# Patient Record
Sex: FEMALE | Race: BLACK OR AFRICAN AMERICAN | Employment: FULL TIME | URBAN - METROPOLITAN AREA
[De-identification: names, ages, dates, MRNs, and addresses within clinical notes are randomized per-mention and may not be internally consistent; named-entity substitution may affect disease eponyms.]

---

## 2017-01-31 ENCOUNTER — OFFICE VISIT (OUTPATIENT)
Dept: MIDWIFE SERVICES | Age: 27
End: 2017-01-31

## 2017-01-31 VITALS
BODY MASS INDEX: 37.2 KG/M2 | HEIGHT: 67 IN | DIASTOLIC BLOOD PRESSURE: 92 MMHG | HEART RATE: 97 BPM | SYSTOLIC BLOOD PRESSURE: 152 MMHG | WEIGHT: 237 LBS

## 2017-01-31 DIAGNOSIS — R87.612 LOW GRADE SQUAMOUS INTRAEPITH LESION ON CYTOLOGIC SMEAR CERVIX (LGSIL): Primary | ICD-10-CM

## 2017-01-31 RX ORDER — CODEINE PHOSPHATE AND GUAIFENESIN 10; 100 MG/5ML; MG/5ML
SOLUTION ORAL
COMMUNITY
Start: 2016-12-14 | End: 2018-01-05

## 2017-01-31 NOTE — PROGRESS NOTES
Chief Complaint   Patient presents with    Procedure     colposcopy     Visit Vitals    BP (!) 152/92    Pulse 97    Ht 5' 7\" (1.702 m)    Wt 237 lb (107.5 kg)    BMI 37.12 kg/m2       1. Have you been to the ER, urgent care clinic since your last visit? Hospitalized since your last visit? No    2. Have you seen or consulted any other health care providers outside of the [address] [place] [address] since your last visit? Include any pap smears or colon screening. No     Here today for a colposcopy . She verbalizes that she is very nervous. [address]  OFFICE PROCEDURE PROGRESS NOTE        Chart reviewed for the following:   Mari WARD RN, have reviewed the History, Physical and updated the Allergic reactions for Betgenevieve Jag.      TIME OUT performed immediately prior to start of procedure:   Mari WARD RN, have performed the following reviews on Betgenevieve Jag prior to the start of the procedure:            * Patient was identified by name and date of birth   * Agreement on procedure being performed was verified  * Risks and Benefits explained to the patient  * Procedure site verified and marked as necessary  * Patient was positioned for comfort  * Consent was signed and verified     Time: 1310      Date of procedure: 01/31/17    Procedure performed by: Carla Meléndez MD    Provider assisted by: Mari Nolasco RN      Patient assisted by: self    How tolerated by patient: tolerated the procedure well with no complications    Comments:

## 2017-02-01 NOTE — PROGRESS NOTES
Colposcopy Procedure Note    2017    Jef   31 y.o. AAF   LMP:  2017      Preop DX:       ICD-10-CM ICD-9-CM    1. Low grade squamous intraepith lesion on cytologic smear cervix (lgsil) R87.612 795.03         Post op Dx:  MUSTAPHA I-II    Procedure:   Colposcopy with biopsy of cervix at 12,2,3 and 6 o'clock and ECC    Indications:   Most recent Pap smear 6 weeks ago result: Mildly abnormal (LGSIL - encompassing HPV,mild dysplasia,MUSTAPHA I). .  The prior Pap result: not available  Prior cervical/vaginal disease: none. Prior cervical treatment: no treatment. Procedure Details   The risks and benefits of the procedure and written informed consent obtained. A timeout was taken to verify the patient and procedure to be performed. Speculum placed in vagina and excellent visualization of cervix achieved. Cervix examined under high power with complete visualization of the transformation zone. The cervix was cleaned with NS and examined under a green filter. Acetic acid was applied and acetowhite lesions were identified at 12, 2, 3 and 6 o'clock. An ECC was performed without with much bleeding       Findings:  Cervix:        Vaginal inspection: normal without visible lesions. Vulvar colposcopy: normal epithelium without lesions. Specimens: biopsies and ECC for permanent    Complications: none. Plan:  Tiffany Lam was seen today for procedure. Diagnoses and all orders for this visit:    Low grade squamous intraepith lesion on cytologic smear cervix (lgsil)      Follow-up Disposition: Not on File            Alejandro Troy MD, [address] [place] [address], Retired    April Cabot Professor, [address]

## 2017-02-04 LAB
DX ICD CODE: NORMAL
DX ICD CODE: NORMAL
PATH REPORT.FINAL DX SPEC: NORMAL
PATH REPORT.GROSS SPEC: NORMAL
PATH REPORT.SITE OF ORIGIN SPEC: NORMAL
PATHOLOGIST NAME: NORMAL
PAYMENT PROCEDURE: NORMAL

## 2017-02-07 ENCOUNTER — TELEPHONE (OUTPATIENT)
Dept: MIDWIFE SERVICES | Age: 27
End: 2017-02-07

## 2017-02-07 NOTE — TELEPHONE ENCOUNTER
Pt came into the office requesting colposcopy results and also stated there is an odor after having the colposcopy. Please call.

## 2017-02-08 NOTE — TELEPHONE ENCOUNTER
Spoke with Júnior Raza and discussed her results and the need to make an appt for 6 months for a pap. She states she already has an appt and had no further questions.

## 2017-07-13 ENCOUNTER — OFFICE VISIT (OUTPATIENT)
Dept: FAMILY MEDICINE CLINIC | Age: 27
End: 2017-07-13

## 2017-07-13 VITALS
DIASTOLIC BLOOD PRESSURE: 85 MMHG | HEIGHT: 67 IN | SYSTOLIC BLOOD PRESSURE: 132 MMHG | TEMPERATURE: 98.6 F | BODY MASS INDEX: 39.02 KG/M2 | HEART RATE: 90 BPM | WEIGHT: 248.6 LBS | RESPIRATION RATE: 16 BRPM | OXYGEN SATURATION: 97 %

## 2017-07-13 DIAGNOSIS — N34.2 INFECTIVE URETHRITIS: Primary | ICD-10-CM

## 2017-07-13 LAB
BILIRUB UR QL STRIP: NEGATIVE
GLUCOSE UR-MCNC: NEGATIVE MG/DL
KETONES P FAST UR STRIP-MCNC: NEGATIVE MG/DL
PH UR STRIP: 7 [PH] (ref 4.6–8)
PROT UR QL STRIP: NORMAL MG/DL
SP GR UR STRIP: 1.02 (ref 1–1.03)
UA UROBILINOGEN AMB POC: NORMAL (ref 0.2–1)
URINALYSIS CLARITY POC: CLEAR
URINALYSIS COLOR POC: YELLOW
URINE BLOOD POC: NORMAL
URINE LEUKOCYTES POC: NORMAL
URINE NITRITES POC: NEGATIVE

## 2017-07-13 RX ORDER — NITROFURANTOIN 25; 75 MG/1; MG/1
100 CAPSULE ORAL 2 TIMES DAILY
Qty: 10 CAP | Refills: 0 | Status: SHIPPED | OUTPATIENT
Start: 2017-07-13 | End: 2018-01-05 | Stop reason: ALTCHOICE

## 2017-07-13 NOTE — PROGRESS NOTES
Subjective  Memory Heading is an 28 y.o. female . Patient presents for dysuria. She also has urgency and frequency. Her symptoms started Monday. She called her PCP's office and was prescribed bactrim. She says she started to have improvement in her symptoms but then today noticed the dysuria returning and noticed a little more blood in her urine. She does not have fevers or chills. No nausea or vomiting. She does not have flank pain. Does not smoke. Last menstrual period ended July 1st.      History of UTI?: yes    Allergies - reviewed: Allergies   Allergen Reactions    Lisinopril Swelling     Lips swell         Medications - reviewed:   Current Outpatient Prescriptions   Medication Sig    hydrochlorothiazide (HYDRODIURIL) 25 mg tablet Take 1 Tab by mouth daily.  simvastatin (ZOCOR) 10 mg tablet Take 1 Tab by mouth nightly.  guaiFENesin-codeine (ROBITUSSIN AC) 100-10 mg/5 mL solution      No current facility-administered medications for this visit. Past Medical History - reviewed:  Past Medical History:   Diagnosis Date    Hypercholesterolemia     Hypertension 1/28/2011    IUD (intrauterine device) in place 7/23/2015    removed 2015    LSIL (low grade squamous intraepithelial lesion) on Pap smear 12/4/12    Menorrhagia with regular cycle 7/2/2013    Obesity, Class II, BMI 35-39.9 ([address] 9/23/13    BMI 37    Secondary dysmenorrhea 7/2/2013         Immunizations - reviewed:   Immunization History   Administered Date(s) Administered    Influenza Vaccine 09/05/2013    Influenza Vaccine Split 11/05/2010, 11/09/2011    TB Skin Test (PPD) Intradermal 06/02/2016    TDAP Vaccine 11/09/2011         ROS  Review of Systems : A complete review of systems as performed and is negative except for those mentioned in the HPI. Physical Exam  Visit Vitals    Ht 5' 7\" (1.702 m)    Wt 248 lb 9.6 oz (112.8 kg)    BMI 38.94 kg/m2       General appearance - Alert, NAD.    Abdomen - sot, non tender, no suprapubic tenderness no CVAT    Assessment/Plan  1. Infective urethritis: classic symptoms and UA showed 3+ LE and 2+ blood. Will switch to macrobid and send urine culture. Possible secondary infection vs resistant strain? . Odd that patient did initially have improvement in symptoms.   - macrobid 100mg BID for 5 days  - Drink lots of fluids    RTC if new or worsening symptoms    I discussed the aforementioned diagnoses with the patient as well as the plan of care.      Chi Cm MD  Family Medicine Resident  PGY 2

## 2017-07-13 NOTE — PATIENT INSTRUCTIONS
Urethritis: Care Instructions  Your Care Instructions    Urethritis is an infection of the tube that takes urine from the bladder to the outside of the body. This tube is called the urethra. The infection is often caused by bacteria. This can happen if you have a sexually transmitted infection (STI). But a virus may also be a cause. Urethritis is usually treated with antibiotics. Most cases clear up with treatment. Proper treatment is very important. If you don't treat it, the infection can lead to lasting damage of the urethra. Other parts of the urinary system can also be damaged. Follow-up care is a key part of your treatment and safety. Be sure to make and go to all appointments, and call your doctor if you are having problems. It's also a good idea to know your test results and keep a list of the medicines you take. How can you care for yourself at home? · If your doctor prescribed antibiotics, take them as directed. Do not stop taking them just because you feel better. You need to take the full course of antibiotics. · Take an over-the-counter pain medicine, such as acetaminophen (Tylenol), ibuprofen (Advil, Motrin), or naproxen (Aleve), if needed. Be safe with medicines. Read and follow all instructions on the label. · Do not take two or more pain medicines at the same time unless the doctor told you to. Many pain medicines have acetaminophen, which is Tylenol. Too much acetaminophen (Tylenol) can be harmful. · Your doctor may have you take phenazopyridine (Pyridium). This is a pain medicine for the urinary tract. It can turn your urine a deep red-orange. This is normal. Call your doctor if you think you are having a problem with your medicine. · Do not have sex until you are done with treatment. If you do have sex, be sure to use a condom. Your sex partner or partners should be tested too if your urethritis was caused by an STI.   · If your infection was caused by an injury or chemicals, avoid those things if you can. When should you call for help? Call your doctor now or seek immediate medical care if:  · You can't urinate. · You have symptoms of a urinary infection. For example:  ¨ You have blood or pus in your urine. ¨ You have pain in your back just below your rib cage. This is called flank pain. ¨ You have a fever, chills, or body aches. ¨ It hurts to urinate. ¨ You have groin or belly pain. · You have a hard time urinating when your bladder is full. · You notice mental changes or feel confused. Watch closely for changes in your health, and be sure to contact your doctor if:  · You do not get better as expected. Where can you learn more? Go to http://isabel-sayra.info/. Enter T204 in the search box to learn more about \"Urethritis: Care Instructions. \"  Current as of: August 12, 2016  Content Version: 11.3  © 0756-7993 [employer]. Care instructions adapted under license by [employer] (which disclaims liability or warranty for this information). If you have questions about a medical condition or this instruction, always ask your healthcare professional. [address] any warranty or liability for your use of this information.

## 2017-07-15 LAB — BACTERIA UR CULT: ABNORMAL

## 2017-07-17 ENCOUNTER — TELEPHONE (OUTPATIENT)
Dept: FAMILY MEDICINE CLINIC | Age: 27
End: 2017-07-17

## 2017-07-17 NOTE — PROGRESS NOTES
Called patient regarding urine results. (no answer)  Shows she is growing 100k of E coli susceptible to macrobid, which patient was started on at time of visit.   Continue current therapy

## 2018-01-05 ENCOUNTER — OFFICE VISIT (OUTPATIENT)
Dept: MIDWIFE SERVICES | Age: 28
End: 2018-01-05

## 2018-01-05 ENCOUNTER — HOSPITAL ENCOUNTER (OUTPATIENT)
Dept: LAB | Age: 28
Discharge: HOME | End: 2018-01-05
Payer: COMMERCIAL

## 2018-01-05 VITALS
HEART RATE: 81 BPM | WEIGHT: 242 LBS | DIASTOLIC BLOOD PRESSURE: 100 MMHG | HEIGHT: 67 IN | SYSTOLIC BLOOD PRESSURE: 146 MMHG | BODY MASS INDEX: 37.98 KG/M2

## 2018-01-05 DIAGNOSIS — Z11.3 SCREEN FOR STD (SEXUALLY TRANSMITTED DISEASE): ICD-10-CM

## 2018-01-05 DIAGNOSIS — I10 ESSENTIAL HYPERTENSION: ICD-10-CM

## 2018-01-05 DIAGNOSIS — Z01.419 ENCOUNTER FOR WELL WOMAN EXAM WITH ROUTINE GYNECOLOGICAL EXAM: Primary | ICD-10-CM

## 2018-01-05 DIAGNOSIS — R87.612 LOW GRADE SQUAMOUS INTRAEPITH LESION ON CYTOLOGIC SMEAR CERVIX (LGSIL): ICD-10-CM

## 2018-01-05 DIAGNOSIS — E78.3 HYPERCHYLOMICRONEMIA: ICD-10-CM

## 2018-01-05 PROCEDURE — 87624 HPV HI-RISK TYP POOLED RSLT: CPT | Performed by: OBSTETRICS & GYNECOLOGY

## 2018-01-05 PROCEDURE — 88175 CYTOPATH C/V AUTO FLUID REDO: CPT | Performed by: OBSTETRICS & GYNECOLOGY

## 2018-01-05 RX ORDER — HYDROCHLOROTHIAZIDE 25 MG/1
25 TABLET ORAL DAILY
Qty: 30 TAB | Refills: 2 | Status: SHIPPED | OUTPATIENT
Start: 2018-01-05 | End: 2019-07-16 | Stop reason: ALTCHOICE

## 2018-01-05 RX ORDER — IBUPROFEN 200 MG
CAPSULE ORAL
COMMUNITY
End: 2018-01-05 | Stop reason: ALTCHOICE

## 2018-01-05 RX ORDER — SIMVASTATIN 10 MG/1
10 TABLET, FILM COATED ORAL
Qty: 60 TAB | Refills: 0 | Status: SHIPPED | OUTPATIENT
Start: 2018-01-05 | End: 2021-05-24

## 2018-01-05 NOTE — PROGRESS NOTES
Well Woman Check Up       Subjective:     28 y.o.  AmericanF   LMP: 2017 for routine annual exam    Social History: single partner, contraception - condoms. Pertinent past medical history: . PAP History:   Done today due to history of LGSIL  Preventive Measures. Colonoscopy:  Age 48 or earlier based on signigicant FH  Mammogram:  Age 48 or earlier based on FH  DEXA: Age 72 or sooner based on risk factors  Lipids:  Age 36 or based on FH/risk factors      Patient Active Problem List    Diagnosis Date Noted    Obesity, Class II, BMI 35-39.9, with comorbidity 2013    Secondary dysmenorrhea 2013    Menorrhagia with regular cycle 2013    Low grade squamous intraepith lesion on cytologic smear cervix (lgsil) 2012    Hypertension 2011    Vitamin D deficiency 2010    Hyperlipidemia 2010     Current Outpatient Prescriptions   Medication Sig Dispense Refill    simvastatin (ZOCOR) 10 mg tablet Take 1 Tab by mouth nightly. 60 Tab 0    hydroCHLOROthiazide (HYDRODIURIL) 25 mg tablet Take 1 Tab by mouth daily.  30 Tab 2     Allergies   Allergen Reactions    Lisinopril Swelling     Lips swell     Past Medical History:   Diagnosis Date    Hypercholesterolemia     Hypertension 2011    IUD (intrauterine device) in place 2015    removed     LSIL (low grade squamous intraepithelial lesion) on Pap smear 12    Menorrhagia with regular cycle 2013    Obesity, Class II, BMI 35-39.9 13    BMI 37    Secondary dysmenorrhea 2013     Past Surgical History:   Procedure Laterality Date    HX COLPOSCOPY      HX DILATION AND CURETTAGE      pregnancy related    HX OTHER SURGICAL  2013    exploratory-uterine    HX WISDOM TEETH EXTRACTION       Family History   Problem Relation Age of Onset    Hypertension Mother     Hypertension Father     Cancer Father      prostate    Hypertension Sister     Hypertension Maternal Aunt     Diabetes Maternal Grandmother     Hypertension Maternal Grandmother     Diabetes Maternal Grandfather     Hypertension Maternal Grandfather     Diabetes Paternal Grandmother     Hypertension Paternal Grandmother     Diabetes Paternal Grandfather     Hypertension Paternal Grandfather      Social History   Substance Use Topics    Smoking status: Never Smoker    Smokeless tobacco: Never Used    Alcohol use 0.6 oz/week     1 Shots of liquor per week      Comment: on special occasions        ROS:  Feeling well. No dyspnea or chest pain on exertion. No abdominal pain, change in bowel habits, black or bloody stools. No urinary tract symptoms. GYN ROS: normal menses, no abnormal bleeding, pelvic pain or discharge, no breast pain or new or enlarging lumps on self exam, she desires screening for STD's. No neurological complaints. Objective:     Visit Vitals    BP (!) 146/100    Pulse 81    Ht 5' 7\" (1.702 m)    Wt 242 lb (109.8 kg)    LMP 12/14/2017 (Exact Date)    BMI 37.9 kg/m2     Gen: The patient appears well, alert, oriented x 3, in no distress. ENT:  normal.   Neck: supple. No adenopathy or thyromegaly. JORDANA. Lungs:  clear, good air entry, no wheezes, rhonchi or rales. CV: S1 and S2 normal, no murmurs, regular rate and rhythm. Abdomen: soft without tenderness, guarding, mass or organomegaly. Extremities:  no edema, normal peripheral pulses. Neurological:normal, no focal findings. BREAST EXAM: Declined  .     PELVIC EXAM: VULVA: normal appearing vulva with no masses, tenderness or lesions, VAGINA: normal appearing vagina with normal color and discharge, no lesions, CERVIX: normal appearing cervix without discharge or lesions, UTERUS: uterus is normal size, shape, consistency and nontender, ADNEXA: normal adnexa in size, nontender and no masses, PAP: Pap smear done today, thin-prep method, DNA probe for chlamydia and GC obtained exam chaperoned by:  Alton Dougherty LPN Assessment/Plan:     Diagnoses and all orders for this visit:    1. Encounter for well woman exam with routine gynecological exam  -     HEMOGLOBIN A1C WITH EAG  -     LIPID PANEL AND CHOL/HDL RATIO    2. Essential hypertension  -     LIPID PANEL AND CHOL/HDL RATIO  -     hydroCHLOROthiazide (HYDRODIURIL) 25 mg tablet; Take 1 Tab by mouth daily. 3. Low grade squamous intraepith lesion on cytologic smear cervix (lgsil)  -     PAP (IMAGE GUIDED) + HPV HIGH RISK    4. Screen for STD (sexually transmitted disease)  -     HEP B SURFACE AG  -     T PALLIDUM SCREEN W/REFLEX  -     HIV 1/2 AG/AB, 4TH GENERATION,W RFLX CONFIRM  -     CT+NG+TV+HSV BY LYNN    5. Hyperchylomicronemia  -     simvastatin (ZOCOR) 10 mg tablet; Take 1 Tab by mouth nightly. Follow-up Disposition:  Return in about 1 year (around 1/5/2019) for Annual exam pending lab results. Larance Schlatter Thayne Client, MD, [address] [place] [address], Retired    Brandon Forde, [address]

## 2018-01-05 NOTE — PATIENT INSTRUCTIONS
Thank you for choosing [facility]. We know you have many options when it comes to your healthcare; we appreciate that you picked us. Our goal is to provide exceptional service and world class care for every patient. You may receive a survey in the mail or by email asking for your feedback. Please take a few minutes to share your thoughts about your recent visit. Your comments helps us understand what we do well and what we can do better. To ensure confidentiality, this survey is administered by an independent third-party, [address] [place] [address] participation will help us to continue and improve the quality of care that we provide to you, your family, friends, and neighbors. Thank you! Abnormal Pap Test: Care Instructions  Your Care Instructions    A Pap test (also called a Pap smear) is used to look for early changes that may become cancer of the cervix. Your Pap test was abnormal. That may mean that some cells in your cervix have changed. The cell changes are most often caused by human papillomavirus (HPV) infection. An abnormal Pap test does not mean that the abnormal cells will lead to cancer. Changes in cervical cells may go away on their own or may progress slowly. Your doctor may want to repeat the Pap test or have you take other tests to see if you have cell changes. It is very important that you have regular Pap tests after you've had an abnormal Pap test.  Follow-up care is a key part of your treatment and safety. Be sure to make and go to all appointments, and call your doctor if you are having problems. It's also a good idea to know your test results and keep a list of the medicines you take. How can you care for yourself at home? · Do not smoke. Smoking may increase your risk for cervical cell changes. If you need help quitting, talk to your doctor about stop-smoking programs and medicines. These can increase your chances of quitting for good.   · Be sure to get follow-up Pap tests or other follow-up tests as recommended by your doctor. When should you call for help? Watch closely for changes in your health, and be sure to contact your doctor if you have any problems. Where can you learn more? Go to http://isabel-sayra.info/. Enter P925 in the search box to learn more about \"Abnormal Pap Test: Care Instructions. \"  Current as of: May 12, 2017  Content Version: 11.4  © 3540-4663 [employer]. Care instructions adapted under license by [employer] (which disclaims liability or warranty for this information). If you have questions about a medical condition or this instruction, always ask your healthcare professional. [address] any warranty or liability for your use of this information.

## 2018-01-05 NOTE — PROGRESS NOTES
Chief Complaint   Patient presents with    Well Woman     Visit Vitals    BP (!) 146/100    Pulse 81    Ht 5' 7\" (1.702 m)    Wt 242 lb (109.8 kg)    LMP 12/14/2017 (Exact Date)    BMI 37.9 kg/m2     1. Have you been to the ER, urgent care clinic since your last visit? Hospitalized since your last visit? No    2. Have you seen or consulted any other health care providers outside of the [address] [place] [address] since your last visit? Include any pap smears or colon screening.  No    Verbal order for NuSwab for STDs per Pt request, per Dr Kathryn Bynum

## 2018-01-10 LAB
C TRACH RRNA SPEC QL NAA+PROBE: NEGATIVE
HSV1 DNA SPEC QL NAA+PROBE: NEGATIVE
HSV2 DNA SPEC QL NAA+PROBE: NEGATIVE
N GONORRHOEA RRNA SPEC QL NAA+PROBE: NEGATIVE
T VAGINALIS RRNA SPEC QL NAA+PROBE: NEGATIVE

## 2018-01-16 ENCOUNTER — OFFICE VISIT (OUTPATIENT)
Dept: FAMILY MEDICINE CLINIC | Age: 28
End: 2018-01-16

## 2018-01-16 VITALS
BODY MASS INDEX: 38.77 KG/M2 | DIASTOLIC BLOOD PRESSURE: 87 MMHG | TEMPERATURE: 98.3 F | SYSTOLIC BLOOD PRESSURE: 142 MMHG | HEIGHT: 67 IN | RESPIRATION RATE: 18 BRPM | HEART RATE: 75 BPM | WEIGHT: 247 LBS | OXYGEN SATURATION: 97 %

## 2018-01-16 DIAGNOSIS — Z00.00 ROUTINE GENERAL MEDICAL EXAMINATION AT A HEALTH CARE FACILITY: Primary | ICD-10-CM

## 2018-01-16 DIAGNOSIS — E78.2 MIXED HYPERLIPIDEMIA: ICD-10-CM

## 2018-01-16 DIAGNOSIS — Z11.3 SCREENING FOR STD (SEXUALLY TRANSMITTED DISEASE): ICD-10-CM

## 2018-01-16 DIAGNOSIS — E55.9 VITAMIN D DEFICIENCY: ICD-10-CM

## 2018-01-16 DIAGNOSIS — I10 ESSENTIAL HYPERTENSION: ICD-10-CM

## 2018-01-16 RX ORDER — IBUPROFEN 800 MG/1
800 TABLET ORAL AS NEEDED
COMMUNITY
End: 2018-01-17 | Stop reason: SDUPTHER

## 2018-01-16 NOTE — PERIOP NOTES
[facility]  PREOPERATIVE INSTRUCTIONS    Surgery Date:   1/26/2018      Surgery arrival time given by surgeon: NO  (If [facility][1] staff will call you between 3pm - 7pm the day before surgery with your arrival time. If your surgery is on a Monday, we will call you the preceding Friday. Please call [phone] after 7pm if you did not receive your arrival time.)  1. Report  to the 2nd Floor Admitting Desk on the day of your surgery. Bring your insurance card, photo identification, and any copayment (if applicable). 2. You must have a responsible adult to drive you home and stay with you the first 24 hours after surgery if you are going home the same day of your surgery. 3. Nothing to eat or drink after midnight the night before surgery. This means NO water, gum, mints, coffee, juice, etc.    4. MEDICATIONS TO TAKE THE MORNING OF SURGERY WITH A SIP OF WATER:none but HOLD your HCTZ day of surgery only. Do not take the Ibuprofen within a week of surgery. 5. No alcoholic beverages 24 hours before and after your surgery. 6. If you are being admitted to the hospital,please leave personal belongings/luggage in your car until you have an assigned hospital room number. ( The hospital discharge time is 12 PM NOON. Your adult  should be at the hospital prior to the noon discharge time unless otherwise instructed.)   7. STOP Aspirin and/or any non-steroidal anti-inflammatory drugs (i.e. Ibuprofen, Naproxen, Advil, Aleve) as directed by your surgeon. You may take Tylenol. Stop herbal supplements 1 week prior to  surgery. 8. If you are currently taking Plavix, Coumadin,or any other blood-thinning/ anticoagulant medication contact your surgeon for instructions. 9. Wear comfortable clothes. Wear your glasses instead of contacts. Please leave all money, jewelry and valuables at home. No make up, particularly mascara, the day of surgery. 10.  REMOVE ALL body piercings, rings,and jewelry and leave at home.   Wear your hair loose or down, no pony-tails, buns, or any metal hair clips. 11. If you shower the morning of surgery, please do not apply any lotions, powders, or deodorants afterwards. Do not shave any body area within 24 hours of your surgery. 12. Please follow all instructions to avoid any potential surgical cancellation. 13. Should your physical condition change, (i.e. fever, cold, flu, etc.) please notify your surgeon as soon as possible. 14. It is important to be on time. If a situation occurs where you may be delayed, please call:  [phone] / [phone] on the day of surgery. 15. The Preadmission Testing staff can be reached at [phone]. 16. Special instructions:   Free  parking 7-5  17. The patient was contacted  via phone. She  verbalize  understanding of all instructions does not  need reinforcement.

## 2018-01-16 NOTE — PROGRESS NOTES
SURGICAL PROCEDURE ORDERS    Sunni Vlad  :  1990  Age: 28 y.o. Surgical Procedure Description:   Exam under anesthesia, LEEP 72540           ICD-10-CM ICD-9-CM    1. Encounter for well woman exam with routine gynecological exam Z01.419 V72.31 HEMOGLOBIN A1C WITH EAG      LIPID PANEL AND CHOL/HDL RATIO   2. Essential hypertension I10 401.9 LIPID PANEL AND CHOL/HDL RATIO      hydroCHLOROthiazide (HYDRODIURIL) 25 mg tablet   3. Low grade squamous intraepith lesion on cytologic smear cervix (lgsil) R87.612 795.03 PAP (IMAGE GUIDED) + HPV HIGH RISK   4. Screen for STD (sexually transmitted disease) Z11.3 V74.5 HEP B SURFACE AG      T PALLIDUM SCREEN W/REFLEX      HIV 1/2 AG/AB, 4TH GENERATION,W RFLX CONFIRM      CT+NG+TV+HSV BY LYNN   5.  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  no  Pre-Admission Testing? no   PAT Dx Codes  Z01.810    Cardiology   no  Z01.811 Respiratory no  Z01.812 Laboratory no  Z01.818 Other  no  Date and Time of PAT:___________________________________________________    SURGERY POSTED BY: _________________________________________________  Patient's Insurance Company:  ______________________________________________   Telephone #: ______________________________________________________   Member #      ______________________________________________________   Group #         ______________________________________________________   Authorization # ____________________________________________________  Authorization per:  _______________________  Date & Time ________________  Patient notified:  Date ______________  Time:  _______________________  Packet given      Date ______________  On provider calendar:  _____________   Pre-certification notified  __________________  Authorization info entered into [employer]  ____________  By:  _______________________

## 2018-01-16 NOTE — MR AVS SNAPSHOT
[address] [address] 
[phone] Patient: Rashad Valdovinos MRN: [id number] [id number] Visit Information Date & Time Provider Department Dept. Phone Encounter #  
 1/16/2018  7:00 AM Alivia Watson MD [address] [facility] [phone] [id number] Upcoming Health Maintenance Date Due  
 PAP AKA CERVICAL CYTOLOGY 1/5/2021 DTaP/Tdap/Td series (2 - Td) 11/9/2021 Allergies as of 1/16/2018  Review Complete On: 1/16/2018 By: Alivia Watson MD  
  
 Severity Noted Reaction Type Reactions Lisinopril  10/22/2015    Swelling Lips swell Current Immunizations  Reviewed on 1/16/2018 Name Date Influenza Vaccine 1/1/2018, 9/5/2013 Influenza Vaccine Split 11/9/2011, 11/5/2010 TB Skin Test (PPD) Intradermal 6/2/2016 TDAP Vaccine 11/9/2011 Reviewed by Alivia Watson MD on 1/16/2018 at  7:33 AM  
You Were Diagnosed With   
  
 Codes Comments Routine general medical examination at a health care facility    -  Primary ICD-10-CM: Z00.00 ICD-9-CM: V70.0 Mixed hyperlipidemia     ICD-10-CM: E78.2 ICD-9-CM: 272.2 Vitamin D deficiency     ICD-10-CM: E55.9 ICD-9-CM: 268.9 Essential hypertension     ICD-10-CM: I10 
ICD-9-CM: 401.9 Screening for STD (sexually transmitted disease)     ICD-10-CM: Z11.3 ICD-9-CM: V74.5 Vitals BP Pulse Temp Resp Height(growth percentile) Weight(growth percentile) 142/87 (BP 1 Location: Right arm, BP Patient Position: Sitting) 75 98.3 °F (36.8 °C) (Oral) 18 5' 7\" (1.702 m) 247 lb (112 kg) LMP SpO2 BMI OB Status Smoking Status 01/15/2018 (Exact Date) 97% 38.69 kg/m2 Having regular periods Never Smoker Vitals History BMI and BSA Data Body Mass Index Body Surface Area  
 38.69 kg/m 2 2.3 m 2 Preferred Pharmacy Pharmacy Name Phone [address] [phone] Your Updated Medication List  
  
   
This list is accurate as of: 1/16/18  7:36 AM.  Always use your most recent med list.  
  
  
  
  
 hydroCHLOROthiazide 25 mg tablet Commonly known as:  HYDRODIURIL Take 1 Tab by mouth daily. simvastatin 10 mg tablet Commonly known as:  ZOCOR Take 1 Tab by mouth nightly. We Performed the Following CBC WITH AUTOMATED DIFF [11442 CPT(R)] HEMOGLOBIN A1C WITH EAG [91940 CPT(R)] HEP B SURFACE AG [id number] CPT(R)] HIV 1/2 AG/AB, 4TH GENERATION,W RFLX CONFIRM [id number] CPT(R)] LIPID PANEL [80639 CPT(R)] METABOLIC PANEL, COMPREHENSIVE [90142 CPT(R)] T PALLIDUM SCREEN W/REFLEX [[id number] Custom] TSH 3RD GENERATION [48342 CPT(R)] VITAMIN D, 25 HYDROXY [id number] CPT(R)] Introducing [facility] & [facility] SERVICES! Dear Herbert: 
Thank you for requesting a markedup account. Our records indicate that you already have an active markedup account. You can access your account anytime at [email or web address] Did you know that you can access your hospital and ER discharge instructions at any time in markedup? You can also review all of your test results from your hospital stay or ER visit. Additional Information If you have questions, please visit the Frequently Asked Questions section of the markedup website at [email or web address] Remember, markedup is NOT to be used for urgent needs. For medical emergencies, dial 911. Now available from your iPhone and Android! Please provide this summary of care documentation to your next provider. Your primary care clinician is listed as Jesenia Li. If you have any questions after today's visit, please call [phone].

## 2018-01-16 NOTE — PROGRESS NOTES
Pt here for routine physical.  Pt is fasting this AM for routine lab work. Requesting to have biometric screening form filled out for employer. C/o 10/10 lower abdominal/pelvic pain r/t menstrual cycle.

## 2018-01-16 NOTE — PROGRESS NOTES
Pt here for routine physical.  Pt is fasting this AM for routine lab work. Requesting to have biometric screening form filled out for employer. C/o 10/10 lower abdominal/pelvic pain r/t menstrual cycle. Subjective: (As above and below)     Chief Complaint   Patient presents with    Physical     she is a 28y.o. year old female who presents for evaluation. Reviewed PmHx, RxHx, FmHx, SocHx, AllgHx and updated in chart. Review of Systems - negative except as listed above    Objective:     Vitals:    01/16/18 0714   BP: 142/87   Pulse: 75   Resp: 18   Temp: 98.3 °F (36.8 °C)   TempSrc: Oral   SpO2: 97%   Weight: 247 lb (112 kg)   Height: 5' 7\" (1.702 m)     Physical Examination: General appearance - alert, well appearing, and in no distress  Mental status - normal mood, behavior, speech, dress, motor activity, and thought processes  Mouth - mucous membranes moist, pharynx normal without lesions  Chest - clear to auscultation, no wheezes, rales or rhonchi, symmetric air entry  Heart - normal rate, regular rhythm, normal S1, S2, no murmurs, rubs, clicks or gallops  Musculoskeletal - no joint tenderness, deformity or swelling  Extremities - peripheral pulses normal, no pedal edema, no clubbing or cyanosis    Assessment/ Plan:   1. Mixed hyperlipidemia  -check fasting labs  - LIPID PANEL  - METABOLIC PANEL, COMPREHENSIVE    2. Vitamin D deficiency  - VITAMIN D, 25 HYDROXY    3. Essential hypertension  -elevated today, likely related to pain, pt will monitor at home  - CBC WITH AUTOMATED DIFF  - TSH 3RD GENERATION  - HEMOGLOBIN A1C WITH EAG     Follow-up Disposition: As needed  I have discussed the diagnosis with the patient and the intended plan as seen in the above orders. The patient has received an after-visit summary and questions were answered concerning future plans.      Medication Side Effects and Warnings were discussed with patient: yes  Patient Labs were reviewed: yes  Patient Past Records were reviewed:  yes    Popeye Rosado M.D.

## 2018-01-17 RX ORDER — IBUPROFEN 800 MG/1
800 TABLET ORAL AS NEEDED
Qty: 90 TAB | Refills: 3 | Status: SHIPPED | OUTPATIENT
Start: 2018-01-17 | End: 2021-07-27

## 2018-01-18 ENCOUNTER — DOCUMENTATION ONLY (OUTPATIENT)
Dept: FAMILY MEDICINE CLINIC | Age: 28
End: 2018-01-18

## 2018-01-18 LAB
25(OH)D3+25(OH)D2 SERPL-MCNC: 10.5 NG/ML (ref 30–100)
ALBUMIN SERPL-MCNC: 4.2 G/DL (ref 3.5–5.5)
ALBUMIN/GLOB SERPL: 1.3 {RATIO} (ref 1.2–2.2)
ALP SERPL-CCNC: 70 IU/L (ref 39–117)
ALT SERPL-CCNC: 9 IU/L (ref 0–32)
AST SERPL-CCNC: 12 IU/L (ref 0–40)
BASOPHILS # BLD AUTO: 0 X10E3/UL (ref 0–0.2)
BASOPHILS NFR BLD AUTO: 1 %
BILIRUB SERPL-MCNC: 0.3 MG/DL (ref 0–1.2)
BUN SERPL-MCNC: 11 MG/DL (ref 6–20)
BUN/CREAT SERPL: 11 (ref 9–23)
CALCIUM SERPL-MCNC: 9.2 MG/DL (ref 8.7–10.2)
CHLORIDE SERPL-SCNC: 100 MMOL/L (ref 96–106)
CHOLEST SERPL-MCNC: 199 MG/DL (ref 100–199)
CO2 SERPL-SCNC: 24 MMOL/L (ref 18–29)
CREAT SERPL-MCNC: 1 MG/DL (ref 0.57–1)
EOSINOPHIL # BLD AUTO: 0.1 X10E3/UL (ref 0–0.4)
EOSINOPHIL NFR BLD AUTO: 2 %
ERYTHROCYTE [DISTWIDTH] IN BLOOD BY AUTOMATED COUNT: 17.7 % (ref 12.3–15.4)
EST. AVERAGE GLUCOSE BLD GHB EST-MCNC: 123 MG/DL
GLOBULIN SER CALC-MCNC: 3.3 G/DL (ref 1.5–4.5)
GLUCOSE SERPL-MCNC: 89 MG/DL (ref 65–99)
HBA1C MFR BLD: 5.9 % (ref 4.8–5.6)
HBV SURFACE AG SERPL QL IA: NEGATIVE
HCT VFR BLD AUTO: 34.1 % (ref 34–46.6)
HDLC SERPL-MCNC: 50 MG/DL
HGB BLD-MCNC: 10.4 G/DL (ref 11.1–15.9)
HIV 1+2 AB+HIV1 P24 AG SERPL QL IA: NON REACTIVE
IMM GRANULOCYTES # BLD: 0 X10E3/UL (ref 0–0.1)
IMM GRANULOCYTES NFR BLD: 0 %
INTERPRETATION, 910389: NORMAL
LDLC SERPL CALC-MCNC: 124 MG/DL (ref 0–99)
LYMPHOCYTES # BLD AUTO: 2.3 X10E3/UL (ref 0.7–3.1)
LYMPHOCYTES NFR BLD AUTO: 36 %
MCH RBC QN AUTO: 23.4 PG (ref 26.6–33)
MCHC RBC AUTO-ENTMCNC: 30.5 G/DL (ref 31.5–35.7)
MCV RBC AUTO: 77 FL (ref 79–97)
MONOCYTES # BLD AUTO: 0.5 X10E3/UL (ref 0.1–0.9)
MONOCYTES NFR BLD AUTO: 9 %
NEUTROPHILS # BLD AUTO: 3.3 X10E3/UL (ref 1.4–7)
NEUTROPHILS NFR BLD AUTO: 52 %
PLATELET # BLD AUTO: 367 X10E3/UL (ref 150–379)
POTASSIUM SERPL-SCNC: 4 MMOL/L (ref 3.5–5.2)
PROT SERPL-MCNC: 7.5 G/DL (ref 6–8.5)
RBC # BLD AUTO: 4.45 X10E6/UL (ref 3.77–5.28)
SODIUM SERPL-SCNC: 139 MMOL/L (ref 134–144)
T PALLIDUM AB SER QL IA: NEGATIVE
TRIGL SERPL-MCNC: 126 MG/DL (ref 0–149)
TSH SERPL DL<=0.005 MIU/L-ACNC: 2.85 UIU/ML (ref 0.45–4.5)
VLDLC SERPL CALC-MCNC: 25 MG/DL (ref 5–40)
WBC # BLD AUTO: 6.3 X10E3/UL (ref 3.4–10.8)

## 2018-01-18 NOTE — PROGRESS NOTES
Mild anemia, please take multivitamin with iron daily. Cholesterol improved but remains above goal, continue to work on diet and exercise. Vitamin D is slightly low, please take a daily supplement of at least 2000 IU (international units) daily. Increase in risk for diabetes, please closely monitor diet and increase exercise   All other labs are within normal limits. A message has been sent in [employer] and the lab work released to the patient.

## 2018-01-25 ENCOUNTER — ANESTHESIA EVENT (OUTPATIENT)
Dept: SURGERY | Age: 28
End: 2018-01-25
Payer: COMMERCIAL

## 2018-01-25 NOTE — PERIOP NOTES
Spoke with Layne Aiken at Dr. Marlen Thompson office requesting orders for surgery.   DOS: 1/26/2018

## 2018-01-26 ENCOUNTER — ANESTHESIA (OUTPATIENT)
Dept: SURGERY | Age: 28
End: 2018-01-26
Payer: COMMERCIAL

## 2018-01-26 ENCOUNTER — HOSPITAL ENCOUNTER (OUTPATIENT)
Age: 28
Setting detail: OUTPATIENT SURGERY
Discharge: HOME | End: 2018-01-26
Attending: OBSTETRICS & GYNECOLOGY | Admitting: OBSTETRICS & GYNECOLOGY
Payer: COMMERCIAL

## 2018-01-26 VITALS
HEART RATE: 96 BPM | WEIGHT: 246.91 LBS | TEMPERATURE: 98.2 F | BODY MASS INDEX: 38.75 KG/M2 | OXYGEN SATURATION: 95 % | SYSTOLIC BLOOD PRESSURE: 131 MMHG | HEIGHT: 67 IN | RESPIRATION RATE: 21 BRPM | DIASTOLIC BLOOD PRESSURE: 70 MMHG

## 2018-01-26 DIAGNOSIS — R87.612 LOW GRADE SQUAMOUS INTRAEPITH LESION ON CYTOLOGIC SMEAR CERVIX (LGSIL): ICD-10-CM

## 2018-01-26 LAB
BASOPHILS # BLD: 0 K/UL (ref 0–0.1)
BASOPHILS NFR BLD: 1 % (ref 0–1)
DIFFERENTIAL METHOD BLD: ABNORMAL
EOSINOPHIL # BLD: 0.1 K/UL (ref 0–0.4)
EOSINOPHIL NFR BLD: 1 % (ref 0–7)
ERYTHROCYTE [DISTWIDTH] IN BLOOD BY AUTOMATED COUNT: 16.2 % (ref 11.5–14.5)
HCG UR QL: NEGATIVE
HCT VFR BLD AUTO: 33.5 % (ref 35–47)
HGB BLD-MCNC: 10.2 G/DL (ref 11.5–16)
IMM GRANULOCYTES # BLD: 0 K/UL (ref 0–0.04)
IMM GRANULOCYTES NFR BLD AUTO: 0 % (ref 0–0.5)
LYMPHOCYTES # BLD: 2.3 K/UL (ref 0.8–3.5)
LYMPHOCYTES NFR BLD: 30 % (ref 12–49)
MCH RBC QN AUTO: 23.2 PG (ref 26–34)
MCHC RBC AUTO-ENTMCNC: 30.4 G/DL (ref 30–36.5)
MCV RBC AUTO: 76.1 FL (ref 80–99)
MONOCYTES # BLD: 0.7 K/UL (ref 0–1)
MONOCYTES NFR BLD: 9 % (ref 5–13)
NEUTS SEG # BLD: 4.5 K/UL (ref 1.8–8)
NEUTS SEG NFR BLD: 59 % (ref 32–75)
NRBC # BLD: 0 K/UL (ref 0–0.01)
NRBC BLD-RTO: 0 PER 100 WBC
PLATELET # BLD AUTO: 382 K/UL (ref 150–400)
PMV BLD AUTO: 10.6 FL (ref 8.9–12.9)
RBC # BLD AUTO: 4.4 M/UL (ref 3.8–5.2)
WBC # BLD AUTO: 7.7 K/UL (ref 3.6–11)

## 2018-01-26 PROCEDURE — 77030018722 HC ELCTRD BALL LEEP UTMD -B: Performed by: OBSTETRICS & GYNECOLOGY

## 2018-01-26 PROCEDURE — 81025 URINE PREGNANCY TEST: CPT

## 2018-01-26 PROCEDURE — 74011000250 HC RX REV CODE- 250

## 2018-01-26 PROCEDURE — 74011250636 HC RX REV CODE- 250/636: Performed by: ANESTHESIOLOGY

## 2018-01-26 PROCEDURE — 76060000032 HC ANESTHESIA 0.5 TO 1 HR: Performed by: OBSTETRICS & GYNECOLOGY

## 2018-01-26 PROCEDURE — 77030005537 HC CATH URETH BARD -A: Performed by: OBSTETRICS & GYNECOLOGY

## 2018-01-26 PROCEDURE — 76210000016 HC OR PH I REC 1 TO 1.5 HR: Performed by: OBSTETRICS & GYNECOLOGY

## 2018-01-26 PROCEDURE — 76210000020 HC REC RM PH II FIRST 0.5 HR: Performed by: OBSTETRICS & GYNECOLOGY

## 2018-01-26 PROCEDURE — 77030020143 HC AIRWY LARYN INTUB CGAS -A: Performed by: NURSE ANESTHETIST, CERTIFIED REGISTERED

## 2018-01-26 PROCEDURE — 77030011640 HC PAD GRND REM COVD -A: Performed by: OBSTETRICS & GYNECOLOGY

## 2018-01-26 PROCEDURE — 76010000138 HC OR TIME 0.5 TO 1 HR: Performed by: OBSTETRICS & GYNECOLOGY

## 2018-01-26 PROCEDURE — 74011000272 HC RX REV CODE- 272: Performed by: OBSTETRICS & GYNECOLOGY

## 2018-01-26 PROCEDURE — 74011000250 HC RX REV CODE- 250: Performed by: OBSTETRICS & GYNECOLOGY

## 2018-01-26 PROCEDURE — 77030007304 HC ELECTRD LP RND MEGA -A: Performed by: OBSTETRICS & GYNECOLOGY

## 2018-01-26 PROCEDURE — 74011250636 HC RX REV CODE- 250/636: Performed by: OBSTETRICS & GYNECOLOGY

## 2018-01-26 PROCEDURE — 85025 COMPLETE CBC W/AUTO DIFF WBC: CPT | Performed by: OBSTETRICS & GYNECOLOGY

## 2018-01-26 PROCEDURE — 77030003666 HC NDL SPINAL BD -A: Performed by: OBSTETRICS & GYNECOLOGY

## 2018-01-26 PROCEDURE — 77030020782 HC GWN BAIR PAWS FLX 3M -B

## 2018-01-26 PROCEDURE — 74011250636 HC RX REV CODE- 250/636

## 2018-01-26 PROCEDURE — 36415 COLL VENOUS BLD VENIPUNCTURE: CPT | Performed by: OBSTETRICS & GYNECOLOGY

## 2018-01-26 PROCEDURE — 88307 TISSUE EXAM BY PATHOLOGIST: CPT | Performed by: OBSTETRICS & GYNECOLOGY

## 2018-01-26 PROCEDURE — 77030032490 HC SLV COMPR SCD KNE COVD -B

## 2018-01-26 RX ORDER — KETOROLAC TROMETHAMINE 30 MG/ML
30 INJECTION, SOLUTION INTRAMUSCULAR; INTRAVENOUS
Status: COMPLETED | OUTPATIENT
Start: 2018-01-26 | End: 2018-01-26

## 2018-01-26 RX ORDER — PROPOFOL 10 MG/ML
INJECTION, EMULSION INTRAVENOUS AS NEEDED
Status: DISCONTINUED | OUTPATIENT
Start: 2018-01-26 | End: 2018-01-26 | Stop reason: HOSPADM

## 2018-01-26 RX ORDER — ACETIC ACID 5 %
LIQUID (ML) MISCELLANEOUS AS NEEDED
Status: DISCONTINUED | OUTPATIENT
Start: 2018-01-26 | End: 2018-01-26 | Stop reason: HOSPADM

## 2018-01-26 RX ORDER — ESMOLOL HYDROCHLORIDE 10 MG/ML
INJECTION INTRAVENOUS AS NEEDED
Status: DISCONTINUED | OUTPATIENT
Start: 2018-01-26 | End: 2018-01-26 | Stop reason: HOSPADM

## 2018-01-26 RX ORDER — FENTANYL CITRATE 50 UG/ML
INJECTION, SOLUTION INTRAMUSCULAR; INTRAVENOUS AS NEEDED
Status: DISCONTINUED | OUTPATIENT
Start: 2018-01-26 | End: 2018-01-26 | Stop reason: HOSPADM

## 2018-01-26 RX ORDER — DIPHENHYDRAMINE HYDROCHLORIDE 50 MG/ML
12.5 INJECTION, SOLUTION INTRAMUSCULAR; INTRAVENOUS AS NEEDED
Status: DISCONTINUED | OUTPATIENT
Start: 2018-01-26 | End: 2018-01-26 | Stop reason: HOSPADM

## 2018-01-26 RX ORDER — SODIUM CHLORIDE, SODIUM LACTATE, POTASSIUM CHLORIDE, CALCIUM CHLORIDE 600; 310; 30; 20 MG/100ML; MG/100ML; MG/100ML; MG/100ML
125 INJECTION, SOLUTION INTRAVENOUS CONTINUOUS
Status: DISCONTINUED | OUTPATIENT
Start: 2018-01-26 | End: 2018-01-26 | Stop reason: HOSPADM

## 2018-01-26 RX ORDER — FLUMAZENIL 0.1 MG/ML
0.2 INJECTION INTRAVENOUS
Status: DISCONTINUED | OUTPATIENT
Start: 2018-01-26 | End: 2018-01-26 | Stop reason: HOSPADM

## 2018-01-26 RX ORDER — FERRIC SUBSULFATE 20-22G/100
SOLUTION, NON-ORAL MISCELLANEOUS AS NEEDED
Status: DISCONTINUED | OUTPATIENT
Start: 2018-01-26 | End: 2018-01-26 | Stop reason: HOSPADM

## 2018-01-26 RX ORDER — MIDAZOLAM HYDROCHLORIDE 1 MG/ML
INJECTION, SOLUTION INTRAMUSCULAR; INTRAVENOUS AS NEEDED
Status: DISCONTINUED | OUTPATIENT
Start: 2018-01-26 | End: 2018-01-26 | Stop reason: HOSPADM

## 2018-01-26 RX ORDER — LIDOCAINE HYDROCHLORIDE AND EPINEPHRINE 10; 10 MG/ML; UG/ML
INJECTION, SOLUTION INFILTRATION; PERINEURAL AS NEEDED
Status: DISCONTINUED | OUTPATIENT
Start: 2018-01-26 | End: 2018-01-26 | Stop reason: HOSPADM

## 2018-01-26 RX ORDER — HYDROMORPHONE HYDROCHLORIDE 2 MG/ML
.25-1 INJECTION, SOLUTION INTRAMUSCULAR; INTRAVENOUS; SUBCUTANEOUS
Status: DISCONTINUED | OUTPATIENT
Start: 2018-01-26 | End: 2018-01-26 | Stop reason: HOSPADM

## 2018-01-26 RX ORDER — NALOXONE HYDROCHLORIDE 0.4 MG/ML
0.2 INJECTION, SOLUTION INTRAMUSCULAR; INTRAVENOUS; SUBCUTANEOUS
Status: DISCONTINUED | OUTPATIENT
Start: 2018-01-26 | End: 2018-01-26 | Stop reason: HOSPADM

## 2018-01-26 RX ORDER — LIDOCAINE HYDROCHLORIDE 10 MG/ML
0.1 INJECTION, SOLUTION EPIDURAL; INFILTRATION; INTRACAUDAL; PERINEURAL AS NEEDED
Status: DISCONTINUED | OUTPATIENT
Start: 2018-01-26 | End: 2018-01-26 | Stop reason: HOSPADM

## 2018-01-26 RX ORDER — LIDOCAINE HYDROCHLORIDE 20 MG/ML
INJECTION, SOLUTION EPIDURAL; INFILTRATION; INTRACAUDAL; PERINEURAL AS NEEDED
Status: DISCONTINUED | OUTPATIENT
Start: 2018-01-26 | End: 2018-01-26 | Stop reason: HOSPADM

## 2018-01-26 RX ORDER — SODIUM CHLORIDE 0.9 % (FLUSH) 0.9 %
5-10 SYRINGE (ML) INJECTION EVERY 8 HOURS
Status: DISCONTINUED | OUTPATIENT
Start: 2018-01-26 | End: 2018-01-26 | Stop reason: HOSPADM

## 2018-01-26 RX ORDER — SODIUM CHLORIDE 0.9 % (FLUSH) 0.9 %
5-10 SYRINGE (ML) INJECTION AS NEEDED
Status: DISCONTINUED | OUTPATIENT
Start: 2018-01-26 | End: 2018-01-26 | Stop reason: HOSPADM

## 2018-01-26 RX ORDER — MIDAZOLAM HYDROCHLORIDE 1 MG/ML
2 INJECTION, SOLUTION INTRAMUSCULAR; INTRAVENOUS
Status: DISCONTINUED | OUTPATIENT
Start: 2018-01-26 | End: 2018-01-26 | Stop reason: HOSPADM

## 2018-01-26 RX ADMIN — PROPOFOL 200 MG: 10 INJECTION, EMULSION INTRAVENOUS at 12:07

## 2018-01-26 RX ADMIN — FENTANYL CITRATE 50 MCG: 50 INJECTION, SOLUTION INTRAMUSCULAR; INTRAVENOUS at 12:02

## 2018-01-26 RX ADMIN — KETOROLAC TROMETHAMINE 30 MG: 30 INJECTION, SOLUTION INTRAMUSCULAR at 13:12

## 2018-01-26 RX ADMIN — LIDOCAINE HYDROCHLORIDE 40 MG: 20 INJECTION, SOLUTION EPIDURAL; INFILTRATION; INTRACAUDAL; PERINEURAL at 12:07

## 2018-01-26 RX ADMIN — HYDROMORPHONE HYDROCHLORIDE 0.5 MG: 2 INJECTION INTRAMUSCULAR; INTRAVENOUS; SUBCUTANEOUS at 13:28

## 2018-01-26 RX ADMIN — ESMOLOL HYDROCHLORIDE 10 MG: 10 INJECTION INTRAVENOUS at 12:26

## 2018-01-26 RX ADMIN — FENTANYL CITRATE 50 MCG: 50 INJECTION, SOLUTION INTRAMUSCULAR; INTRAVENOUS at 12:06

## 2018-01-26 RX ADMIN — HYDROMORPHONE HYDROCHLORIDE 0.5 MG: 2 INJECTION INTRAMUSCULAR; INTRAVENOUS; SUBCUTANEOUS at 13:58

## 2018-01-26 RX ADMIN — MIDAZOLAM HYDROCHLORIDE 2 MG: 1 INJECTION, SOLUTION INTRAMUSCULAR; INTRAVENOUS at 12:02

## 2018-01-26 RX ADMIN — SODIUM CHLORIDE, POTASSIUM CHLORIDE, SODIUM LACTATE AND CALCIUM CHLORIDE: 600; 310; 30; 20 INJECTION, SOLUTION INTRAVENOUS at 12:30

## 2018-01-26 RX ADMIN — SODIUM CHLORIDE, POTASSIUM CHLORIDE, SODIUM LACTATE AND CALCIUM CHLORIDE: 600; 310; 30; 20 INJECTION, SOLUTION INTRAVENOUS at 12:07

## 2018-01-26 NOTE — ANESTHESIA PREPROCEDURE EVALUATION
Anesthetic History          Comments: Slow to wake     Review of Systems / Medical History  Patient summary reviewed, nursing notes reviewed and pertinent labs reviewed    Pulmonary  Within defined limits                 Neuro/Psych   Within defined limits           Cardiovascular    Hypertension              Exercise tolerance: >4 METS     GI/Hepatic/Renal  Within defined limits              Endo/Other        Morbid obesity     Other Findings              Physical Exam    Airway  Mallampati: II  TM Distance: 4 - 6 cm  Neck ROM: normal range of motion   Mouth opening: Normal     Cardiovascular  Regular rate and rhythm,  S1 and S2 normal,  no murmur, click, rub, or gallop             Dental    Dentition: Lower dentition intact and Upper dentition intact     Pulmonary  Breath sounds clear to auscultation               Abdominal  GI exam deferred       Other Findings            Anesthetic Plan    ASA: 2  Anesthesia type: general          Induction: Intravenous  Anesthetic plan and risks discussed with: Patient

## 2018-01-26 NOTE — OP NOTES
Operative Note    Preoperative Diagnosis: LOW GRADE SQUAMOUS INTRAEPITHELIAL LESION ON CYTOLOGIC SMEAR OF CERVIX    Postoperative Diagnosis:  High GRADE SQUAMOUS INTRAEPITHELIAL LESION ON CYTOLOGIC SMEAR OF CERVIX    Surgeon: Serena Majano MD    Assistants: Surgeon(s):  Serena Majano MD    Anesthesia:  General     Indications: The patient was admitted to the hospital with abnormal cervical pathology. The patient now presents for LEEP after discussing therapeutic alternatives. Procedure in Detail:       After informed consent patient was taken to Main OR #7 at [facility], where she was transferred to the OR table in the supine position. After induction of General anesthesia the patient was moved to a  dorsal lithotomy position using candy cane stirups. She was prepped and draped for a vaginal approach. The cervix was then cleaned with Lugol's solution and a LEEP was performed with ectocervical and  specimens obtained. }    The patient was awakening from general anesthesia and taken to the recovery room in stable condition. Findings:  acetowhite lesions on the anterior portion of cervix    Estimated Blood Loss: 15 mL           Drains: None                Specimens:   ID Type Source Tests Collected by Time Destination   1 : cervical leep Preservative Cervical  Serena Majano MD 1/26/2018 1231 Pathology               Implants: * No implants in log *           Complications:  None           Disposition: PACU - hemodynamically stable.            Condition: stable    Signed By: Serena Majano MD                         January 26, 2018

## 2018-01-26 NOTE — ANESTHESIA POSTPROCEDURE EVALUATION
Post-Anesthesia Evaluation and Assessment    Patient: Cherelle Barrera MRN: [id number]  SSN: [id number]    YOB: 1990  Age: 28 y.o. Sex: female       Cardiovascular Function/Vital Signs  Visit Vitals    /72    Pulse 91    Temp 36.8 °C (98.2 °F)    Resp 18    Ht 5' 7\" (1.702 m)    Wt 112 kg (246 lb 14.6 oz)    SpO2 97%    BMI 38.67 kg/m2       Patient is status post general anesthesia for Procedure(s):  LOOP ELECTRODE EXCISION PROCEDURE OF CERVIX (LEEP). Nausea/Vomiting: None    Postoperative hydration reviewed and adequate. Pain:  Pain Scale 1: Numeric (0 - 10) (01/26/18 1333)  Pain Intensity 1: 5 (01/26/18 1333)   Managed    Neurological Status:   Neuro (WDL): Exceptions to WDL (01/26/18 1312)  Neuro  Neurologic State: Drowsy; Eyes open spontaneously (01/26/18 1312)  Orientation Level: Oriented to person (01/26/18 1312)  Cognition: Follows commands (01/26/18 1312)  Speech: Clear (01/26/18 1312)  LUE Motor Response: Purposeful;Spontaneous  (01/26/18 1312)  LLE Motor Response: Purposeful;Spontaneous  (01/26/18 1312)  RUE Motor Response: Purposeful;Spontaneous  (01/26/18 1312)  RLE Motor Response: Purposeful;Spontaneous  (01/26/18 1312)   At baseline    Mental Status and Level of Consciousness: Arousable    Pulmonary Status:   O2 Device: Room air (01/26/18 1315)   Adequate oxygenation and airway patent    Complications related to anesthesia: None    Post-anesthesia assessment completed.  No concerns    Signed By: Sol Andersen MD     January 26, 2018

## 2018-01-26 NOTE — DISCHARGE INSTRUCTIONS
PATIENT INSTRUCTIONS:    After general anesthesia or intravenous sedation, for 24 hours or while taking prescription Narcotics:  · Limit your activities  · Do not drive and operate hazardous machinery  · Do not make important personal or business decisions  · Do  not drink alcoholic beverages  · If you have not urinated within 8 hours after discharge, please contact your surgeon on call. Report the following to your surgeon:  · Excessive pain, swelling, redness or odor of or around the surgical area  · Temperature over 100.5  · Nausea and vomiting lasting longer than 4 hours or if unable to take medications  · Any signs of decreased circulation or nerve impairment to extremity: change in color, persistent  numbness, tingling, coldness or increase pain  · Any questions    COUGH AND DEEP BREATHE    Breathing deep and coughing are very important exercises to do after surgery. Deep breathing and coughing open the little air tubes and air sacks in your lungs. You take deep breaths every day. You may not even notice - it is just something you do when you sigh or yawn. It is a natural exercise you do to keep these air passages open. After surgery, take deep breaths and cough, on purpose. Coughing and deep breathing help prevent bronchitis and pneumonia after surgery. If you had chest or belly surgery, use a pillow as a \"hug buddy\" and hold it tightly to your chest or belly when you cough. DIRECTIONS:  6. Take 10 to 15 slow deep breaths every hour while awake. 7. Breathe in deeply, and hold it for 2 seconds. 8. Exhale slowly through puckered lips, like blowing up a balloon. 9. After every 4th or 5th deep breath, hug your pillow to your chest or belly and give a hard, deep cough. Yes, it will probably hurt. But doing this exercise is very important part of healing after surgery. Take your pain medicine to help you do this exercise without too much pain.     IF YOU HAVE BEEN DIAGNOSED WITH SLEEP APNEA, PLEASE USE YOUR SLEEP APNEA DEVICE OR CPAP MACHINE WHEN YOU INTEND TO NAP AFTER TAKING PAIN MEDICATION. Ankle Pumps    Ankle pumps increase the circulation of oxygenated blood to your lower extremities and decrease your risk for circulation problems such as blood clots. They also stretch the muscles, tendons and ligaments in your foot and ankle, and prevent joint contracture in the ankle and foot, especially after surgeries on the legs. It is important to do ankle pump exercises regularly after surgery because immobility increases your risk for developing a blood clot. Your doctor may also have you take an Aspirin for the next few days as well. If your doctor did not ask you to take an Aspirin, consult with him before starting Aspirin therapy on your own. Slowly point your foot forward, feeling the muscles on the top of your lower leg stretch, and hold this position for 5 seconds. Next, pull your foot back toward you as far as possible, stretching the calf muscles, and hold that position for 5 seconds. Repeat with the other foot. Perform 10 repetitions every hour while awake for both ankles if possible (down and then up with the foot once is one repetition). You should feel gentle stretching of the muscles in your lower leg when doing this exercise. If you feel pain, or your range of motion is limited, don't  Push too hard. Only go the limit your joint and muscles will let you go. If you have increasing pain, progressively worsening leg warmth or swelling, STOP the exercise and call your doctor. These are general instructions for a healthy lifestyle:    *  Please give a list of your current medications to your Primary Care Provider. *  Please update this list whenever your medications are discontinued, doses are      changed, or new medications (including over-the-counter products) are added.   *  Please carry medication information at all times in case of emergency situations. About Smoking  No smoking / No tobacco products / Avoid exposure to second hand smoke    Surgeon General's Warning:  Quitting smoking now greatly reduces serious risk to your health. Obesity, smoking, and sedentary lifestyle greatly increases your risk for illness and disease. A healthy diet, regular physical exercise & weight monitoring are important for maintaining a healthy lifestyle. Congestive Heart Failure  You may be retaining fluid if you have a history of heart failure or if you experience any of the following symptoms:  Weight gain of 3 pounds or more overnight or 5 pounds in a week, increased swelling in our hands or feet or shortness of breath while lying flat in bed. Please call your doctor as soon as you notice any of these symptoms; do not wait until your next office visit. Recognize signs and symptoms of STROKE:  F - face looks uneven  A - arms unable to move or move even  S - speech slurred or non-existent  T - time-call 911 as soon as signs and symptoms begin-DO NOT go         Back to bed or wait to see if you get better-TIME IS BRAIN. Warning signs of HEART ATTACK  Call 911 if you have these symptoms    · Chest discomfort. Most heart attacks involve discomfort in the center of the chest that lasts more than a few minutes, or that goes away and comes back. It can feel like uncomfortable pressure, squeezing, fullness, or pain. · Discomfort in other areas of the upper body. Symptoms can include pain or discomfort in one or both        Arms, the back, neck, jaw, or stomach. ·  Shortness of breath with or without chest discomfort. · Other signs may include breaking out in a cold sweat, nausea, or lightheadedness    Don't wait more than five minutes to call 911 - MINUTES MATTER! Fast action can save your life. Calling 911 is almost always the fastest way to get lifesaving treatment.   Emergency Medical Services staff can begin treatment when they arrive - up to an hour sooner than if someone gets to the hospital by car.

## 2018-01-26 NOTE — IP AVS SNAPSHOT
[address] 
[phone] Patient: Doretha Melvin MRN: [id number] UYB:[birth year] About your hospitalization You were admitted on:  January 26, 2018 You last received care in the:  [facility] PACU You were discharged on:  January 26, 2018 Why you were hospitalized Your primary diagnosis was:  Not on File Follow-up Information Follow up With Details Comments Contact Info [place]   [address] [facility] [address] [address] [place] [address] 
[phone] Discharge Orders None A check shanelle indicates which time of day the medication should be taken. My Medications CONTINUE taking these medications Instructions Each Dose to Equal  
 Morning Noon Evening Bedtime  
 hydroCHLOROthiazide 25 mg tablet Commonly known as:  HYDRODIURIL Your last dose was: Your next dose is: Take 1 Tab by mouth daily. 25 mg  
    
   
   
   
  
 ibuprofen 800 mg tablet Commonly known as:  MOTRIN Your last dose was: Your next dose is: Take 1 Tab by mouth as needed for Pain. 800 mg  
    
   
   
   
  
 simvastatin 10 mg tablet Commonly known as:  ZOCOR Your last dose was: Your next dose is: Take 1 Tab by mouth nightly. 10 mg Discharge Instructions PATIENT INSTRUCTIONS: 
 
After general anesthesia or intravenous sedation, for 24 hours or while taking prescription Narcotics: · Limit your activities · Do not drive and operate hazardous machinery · Do not make important personal or business decisions · Do  not drink alcoholic beverages · If you have not urinated within 8 hours after discharge, please contact your surgeon on call. Report the following to your surgeon: 
· Excessive pain, swelling, redness or odor of or around the surgical area · Temperature over 100.5 · Nausea and vomiting lasting longer than 4 hours or if unable to take medications · Any signs of decreased circulation or nerve impairment to extremity: change in color, persistent  numbness, tingling, coldness or increase pain · Any questions [address] Breathing deep and coughing are very important exercises to do after surgery. Deep breathing and coughing open the little air tubes and air sacks in your lungs. You take deep breaths every day. You may not even notice - it is just something you do when you sigh or yawn. It is a natural exercise you do to keep these air passages open. After surgery, take deep breaths and cough, on purpose. Coughing and deep breathing help prevent bronchitis and pneumonia after surgery. If you had chest or belly surgery, use a pillow as a \"hug delilah\" and hold it tightly to your chest or belly when you cough. DIRECTIONS: 
6. Take 10 to 15 slow deep breaths every hour while awake. 7. Breathe in deeply, and hold it for 2 seconds. 8. Exhale slowly through puckered lips, like blowing up a balloon. 9. After every 4th or 5th deep breath, hug your pillow to your chest or belly and give a hard, deep cough. Yes, it will probably hurt. But doing this exercise is very important part of healing after surgery. Take your pain medicine to help you do this exercise without too much pain. IF YOU HAVE BEEN DIAGNOSED WITH SLEEP APNEA, PLEASE USE YOUR SLEEP APNEA DEVICE OR CPAP MACHINE WHEN YOU INTEND TO NAP AFTER TAKING PAIN MEDICATION. Ankle Pumps Ankle pumps increase the circulation of oxygenated blood to your lower extremities and decrease your risk for circulation problems such as blood clots. They also stretch the muscles, tendons and ligaments in your foot and ankle, and prevent joint contracture in the ankle and foot, especially after surgeries on the legs. It is important to do ankle pump exercises regularly after surgery because immobility increases your risk for developing a blood clot. Your doctor may also have you take an Aspirin for the next few days as well. If your doctor did not ask you to take an Aspirin, consult with him before starting Aspirin therapy on your own. Slowly point your foot forward, feeling the muscles on the top of your lower leg stretch, and hold this position for 5 seconds. Next, pull your foot back toward you as far as possible, stretching the calf muscles, and hold that position for 5 seconds. Repeat with the other foot. Perform 10 repetitions every hour while awake for both ankles if possible (down and then up with the foot once is one repetition). You should feel gentle stretching of the muscles in your lower leg when doing this exercise. If you feel pain, or your range of motion is limited, don't  Push too hard. Only go the limit your joint and muscles will let you go. If you have increasing pain, progressively worsening leg warmth or swelling, STOP the exercise and call your doctor. These are general instructions for a healthy lifestyle: *  Please give a list of your current medications to your Primary Care Provider. *  Please update this list whenever your medications are discontinued, doses are 
    changed, or new medications (including over-the-counter products) are added. *  Please carry medication information at all times in case of emergency situations. About Smoking No smoking / No tobacco products / Avoid exposure to second hand smoke Surgeon General's Warning:  Quitting smoking now greatly reduces serious risk to your health. Obesity, smoking, and sedentary lifestyle greatly increases your risk for illness and disease. A healthy diet, regular physical exercise & weight monitoring are important for maintaining a healthy lifestyle. Congestive Heart Failure You may be retaining fluid if you have a history of heart failure or if you experience any of the following symptoms:  Weight gain of 3 pounds or more overnight or 5 pounds in a week, increased swelling in our hands or feet or shortness of breath while lying flat in bed. Please call your doctor as soon as you notice any of these symptoms; do not wait until your next office visit. Recognize signs and symptoms of STROKE: 
F - face looks uneven A - arms unable to move or move even S - speech slurred or non-existent T - time-call 911 as soon as signs and symptoms begin-DO NOT go Back to bed or wait to see if you get better-TIME IS BRAIN. Warning signs of HEART ATTACK Call 911 if you have these symptoms · Chest discomfort. Most heart attacks involve discomfort in the center of the chest that lasts more than a few minutes, or that goes away and comes back. It can feel like uncomfortable pressure, squeezing, fullness, or pain. · Discomfort in other areas of the upper body. Symptoms can include pain or discomfort in one or both Arms, the back, neck, jaw, or stomach. ·  Shortness of breath with or without chest discomfort. · Other signs may include breaking out in a cold sweat, nausea, or lightheadedness Don't wait more than five minutes to call 911 - MINUTES MATTER! Fast action can save your life. Calling 911 is almost always the fastest way to get lifesaving treatment. Emergency Medical Services staff can begin treatment when they arrive - up to an hour sooner than if someone gets to the hospital by car. Introducing [facility]! Dear Sara Silver: 
Thank you for requesting a OVGuide account. Our records indicate that you already have an active OVGuide account. You can access your account anytime at [email or web address] Mask Did you know that you can access your hospital and ER discharge instructions at any time in [employer]? You can also review all of your test results from your hospital stay or ER visit. Additional Information If you have questions, please visit the Frequently Asked Questions section of the [employer] website at [email or web address] Remember, MyChart is NOT to be used for urgent needs. For medical emergencies, dial 911. Now available from your iPhone and Android! Providers Seen During Your Hospitalization Provider Specialty Primary office phone Yane Cotton MD Obstetrics & Gynecology [phone] Your Primary Care Physician (PCP) Primary Care Physician Office Phone Office Fax Antionette Kocher N [phone] ** None ** You are allergic to the following Allergen Reactions Lisinopril Swelling Lips swell Recent Documentation Height Weight BMI OB Status Smoking Status 1.702 m 112 kg 38.67 kg/m2 Having regular periods Never Smoker Emergency Contacts Name Discharge Info Relation Home Work Mobile [address] [facility]  Parent [1] [phone] Ama Castorena DISCHARGE CAREGIVER [3] Other Relative [6] [phone] Patient Belongings The following personal items are in your possession at time of discharge: 
  Dental Appliances: None  Visual Aid: None   Hearing Aids/Status: Does not own  Home Medications: None   Jewelry: None  Clothing: Pants, Shirt    Other Valuables: None Discharge Instructions Attachments/References LEEP (LOOP ELECTROSURGICAL EXCISION PROCEDURE): POST-OP (ENGLISH) Patient Handouts Loop Electrosurgical Excision Procedure (LEEP): What to Expect at [facility] Your Recovery You may have mild cramping for several hours after the procedure. A dark brown vaginal discharge during the first week is normal. You can use a sanitary pad for the bleeding. You may also have some spotting for about 3 weeks.  How long it takes to recover will depend on how much was done during the procedure. This care sheet gives you a general idea about how long it will take for you to recover. But each person recovers at a different pace. Follow the steps below to feel better as quickly as possible. How can you care for yourself at home? Activity ? · You should be able to go back to your normal activities in 1 to 3 days. Medicines ? · Your doctor will tell you if and when you can restart your medicines. He or she will also give you instructions about taking any new medicines. ? · If you take blood thinners, such as warfarin (Coumadin), clopidogrel (Plavix), or aspirin, be sure to talk to your doctor. He or she will tell you if and when to start taking those medicines again. Make sure that you understand exactly what your doctor wants you to do. ? · Take an over-the-counter pain medicine, such as acetaminophen (Tylenol), ibuprofen (Advil, Motrin), or naproxen (Aleve). Read and follow all instructions on the label. ? · Do not take two or more pain medicines at the same time unless the doctor told you to. Many pain medicines have acetaminophen, which is Tylenol. Too much acetaminophen (Tylenol) can be harmful. Exercise ? · Do not exercise for 1 to 3 days after the procedure. Other instructions ? · Use a sanitary pad if you have bleeding. ? · Do not have sexual intercourse or use tampons for 3 weeks. Do not douche. This will allow your cervix to heal.  
? · You can take a shower anytime after the procedure. Ask your doctor when it is okay to take a bath. ? · Be sure to have regular follow-up Pap tests. Your doctor can tell you how often you need to have Pap tests. Follow-up care is a key part of your treatment and safety. Be sure to make and go to all appointments, and call your doctor if you are having problems. It's also a good idea to know your test results and keep a list of the medicines you take. When should you call for help? Call 911 anytime you think you may need emergency care. For example, call if: 
? · You passed out (lost consciousness). ? · You have chest pain, are short of breath, or cough up blood. ?Call your doctor now or seek immediate medical care if: 
? · You have pain that does not get better after you take pain medicine. ? · You cannot pass stools or gas. ? · You have signs of infection, such as: 
¨ Increased pain, swelling, warmth, or redness. ¨ A fever. ? · You have bright red vaginal bleeding that soaks one or more pads in an hour, or you have large clots. ? · You have vaginal discharge that has increased in amount or smells bad.  
? · You are sick to your stomach or cannot drink fluids. ? · You have signs of a blood clot in your leg (called a deep vein thrombosis), such as: 
¨ Pain in your calf, back of the knee, thigh, or groin. ¨ Redness or swelling in your leg. ? Watch closely for any changes in your health, and be sure to contact your doctor if you have any problems. Where can you learn more? Go to http://isabel-sayra.info/. Enter ([phone] 2471-2141 in the search box to learn more about \"Loop Electrosurgical Excision Procedure (LEEP): What to Expect at Home. \" Current as of: May 12, 2017 Content Version: 11.4 © 4682-4557 LookAcross. Care instructions adapted under license by [employer] (which disclaims liability or warranty for this information). If you have questions about a medical condition or this instruction, always ask your healthcare professional. [place] [address] any warranty or liability for your use of this information. Please provide this summary of care documentation to your next provider. Signatures-by signing, you are acknowledging that this After Visit Summary has been reviewed with you and you have received a copy.   
  
 
  
    
    
 Patient Signature: ____________________________________________________________ Date:  ____________________________________________________________  
  
Christine Keas Provider Signature:  ____________________________________________________________ Date:  ____________________________________________________________

## 2018-01-26 NOTE — PERIOP NOTES
Reviewed dc instructions with pt and boyfriend. Both verbalize understanding. Pt dressed and david po without c/os. Left via WC.

## 2018-01-26 NOTE — H&P
Gynecology History and Physical    Name: Heriberto Vargas MRN: [id number] SSN: [id number]    YOB: 1990  Age: 28 y.o. Sex: female       Subjective:      Chief complaint:  Abnormal pap with positive David Joseph is a 28 y.o.  female with a history of abnormal pathology. Previous workup included a colposcopy which revealed moderate dysplasia. Previous treatment measures included serial colpos. She is admitted for LEEP    The current method of family planning is Depo-Provera injections. OB History      Para Term  AB Living    1 0 0 0 1 0    SAB TAB Ectopic Molar Multiple Live Births    0 1 0  0         Past Medical History:   Diagnosis Date    Adverse effect of anesthesia     pt states it took \"at least 2 hours to wake up\" after last surgery    Hypercholesterolemia     Hypertension 2011    IUD (intrauterine device) in place 2015    removed     LSIL (low grade squamous intraepithelial lesion) on Pap smear 12    Menorrhagia with regular cycle 2013    Obesity, Class II, BMI 35-39.9 13    BMI 37    Secondary dysmenorrhea 2013     Past Surgical History:   Procedure Laterality Date    HX COLPOSCOPY      HX DILATION AND CURETTAGE      pregnancy related    HX OTHER SURGICAL  2013    laparoscopy/exploratory-uterine issues, checking endometriosis    HX WISDOM TEETH EXTRACTION       Social History     Occupational History    Not on file. Social History Main Topics    Smoking status: Never Smoker    Smokeless tobacco: Never Used    Alcohol use 0.6 oz/week     1 Shots of liquor per week      Comment: on special occasions    Drug use: No    Sexual activity: Yes     Partners: Male     Birth control/ protection: IUD      Comment: Pill use began in 2013.  No other history of hormonal contraceptive use     Family History   Problem Relation Age of Onset    Hypertension Mother     Hypertension Father     Cancer Father      prostate    Hypertension Sister     Hypertension Maternal Aunt     Diabetes Maternal Grandmother     Hypertension Maternal Grandmother     Diabetes Maternal Grandfather     Hypertension Maternal Grandfather     Diabetes Paternal Grandmother     Hypertension Paternal Grandmother     Diabetes Paternal Grandfather     Hypertension Paternal Grandfather         Allergies   Allergen Reactions    Lisinopril Swelling     Lips swell     Prior to Admission medications    Medication Sig Start Date End Date Taking? Authorizing Provider   ibuprofen (MOTRIN) 800 mg tablet Take 1 Tab by mouth as needed for Pain. 1/17/18  Yes Jose Chacko MD   simvastatin (ZOCOR) 10 mg tablet Take 1 Tab by mouth nightly. 1/5/18  Yes Juan Jacobs MD   hydroCHLOROthiazide (HYDRODIURIL) 25 mg tablet Take 1 Tab by mouth daily. 1/5/18  Yes Juan Jacobs MD        Review of Systems:  Constitutional: negative for fevers and chills  Respiratory: negative for cough, asthma or wheezing  Cardiovascular: negative for dyspnea, palpitations, orthopnea  Gastrointestinal: negative for nausea, vomiting and diarrhea  Genitourinary:negative for frequency, dysuria, hematuria and genital lesions and vaginal discharge  Integument/Breast: negative for skin lesion(s)  Hematologic/Lymphatic: negative for easy bruising and petechiae  Musculoskeletal:negative for arthralgias and muscle weakness  Allergic/Immunologic: negative for hay fever and anaphylaxis     Objective:     Vitals:    01/16/18 1326 01/26/18 1013 01/26/18 1022   BP:  (!) 171/101 172/85   Pulse:  (!) 111    Resp:  16    Temp:  98.5 °F (36.9 °C)    SpO2:  99%    Weight: 252 lb (114.3 kg) 246 lb 14.6 oz (112 kg)    Height: 5' 7\" (1.702 m) 5' 7\" (1.702 m)        Physical Exam:  Patient without distress.   Breast: Deferred  Heart: Regular rate and rhythm or S1S2 present  Lung: clear to auscultation throughout lung fields, no wheezes, no rales, no rhonchi and normal respiratory effort  Back: costovertebral angle tenderness absent  Abdomen: soft, nontender  External Genitalia: normal general appearance  Vagina: normal mucosa without prolapse or lesions  Cervix: normal appearance  Adnexa: normal bimanual exam  Uterus: normal single, nontender  Rectal: deferred    Assessment:     Abnormal pap     Plan:     Greater than 50% of the >15 minute face to face visit was spent in counseling and education on options for management of abnormal pap  Questions were answered and she elected to proceed with surgery. She was also given information on what to do post op if she starts bleeding heavily or has any other complications of the surgery.     Signed By:  Flaquita Workman MD     January 26, 2018

## 2018-01-31 NOTE — PROGRESS NOTES
LEEP successful at removing all dysplastic tissue.   Recommend annual pap's for 3 yrs before returning to normal screening schedule

## 2018-02-09 ENCOUNTER — OFFICE VISIT (OUTPATIENT)
Dept: MIDWIFE SERVICES | Age: 28
End: 2018-02-09

## 2018-02-09 VITALS
BODY MASS INDEX: 38.61 KG/M2 | HEIGHT: 67 IN | HEART RATE: 78 BPM | DIASTOLIC BLOOD PRESSURE: 72 MMHG | SYSTOLIC BLOOD PRESSURE: 120 MMHG | WEIGHT: 246 LBS

## 2018-02-09 DIAGNOSIS — Z48.816 AFTERCARE FOLLOWING SURGERY OF THE GENITOURINARY SYSTEM: Primary | ICD-10-CM

## 2018-02-09 NOTE — PROGRESS NOTES
Chief Complaint   Patient presents with    Post OP Follow Up     LEEP 2 weeks ago     Visit Vitals    /72    Pulse 78    Ht 5' 7\" (1.702 m)    Wt 246 lb (111.6 kg)    BMI 38.53 kg/m2     1. Have you been to the ER, urgent care clinic since your last visit? Hospitalized since your last visit? No    2. Have you seen or consulted any other health care providers outside of the [address] [place] [address] since your last visit? Include any pap smears or colon screening.  No     Here today for LEEP follow up

## 2018-02-09 NOTE — PROGRESS NOTES
SUBJECTIVE: Alisha Britt is a 28 y.o. female  is seen for a routine 2 week postop check from LEEP on 18. Past Surgical History:   Procedure Laterality Date    HX COLPOSCOPY      HX DILATION AND CURETTAGE      pregnancy related    HX OTHER SURGICAL  2013    laparoscopy/exploratory-uterine issues, checking endometriosis    HX WISDOM TEETH EXTRACTION      KS CONIZATION CERVIX,LOOP ELECTRD N/A 2018    LOOP ELECTRODE EXCISION PROCEDURE OF CERVIX (LEEP) performed by Juan Jacobs MD at [facility] MAIN OR     Allergies   Allergen Reactions    Lisinopril Swelling     Lips swell     Reports no problems . Activity, diet and bowels are normal. Pain none. Review of Systems - Negative   OBJECTIVE:   Blood pressure 120/72, pulse 78, height 5' 7\" (1.702 m), weight 246 lb (111.6 kg), last menstrual period 01/15/2018. Appears well. Wound: none    ASSESSMENT / PLAN  Diagnoses and all orders for this visit:    1. Aftercare following surgery of the genitourinary system      Follow-up Disposition:  Return in about 1 year (around 2019) for Annual with pap, referred to [facility] for follow up.

## 2018-02-12 ENCOUNTER — HOSPITAL ENCOUNTER (EMERGENCY)
Age: 28
Discharge: HOME | End: 2018-02-13
Attending: EMERGENCY MEDICINE
Payer: SELF-PAY

## 2018-02-12 ENCOUNTER — APPOINTMENT (OUTPATIENT)
Dept: ULTRASOUND IMAGING | Age: 28
End: 2018-02-12
Attending: PHYSICIAN ASSISTANT
Payer: SELF-PAY

## 2018-02-12 DIAGNOSIS — N93.9 VAGINAL BLEEDING: Primary | ICD-10-CM

## 2018-02-12 LAB
ALBUMIN SERPL-MCNC: 3.7 G/DL (ref 3.5–5)
ALBUMIN/GLOB SERPL: 0.8 {RATIO} (ref 1.1–2.2)
ALP SERPL-CCNC: 77 U/L (ref 45–117)
ALT SERPL-CCNC: 15 U/L (ref 12–78)
ANION GAP SERPL CALC-SCNC: 9 MMOL/L (ref 5–15)
APPEARANCE UR: ABNORMAL
AST SERPL-CCNC: 15 U/L (ref 15–37)
BACTERIA URNS QL MICRO: NEGATIVE /HPF
BASOPHILS # BLD: 0 K/UL (ref 0–0.1)
BASOPHILS NFR BLD: 0 % (ref 0–1)
BILIRUB SERPL-MCNC: 0.2 MG/DL (ref 0.2–1)
BILIRUB UR QL: NEGATIVE
BUN SERPL-MCNC: 9 MG/DL (ref 6–20)
BUN/CREAT SERPL: 7 (ref 12–20)
CALCIUM SERPL-MCNC: 8.9 MG/DL (ref 8.5–10.1)
CHLORIDE SERPL-SCNC: 101 MMOL/L (ref 97–108)
CLUE CELLS VAG QL WET PREP: NORMAL
CO2 SERPL-SCNC: 27 MMOL/L (ref 21–32)
COLOR UR: ABNORMAL
CREAT SERPL-MCNC: 1.24 MG/DL (ref 0.55–1.02)
DIFFERENTIAL METHOD BLD: ABNORMAL
EOSINOPHIL # BLD: 0.1 K/UL (ref 0–0.4)
EOSINOPHIL NFR BLD: 1 % (ref 0–7)
EPITH CASTS URNS QL MICRO: ABNORMAL /LPF
ERYTHROCYTE [DISTWIDTH] IN BLOOD BY AUTOMATED COUNT: 16.6 % (ref 11.5–14.5)
GLOBULIN SER CALC-MCNC: 4.7 G/DL (ref 2–4)
GLUCOSE SERPL-MCNC: 90 MG/DL (ref 65–100)
GLUCOSE UR STRIP.AUTO-MCNC: NEGATIVE MG/DL
HCG UR QL: NEGATIVE
HCT VFR BLD AUTO: 30 % (ref 35–47)
HGB BLD-MCNC: 9.4 G/DL (ref 11.5–16)
HGB UR QL STRIP: ABNORMAL
IMM GRANULOCYTES # BLD: 0.1 K/UL (ref 0–0.04)
IMM GRANULOCYTES NFR BLD AUTO: 1 % (ref 0–0.5)
KETONES UR QL STRIP.AUTO: ABNORMAL MG/DL
KOH PREP SPEC: NORMAL
LEUKOCYTE ESTERASE UR QL STRIP.AUTO: ABNORMAL
LYMPHOCYTES # BLD: 2.9 K/UL (ref 0.8–3.5)
LYMPHOCYTES NFR BLD: 29 % (ref 12–49)
MCH RBC QN AUTO: 23.7 PG (ref 26–34)
MCHC RBC AUTO-ENTMCNC: 31.3 G/DL (ref 30–36.5)
MCV RBC AUTO: 75.6 FL (ref 80–99)
MONOCYTES # BLD: 0.7 K/UL (ref 0–1)
MONOCYTES NFR BLD: 7 % (ref 5–13)
NEUTS SEG # BLD: 6.2 K/UL (ref 1.8–8)
NEUTS SEG NFR BLD: 62 % (ref 32–75)
NITRITE UR QL STRIP.AUTO: NEGATIVE
NRBC # BLD: 0 K/UL (ref 0–0.01)
NRBC BLD-RTO: 0 PER 100 WBC
PH UR STRIP: 6 [PH] (ref 5–8)
PLATELET # BLD AUTO: 360 K/UL (ref 150–400)
PMV BLD AUTO: 9.7 FL (ref 8.9–12.9)
POTASSIUM SERPL-SCNC: 3.4 MMOL/L (ref 3.5–5.1)
PROT SERPL-MCNC: 8.4 G/DL (ref 6.4–8.2)
PROT UR STRIP-MCNC: 100 MG/DL
RBC # BLD AUTO: 3.97 M/UL (ref 3.8–5.2)
RBC #/AREA URNS HPF: >100 /HPF (ref 0–5)
SERVICE CMNT-IMP: NORMAL
SODIUM SERPL-SCNC: 137 MMOL/L (ref 136–145)
SP GR UR REFRACTOMETRY: 1.02 (ref 1–1.03)
T VAGINALIS VAG QL WET PREP: NORMAL
UR CULT HOLD, URHOLD: NORMAL
UROBILINOGEN UR QL STRIP.AUTO: 0.2 EU/DL (ref 0.2–1)
WBC # BLD AUTO: 10 K/UL (ref 3.6–11)
WBC URNS QL MICRO: ABNORMAL /HPF (ref 0–4)

## 2018-02-12 PROCEDURE — 96374 THER/PROPH/DIAG INJ IV PUSH: CPT

## 2018-02-12 PROCEDURE — 36415 COLL VENOUS BLD VENIPUNCTURE: CPT | Performed by: PHYSICIAN ASSISTANT

## 2018-02-12 PROCEDURE — 87210 SMEAR WET MOUNT SALINE/INK: CPT | Performed by: PHYSICIAN ASSISTANT

## 2018-02-12 PROCEDURE — 76830 TRANSVAGINAL US NON-OB: CPT

## 2018-02-12 PROCEDURE — 96361 HYDRATE IV INFUSION ADD-ON: CPT

## 2018-02-12 PROCEDURE — 85025 COMPLETE CBC W/AUTO DIFF WBC: CPT | Performed by: PHYSICIAN ASSISTANT

## 2018-02-12 PROCEDURE — 74011250636 HC RX REV CODE- 250/636: Performed by: PHYSICIAN ASSISTANT

## 2018-02-12 PROCEDURE — 86850 RBC ANTIBODY SCREEN: CPT | Performed by: PHYSICIAN ASSISTANT

## 2018-02-12 PROCEDURE — 99284 EMERGENCY DEPT VISIT MOD MDM: CPT

## 2018-02-12 PROCEDURE — 81001 URINALYSIS AUTO W/SCOPE: CPT | Performed by: PHYSICIAN ASSISTANT

## 2018-02-12 PROCEDURE — 81025 URINE PREGNANCY TEST: CPT

## 2018-02-12 PROCEDURE — 76856 US EXAM PELVIC COMPLETE: CPT

## 2018-02-12 PROCEDURE — 87491 CHLMYD TRACH DNA AMP PROBE: CPT | Performed by: PHYSICIAN ASSISTANT

## 2018-02-12 PROCEDURE — 80053 COMPREHEN METABOLIC PANEL: CPT | Performed by: PHYSICIAN ASSISTANT

## 2018-02-12 RX ORDER — TRAMADOL HYDROCHLORIDE 50 MG/1
50 TABLET ORAL
Qty: 10 TAB | Refills: 0 | Status: SHIPPED | OUTPATIENT
Start: 2018-02-12 | End: 2021-07-27

## 2018-02-12 RX ORDER — KETOROLAC TROMETHAMINE 30 MG/ML
30 INJECTION, SOLUTION INTRAMUSCULAR; INTRAVENOUS
Status: COMPLETED | OUTPATIENT
Start: 2018-02-12 | End: 2018-02-12

## 2018-02-12 RX ADMIN — SODIUM CHLORIDE 1000 ML: 900 INJECTION, SOLUTION INTRAVENOUS at 19:16

## 2018-02-12 RX ADMIN — KETOROLAC TROMETHAMINE 30 MG: 30 INJECTION, SOLUTION INTRAMUSCULAR at 21:15

## 2018-02-12 NOTE — LETTER
[address] 
[facility] EMERGENCY DEPT 
[address] [facility] [place] [address] [id number] [phone] Work/School Note Date: 2/12/2018 To Whom It May concern: 
 
Kendra Fulton was seen and treated today in the emergency room by the following provider(s): 
Attending Provider: Christi Gaines MD 
Physician Assistant: BREE Lainez. Kendra Fulton may return to work on 2/14/2018. Sincerely, Triston Browning RN

## 2018-02-13 VITALS
BODY MASS INDEX: 38.75 KG/M2 | OXYGEN SATURATION: 100 % | HEART RATE: 80 BPM | HEIGHT: 67 IN | TEMPERATURE: 98 F | RESPIRATION RATE: 18 BRPM | WEIGHT: 246.91 LBS | SYSTOLIC BLOOD PRESSURE: 119 MMHG | DIASTOLIC BLOOD PRESSURE: 41 MMHG

## 2018-02-13 LAB
ABO + RH BLD: NORMAL
BLOOD GROUP ANTIBODIES SERPL: NORMAL
SPECIMEN EXP DATE BLD: NORMAL

## 2018-02-13 NOTE — DISCHARGE INSTRUCTIONS
Abnormal Uterine Bleeding: Care Instructions  Your Care Instructions    Abnormal uterine bleeding (AUB) is irregular bleeding from the uterus that is longer or heavier than usual or does not occur at your regular time. Sometimes it is caused by changes in hormone levels. It can also be caused by growths in the uterus, such as fibroids or polyps. Sometimes a cause cannot be found. You may have heavy bleeding when you are not expecting your period. Your doctor may suggest a pregnancy test, if you think you are pregnant. Follow-up care is a key part of your treatment and safety. Be sure to make and go to all appointments, and call your doctor if you are having problems. It's also a good idea to know your test results and keep a list of the medicines you take. How can you care for yourself at home? · Be safe with medicines. Take pain medicines exactly as directed. ¨ If the doctor gave you a prescription medicine for pain, take it as prescribed. ¨ If you are not taking a prescription pain medicine, ask your doctor if you can take an over-the-counter medicine. · You may be low in iron because of blood loss. Eat a balanced diet that is high in iron and vitamin C. Foods rich in iron include red meat, shellfish, eggs, beans, and leafy green vegetables. Talk to your doctor about whether you need to take iron pills or a multivitamin. When should you call for help? Call 911 anytime you think you may need emergency care. For example, call if:  ? · You passed out (lost consciousness). ?Call your doctor now or seek immediate medical care if:  ? · You have new or worse belly or pelvic pain. ? · You have severe vaginal bleeding. ? · You feel dizzy or lightheaded, or you feel like you may faint. ? Watch closely for changes in your health, and be sure to contact your doctor if:  ? · You think you may be pregnant. ? · Your bleeding gets worse. ? · You do not get better as expected.    Where can you learn more?  Go to http://isabel-sayra.info/. Enter Q112 in the search box to learn more about \"Abnormal Uterine Bleeding: Care Instructions. \"  Current as of: October 13, 2016  Content Version: 11.4  © 2043-3905 [employer]. Care instructions adapted under license by [employer] (which disclaims liability or warranty for this information). If you have questions about a medical condition or this instruction, always ask your healthcare professional. [address] any warranty or liability for your use of this information. We hope that we have addressed all of your medical concerns. The examination and treatment you received in the Emergency Department were for an emergent problem and were not intended as complete care. It is important that you follow up with your healthcare provider(s) for ongoing care. If your symptoms worsen or do not improve as expected, and you are unable to reach your usual health care provider(s), you should return to the Emergency Department. Today's healthcare is undergoing tremendous change, and patient satisfaction surveys are one of the many tools to assess the quality of medical care. You may receive a survey from the [facility] regarding your experience in the Emergency Department. I hope that your experience has been completely positive, particularly the medical care that I provided. As such, please participate in the survey; anything less than excellent does not meet my expectations or intentions. [facility] and [facility] participate in nationally recognized quality of care measures. If your blood pressure is greater than 120/80, as reported below, we urge that you seek medical care to address the potential of high blood pressure, commonly known as hypertension.    Hypertension can be hereditary or can be caused by certain medical conditions, pain, stress, or \"white coat syndrome. \"       Please make an appointment with your health care provider(s) for follow up of your Emergency Department visit. VITALS:   Patient Vitals for the past 8 hrs:   Temp Pulse Resp BP SpO2   02/12/18 1808 98 °F (36.7 °C) 80 18 (!) 161/109 100 %          Thank you for allowing us to provide you with medical care today. We realize that you have many choices for your emergency care needs. Please choose us in the future for any continued health care needs. Tej Catherine, [address] [place], [place] [address]y 20.   Office: [phone]            Recent Results (from the past 24 hour(s))   CBC WITH AUTOMATED DIFF    Collection Time: 02/12/18  8:28 PM   Result Value Ref Range    WBC 10.0 3.6 - 11.0 K/uL    RBC 3.97 3.80 - 5.20 M/uL    HGB 9.4 (L) 11.5 - 16.0 g/dL    HCT 30.0 (L) 35.0 - 47.0 %    MCV 75.6 (L) 80.0 - 99.0 FL    MCH 23.7 (L) 26.0 - 34.0 PG    MCHC 31.3 30.0 - 36.5 g/dL    RDW 16.6 (H) 11.5 - 14.5 %    PLATELET 373 876 - 086 K/uL    MPV 9.7 8.9 - 12.9 FL    NRBC 0.0 0  WBC    ABSOLUTE NRBC 0.00 0.00 - 0.01 K/uL    NEUTROPHILS 62 32 - 75 %    LYMPHOCYTES 29 12 - 49 %    MONOCYTES 7 5 - 13 %    EOSINOPHILS 1 0 - 7 %    BASOPHILS 0 0 - 1 %    IMMATURE GRANULOCYTES 1 (H) 0.0 - 0.5 %    ABS. NEUTROPHILS 6.2 1.8 - 8.0 K/UL    ABS. LYMPHOCYTES 2.9 0.8 - 3.5 K/UL    ABS. MONOCYTES 0.7 0.0 - 1.0 K/UL    ABS. EOSINOPHILS 0.1 0.0 - 0.4 K/UL    ABS. BASOPHILS 0.0 0.0 - 0.1 K/UL    ABS. IMM.  GRANS. 0.1 (H) 0.00 - 0.04 K/UL    DF AUTOMATED     URINALYSIS W/MICROSCOPIC    Collection Time: 02/12/18  8:28 PM   Result Value Ref Range    Color RED      Appearance BLOODY (A) CLEAR      Specific gravity 1.019 1.003 - 1.030      pH (UA) 6.0 5.0 - 8.0      Protein 100 (A) NEG mg/dL    Glucose NEGATIVE  NEG mg/dL    Ketone TRACE (A) NEG mg/dL    Bilirubin NEGATIVE  NEG      Blood LARGE (A) NEG      Urobilinogen 0.2 0.2 - 1.0 EU/dL    Nitrites NEGATIVE  NEG Leukocyte Esterase TRACE (A) NEG      WBC 10-20 0 - 4 /hpf    RBC >100 (H) 0 - 5 /hpf    Epithelial cells FEW FEW /lpf    Bacteria NEGATIVE  NEG /hpf   URINE CULTURE HOLD SAMPLE    Collection Time: 02/12/18  8:28 PM   Result Value Ref Range    Urine culture hold        URINE ON HOLD IN MICROBIOLOGY DEPT FOR 3 DAYS. IF UNPRESERVED URINE IS SUBMITTED, IT CANNOT BE USED FOR ADDITIONAL TESTING AFTER 24 HRS, RECOLLECTION WILL BE REQUIRED. METABOLIC PANEL, COMPREHENSIVE    Collection Time: 02/12/18  8:28 PM   Result Value Ref Range    Sodium 137 136 - 145 mmol/L    Potassium 3.4 (L) 3.5 - 5.1 mmol/L    Chloride 101 97 - 108 mmol/L    CO2 27 21 - 32 mmol/L    Anion gap 9 5 - 15 mmol/L    Glucose 90 65 - 100 mg/dL    BUN 9 6 - 20 MG/DL    Creatinine 1.24 (H) 0.55 - 1.02 MG/DL    BUN/Creatinine ratio 7 (L) 12 - 20      GFR est AA >60 >60 ml/min/1.73m2    GFR est non-AA 52 (L) >60 ml/min/1.73m2    Calcium 8.9 8.5 - 10.1 MG/DL    Bilirubin, total 0.2 0.2 - 1.0 MG/DL    ALT (SGPT) 15 12 - 78 U/L    AST (SGOT) 15 15 - 37 U/L    Alk. phosphatase 77 45 - 117 U/L    Protein, total 8.4 (H) 6.4 - 8.2 g/dL    Albumin 3.7 3.5 - 5.0 g/dL    Globulin 4.7 (H) 2.0 - 4.0 g/dL    A-G Ratio 0.8 (L) 1.1 - 2.2     TYPE & SCREEN    Collection Time: 02/12/18  8:28 PM   Result Value Ref Range    Crossmatch Expiration 02/15/2018     ABO/Rh(D) A POSITIVE     Antibody screen NEG    KOH, OTHER SOURCES    Collection Time: 02/12/18  9:12 PM   Result Value Ref Range    Special Requests: NO SPECIAL REQUESTS      KOH NO YEAST SEEN     WET PREP    Collection Time: 02/12/18  9:12 PM   Result Value Ref Range    Clue cells CLUE CELLS ABSENT      Wet prep NO TRICHOMONAS SEEN     HCG URINE, QL. - POC    Collection Time: 02/12/18  9:19 PM   Result Value Ref Range    Pregnancy test,urine (POC) NEGATIVE  NEG         Us Transvaginal    Result Date: 2/12/2018  INDICATION:  heavy vagial bleeding post leep procedure. EXAM: PELVIC AND TRANSVAGINAL ULTRASONOGRAPHY. COMPARISON: 6/26/2016 . PROCEDURE: The pelvis was scanned via high resolution real-time linear array sonography, using both the transabdominal and transvaginal approach, which was required for delineation of the endometrium and ovaries . FINDINGS TRANSABDOMINAL:  The UTERUS MEASURES 11.2 x 6.7 x 7.1 cm. The central endometrium measures 12mm in thickness. There is no focal endometrial mass or fluid collection. There may be a 2.1 x 1.7 x 1.4 cm myometrial mass in the posterior uterine fundus. There is no free fluid in the cul-de-sac. The ovaries cannot be seen because of overlying bowel gas obscuring the adnexal regions. FINDINGS TRANSVAGINAL:  The uterus shows prominent overall size at 11.2 x 7.0 x 6.7 cm. In the uterine fundus to the right of midline, a heterogeneous mass measures 1.9 x 2.5 x 1.4 cm, suggesting a leiomyoma. The myometrium is generally mildly heterogeneous. The endometrium is poorly delineated but is thought to measure 5 mm in the lower uterine segment. It is not well seen in the upper uterus, possibly due to multiple intramural leiomyomata. . Further evaluation of the ovaries transvaginally reveals the ovaries cannot be visualized. IMPRESSION: 1. Transabdominal pelvic ultrasound revealing uterine enlargement and heterogeneity with poor visualization of the endometrium, where there may potentially be blood products or other echogenic debris. Ovaries not visualized. . 2. Transvaginal pelvic ultrasound revealing uterine enlargement and heterogeneity with a possible fundal leiomyoma. Poor visualization of the endometrium raises a question of echogenic debris or blood in the endometrial canal. Ovaries not seen. . 3. Obstetric and sonographic follow-up are suggested. Us Pelv Non Obs    Result Date: 2/12/2018  INDICATION:  heavy vagial bleeding post leep procedure. EXAM: PELVIC AND TRANSVAGINAL ULTRASONOGRAPHY. COMPARISON: 6/26/2016 .  PROCEDURE: The pelvis was scanned via high resolution real-time linear array sonography, using both the transabdominal and transvaginal approach, which was required for delineation of the endometrium and ovaries . FINDINGS TRANSABDOMINAL:  The UTERUS MEASURES 11.2 x 6.7 x 7.1 cm. The central endometrium measures 12mm in thickness. There is no focal endometrial mass or fluid collection. There may be a 2.1 x 1.7 x 1.4 cm myometrial mass in the posterior uterine fundus. There is no free fluid in the cul-de-sac. The ovaries cannot be seen because of overlying bowel gas obscuring the adnexal regions. FINDINGS TRANSVAGINAL:  The uterus shows prominent overall size at 11.2 x 7.0 x 6.7 cm. In the uterine fundus to the right of midline, a heterogeneous mass measures 1.9 x 2.5 x 1.4 cm, suggesting a leiomyoma. The myometrium is generally mildly heterogeneous. The endometrium is poorly delineated but is thought to measure 5 mm in the lower uterine segment. It is not well seen in the upper uterus, possibly due to multiple intramural leiomyomata. . Further evaluation of the ovaries transvaginally reveals the ovaries cannot be visualized. IMPRESSION: 1. Transabdominal pelvic ultrasound revealing uterine enlargement and heterogeneity with poor visualization of the endometrium, where there may potentially be blood products or other echogenic debris. Ovaries not visualized. . 2. Transvaginal pelvic ultrasound revealing uterine enlargement and heterogeneity with a possible fundal leiomyoma. Poor visualization of the endometrium raises a question of echogenic debris or blood in the endometrial canal. Ovaries not seen. . 3. Obstetric and sonographic follow-up are suggested.

## 2018-02-13 NOTE — ED PROVIDER NOTES
HPI Comments: Silvana Reynolds is a 28 y.o. female  who presents by private vehicle to ER with c/o Patient presents with:  Vaginal Bleeding. Patient with vaginal bleeding that started yesterday with heavy vaginal bleeding with large clots. Patient reports lower abdominal pain. Denies feeling dizzy or light headed. Patient had Leep procedure on 1/28/18 by Dr. Madison Hebert. Patient had follow up visit a few days ago with no problems. Patient reports history of heavy periods. She specifically denies any fevers, chills, nausea, vomiting, chest pain, shortness of breath, headache, rash, diarrhea,  urinary/bowel changes, sweating or weight loss. PCP: Peter Cuello MD   PMHx significant for: Past Medical History:  No date: Adverse effect of anesthesia      Comment: pt states it took \"at least 2 hours to wake                up\" after last surgery  No date: Hypercholesterolemia  1/28/2011: Hypertension  07/23/2015: IUD (intrauterine device) in place      Comment: removed 2015 12/4/12: LSIL (low grade squamous intraepithelial lesio*  7/2/2013: Menorrhagia with regular cycle  9/23/13: Obesity, Class II, BMI 35-39.9      Comment: BMI 37  7/2/2013: Secondary dysmenorrhea   PSHx significant for: Past Surgical History:  2012: HX COLPOSCOPY  2008: HX DILATION AND CURETTAGE      Comment: pregnancy related  11/2013: HX OTHER SURGICAL      Comment: laparoscopy/exploratory-uterine issues,                checking endometriosis  No date: HX WISDOM TEETH EXTRACTION  1/26/2018: [place] CONIZATION CERVIX,LOOP ELECTRD N/A      Comment: LOOP ELECTRODE EXCISION PROCEDURE OF CERVIX                (LEEP) performed by Serena Majano MD at [address]  Social Hx: Tobacco use: Smoking status: Never Smoker                                                              Smokeless status: Never Used                      ; EtOH use: The patient states she drinks 1 per week.; Illicit Drug use:      Allergies:   -- Lisinopril -- Swelling    --  Lips swell    There are no other complaints, changes or physical findings at this time. Patient is a 28 y.o. female presenting with vaginal bleeding. The history is provided by the patient. Vaginal Bleeding   This is a new problem. The current episode started yesterday. The problem occurs constantly. The problem has not changed since onset. Associated symptoms include abdominal pain. Pertinent negatives include no chest pain, no headaches and no shortness of breath. Nothing aggravates the symptoms. Nothing relieves the symptoms. She has tried nothing for the symptoms.         Past Medical History:   Diagnosis Date    Adverse effect of anesthesia     pt states it took \"at least 2 hours to wake up\" after last surgery    Hypercholesterolemia     Hypertension 1/28/2011    IUD (intrauterine device) in place 07/23/2015    removed 2015    LSIL (low grade squamous intraepithelial lesion) on Pap smear 12/4/12    Menorrhagia with regular cycle 7/2/2013    Obesity, Class II, BMI 35-39.9 9/23/13    BMI 37    Secondary dysmenorrhea 7/2/2013       Past Surgical History:   Procedure Laterality Date    HX COLPOSCOPY  2012    HX DILATION AND CURETTAGE  2008    pregnancy related    HX OTHER SURGICAL  11/2013    laparoscopy/exploratory-uterine issues, checking endometriosis    HX WISDOM TEETH EXTRACTION      [place] CONIZATION CERVIX,LOOP ELECTRD N/A 1/26/2018    LOOP ELECTRODE EXCISION PROCEDURE OF CERVIX (LEEP) performed by Arcenio Killian MD at [facility] MAIN OR         Family History:   Problem Relation Age of Onset    Hypertension Mother     Hypertension Father     Cancer Father      prostate    Hypertension Sister     Hypertension Maternal Aunt     Diabetes Maternal Grandmother     Hypertension Maternal Grandmother     Diabetes Maternal Grandfather     Hypertension Maternal Grandfather     Diabetes Paternal Grandmother     Hypertension Paternal Grandmother     Diabetes Paternal Grandfather     Hypertension Paternal Grandfather        Social History     Social History    Marital status: SINGLE     Spouse name: N/A    Number of children: N/A    Years of education: N/A     Occupational History    Not on file. Social History Main Topics    Smoking status: Never Smoker    Smokeless tobacco: Never Used    Alcohol use 0.6 oz/week     1 Shots of liquor per week      Comment: on special occasions    Drug use: No    Sexual activity: Yes     Partners: Male     Birth control/ protection: IUD      Comment: Pill use began in July 2013. No other history of hormonal contraceptive use     Other Topics Concern    Not on file     Social History Narrative         ALLERGIES: Lisinopril    Review of Systems   Constitutional: Negative. HENT: Negative. Eyes: Negative. Respiratory: Negative. Negative for shortness of breath. Cardiovascular: Negative. Negative for chest pain. Gastrointestinal: Positive for abdominal pain. Endocrine: Negative. Genitourinary: Positive for vaginal bleeding. Musculoskeletal: Negative. Skin: Negative. Allergic/Immunologic: Negative. Neurological: Negative. Negative for headaches. Hematological: Negative. Psychiatric/Behavioral: Negative. All other systems reviewed and are negative. Vitals:    02/12/18 1808   BP: (!) 161/109   Pulse: 80   Resp: 18   Temp: 98 °F (36.7 °C)   SpO2: 100%   Weight: 112 kg (246 lb 14.6 oz)   Height: 5' 7\" (1.702 m)            Physical Exam   Constitutional: She is oriented to person, place, and time. She appears well-developed. Non-toxic appearance. She does not have a sickly appearance. She does not appear ill. No distress. HENT:   Head: Normocephalic and atraumatic. Right Ear: External ear normal.   Left Ear: External ear normal.   Nose: Nose normal.   Mouth/Throat: Oropharynx is clear and moist. No oropharyngeal exudate.    Eyes: Conjunctivae, EOM and lids are normal. Right eye exhibits no discharge. Left eye exhibits no discharge. Neck: Normal range of motion. No tracheal deviation present. No thyromegaly present. Cardiovascular: Normal rate, regular rhythm, normal heart sounds and intact distal pulses. Pulmonary/Chest: Effort normal and breath sounds normal.   Abdominal: Soft. Normal appearance and bowel sounds are normal. There is tenderness in the suprapubic area. Genitourinary: Pelvic exam was performed with patient supine. Cervix exhibits no motion tenderness, no discharge and no friability. Right adnexum displays no mass and no fullness. Left adnexum displays no mass and no fullness. There is bleeding in the vagina. No erythema or tenderness in the vagina. No foreign body in the vagina. No signs of injury around the vagina. No vaginal discharge found. Genitourinary Comments: 1 cm clots removed from vaginal vault. Cervix closed. Musculoskeletal: Normal range of motion. Neurological: She is alert and oriented to person, place, and time. Skin: Skin is warm and dry. Psychiatric: She has a normal mood and affect. Judgment normal.        MDM  Number of Diagnoses or Management Options  Vaginal bleeding:   Diagnosis management comments: Assesment/Plan- 28 y.o. Patient presents with:  Vaginal Bleeding  differential includes: dysfunctional uterine bleeding, post op complication. Labs and imaging reviewed with hemoglobin stable, US showing uterine enlargement and leiomyoma. . Recommend ob-gyn follow up. Patient educated on reasons to return to the ED. Amount and/or Complexity of Data Reviewed  Clinical lab tests: reviewed and ordered  Tests in the radiology section of CPT®: ordered and reviewed  Tests in the medicine section of CPT®: ordered and reviewed          ED Course       Procedures                 CONSULT NOTE:   12:41 AM  Silvana Hutton PA-C spoke with Dr. Jennifer Wong,   Specialty: Ob- hospitalist  Discussed pt's hx, disposition, and available diagnostic and imaging results. Reviewed care plans. Consultant agrees with plans as outlined. Recommends discharge with follow up with ob-gyn.

## 2018-02-14 LAB
C TRACH DNA SPEC QL NAA+PROBE: NEGATIVE
N GONORRHOEA DNA SPEC QL NAA+PROBE: NEGATIVE
SAMPLE TYPE: NORMAL
SERVICE CMNT-IMP: NORMAL
SPECIMEN SOURCE: NORMAL

## 2018-04-11 RX ORDER — FLUCONAZOLE 150 MG/1
150 TABLET ORAL DAILY
Qty: 2 TAB | Refills: 0 | Status: SHIPPED | OUTPATIENT
Start: 2018-04-11 | End: 2018-04-12

## 2018-04-23 ENCOUNTER — OFFICE VISIT (OUTPATIENT)
Dept: FAMILY MEDICINE CLINIC | Age: 28
End: 2018-04-23

## 2018-04-23 VITALS
DIASTOLIC BLOOD PRESSURE: 84 MMHG | BODY MASS INDEX: 40.02 KG/M2 | RESPIRATION RATE: 16 BRPM | TEMPERATURE: 98.6 F | SYSTOLIC BLOOD PRESSURE: 179 MMHG | HEART RATE: 96 BPM | OXYGEN SATURATION: 99 % | WEIGHT: 255 LBS | HEIGHT: 67 IN

## 2018-04-23 DIAGNOSIS — I10 ESSENTIAL HYPERTENSION: ICD-10-CM

## 2018-04-23 DIAGNOSIS — R30.0 DYSURIA: Primary | ICD-10-CM

## 2018-04-23 PROBLEM — E66.01 SEVERE OBESITY (BMI 35.0-39.9) WITH COMORBIDITY (HCC): Status: ACTIVE | Noted: 2018-04-23

## 2018-04-23 LAB
BILIRUB UR QL STRIP: NEGATIVE
GLUCOSE UR-MCNC: NORMAL MG/DL
KETONES P FAST UR STRIP-MCNC: NEGATIVE MG/DL
PH UR STRIP: 5.5 [PH] (ref 4.6–8)
PROT UR QL STRIP: NORMAL
SP GR UR STRIP: 1.01 (ref 1–1.03)
UA UROBILINOGEN AMB POC: NORMAL (ref 0.2–1)
URINALYSIS CLARITY POC: CLEAR
URINALYSIS COLOR POC: YELLOW
URINE BLOOD POC: NORMAL
URINE LEUKOCYTES POC: NORMAL
URINE NITRITES POC: POSITIVE

## 2018-04-23 RX ORDER — FLUCONAZOLE 150 MG/1
150 TABLET ORAL DAILY
Qty: 2 TAB | Refills: 0 | Status: SHIPPED | OUTPATIENT
Start: 2018-04-23 | End: 2018-04-24

## 2018-04-23 RX ORDER — CIPROFLOXACIN 500 MG/1
500 TABLET ORAL 2 TIMES DAILY
Qty: 10 TAB | Refills: 0 | Status: SHIPPED | OUTPATIENT
Start: 2018-04-23 | End: 2018-04-28

## 2018-04-23 RX ORDER — PHENAZOPYRIDINE HYDROCHLORIDE 100 MG/1
100 TABLET, FILM COATED ORAL
Qty: 9 TAB | Refills: 0 | Status: SHIPPED | OUTPATIENT
Start: 2018-04-23 | End: 2018-04-26

## 2018-04-23 NOTE — PROGRESS NOTES
1. Have you been to the ER, urgent care clinic since your last visit? Hospitalized since your last visit? No    2. Have you seen or consulted any other health care providers outside of the [facility] since your last visit? Include any pap smears or colon screening.  No     Chief Complaint   Patient presents with    Bladder Infection     x1 day

## 2018-04-23 NOTE — MR AVS SNAPSHOT
[address] [address] 
[phone] Patient: Dick Mccormack MRN: [id number] SRE:7/51/2629 Visit Information Date & Time Provider Department Dept. Phone Encounter #  
 4/23/2018  3:15 PM Armida Monae NP [facility] [phone] [id number] Upcoming Health Maintenance Date Due  
 PAP AKA CERVICAL CYTOLOGY 1/5/2021 DTaP/Tdap/Td series (2 - Td) 11/9/2021 Allergies as of 4/23/2018  Review Complete On: 4/23/2018 By: Nita Gaines LPN Severity Noted Reaction Type Reactions Lisinopril  10/22/2015    Swelling Lips swell Current Immunizations  Reviewed on 1/16/2018 Name Date Influenza Vaccine 1/1/2018, 9/5/2013 Influenza Vaccine Split 11/9/2011, 11/5/2010 TB Skin Test (PPD) Intradermal 6/2/2016 TDAP Vaccine 11/9/2011 Not reviewed this visit You Were Diagnosed With   
  
 Codes Comments Dysuria    -  Primary ICD-10-CM: R30.0 ICD-9-CM: 788.1 Essential hypertension     ICD-10-CM: I10 
ICD-9-CM: 401.9 Vitals BP Pulse Temp Resp Height(growth percentile) Weight(growth percentile) 179/84 96 98.6 °F (37 °C) (Oral) 16 5' 7\" (1.702 m) 255 lb (115.7 kg) SpO2 BMI OB Status Smoking Status 99% 39.94 kg/m2 Unknown Never Smoker Vitals History BMI and BSA Data Body Mass Index Body Surface Area  
 39.94 kg/m 2 2.34 m 2 Preferred Pharmacy Pharmacy Name Phone [address] Mobile [phone] Your Updated Medication List  
  
   
This list is accurate as of 4/23/18  3:32 PM.  Always use your most recent med list.  
  
  
  
  
 ciprofloxacin HCl 500 mg tablet Commonly known as:  CIPRO Take 1 Tab by mouth two (2) times a day for 5 days. fluconazole 150 mg tablet Commonly known as:  DIFLUCAN Take 1 Tab by mouth daily for 1 day. May repeat in 3 days if needed hydroCHLOROthiazide 25 mg tablet Commonly known as:  HYDRODIURIL Take 1 Tab by mouth daily. ibuprofen 800 mg tablet Commonly known as:  MOTRIN Take 1 Tab by mouth as needed for Pain.  
  
 phenazopyridine 100 mg tablet Commonly known as:  PYRIDIUM Take 1 Tab by mouth three (3) times daily as needed for Pain for up to 3 days. simvastatin 10 mg tablet Commonly known as:  ZOCOR Take 1 Tab by mouth nightly. traMADol 50 mg tablet Commonly known as:  ULTRAM  
Take 1 Tab by mouth every six (6) hours as needed for Pain. Max Daily Amount: 200 mg. Prescriptions Sent to Pharmacy Refills  
 ciprofloxacin HCl (CIPRO) 500 mg tablet 0 Sig: Take 1 Tab by mouth two (2) times a day for 5 days. Class: Normal  
 Pharmacy: [facility] [address] Ph #: [phone] Route: Oral  
 phenazopyridine (PYRIDIUM) 100 mg tablet 0 Sig: Take 1 Tab by mouth three (3) times daily as needed for Pain for up to 3 days. Class: Normal  
 Pharmacy: [facility] [address] Ph #: [phone] Route: Oral  
 fluconazole (DIFLUCAN) 150 mg tablet 0 Sig: Take 1 Tab by mouth daily for 1 day. May repeat in 3 days if needed Class: Normal  
 Pharmacy: [facility] [address] Ph #: [phone] Route: Oral  
  
We Performed the Following AMB POC URINALYSIS DIP STICK AUTO W/O MICRO [48616 CPT(R)] CULTURE, URINE [id number] CPT(R)] Patient Instructions Urinary Tract Infection in Women: Care Instructions Your Care Instructions A urinary tract infection, or UTI, is a general term for an infection anywhere between the kidneys and the urethra (where urine comes out). Most UTIs are bladder infections. They often cause pain or burning when you urinate. UTIs are caused by bacteria and can be cured with antibiotics.  Be sure to complete your treatment so that the infection goes away. Follow-up care is a key part of your treatment and safety. Be sure to make and go to all appointments, and call your doctor if you are having problems. It's also a good idea to know your test results and keep a list of the medicines you take. How can you care for yourself at home? · Take your antibiotics as directed. Do not stop taking them just because you feel better. You need to take the full course of antibiotics. · Drink extra water and other fluids for the next day or two. This may help wash out the bacteria that are causing the infection. (If you have kidney, heart, or liver disease and have to limit fluids, talk with your doctor before you increase your fluid intake.) · Avoid drinks that are carbonated or have caffeine. They can irritate the bladder. · Urinate often. Try to empty your bladder each time. · To relieve pain, take a hot bath or lay a heating pad set on low over your lower belly or genital area. Never go to sleep with a heating pad in place. To prevent UTIs · Drink plenty of water each day. This helps you urinate often, which clears bacteria from your system. (If you have kidney, heart, or liver disease and have to limit fluids, talk with your doctor before you increase your fluid intake.) · Urinate when you need to. · Urinate right after you have sex. · Change sanitary pads often. · Avoid douches, bubble baths, feminine hygiene sprays, and other feminine hygiene products that have deodorants. · After going to the bathroom, wipe from front to back. When should you call for help? Call your doctor now or seek immediate medical care if: 
? · Symptoms such as fever, chills, nausea, or vomiting get worse or appear for the first time. ? · You have new pain in your back just below your rib cage. This is called flank pain. ? · There is new blood or pus in your urine. ? · You have any problems with your antibiotic medicine. ?Watch closely for changes in your health, and be sure to contact your doctor if: 
? · You are not getting better after taking an antibiotic for 2 days. ? · Your symptoms go away but then come back. Where can you learn more? Go to http://isabel-sayra.info/. Enter X477 in the search box to learn more about \"Urinary Tract Infection in Women: Care Instructions. \" Current as of: May 12, 2017 Content Version: 11.4 © 8278-6730 [employer]. Care instructions adapted under license by [employer] (which disclaims liability or warranty for this information). If you have questions about a medical condition or this instruction, always ask your healthcare professional. [address] any warranty or liability for your use of this information. Introducing [facility]! Dear Herbert: 
Thank you for requesting a Fleck account. Our records indicate that you already have an active Fleck account. You can access your account anytime at [email or web address] Did you know that you can access your hospital and ER discharge instructions at any time in Fleck? You can also review all of your test results from your hospital stay or ER visit. Additional Information If you have questions, please visit the Frequently Asked Questions section of the Fleck website at [email or web address] Remember, Fleck is NOT to be used for urgent needs. For medical emergencies, dial 911. Now available from your iPhone and Android! Please provide this summary of care documentation to your next provider. Your primary care clinician is listed as Lavonne Chacko. If you have any questions after today's visit, please call [phone].

## 2018-04-23 NOTE — PROGRESS NOTES
Chief Complaint   Patient presents with    Bladder Infection     x1 day     she is a 33y.o. year old female who presents for evalution. Started feeling dysuria and frequency last night. Has been continuing since then, very painful to urinate. No low back pain, fever, or chills. Has been taking Azo. Reviewed PmHx, RxHx, FmHx, SocHx, AllgHx and updated and dated in the chart. Review of Systems - negative except as listed above in the HPI    Objective:     Vitals:    04/23/18 1524   BP: 179/84   Pulse: 96   Resp: 16   Temp: 98.6 °F (37 °C)   TempSrc: Oral   SpO2: 99%   Weight: 255 lb (115.7 kg)   Height: 5' 7\" (1.702 m)     Physical Examination: General appearance - alert, well appearing, and in no distress  Chest - clear to auscultation, no wheezes, rales or rhonchi, symmetric air entry  Heart - normal rate, regular rhythm, normal S1, S2, no murmurs, rubs, clicks or gallops  Abdomen - soft, nontender, nondistended, no masses or organomegaly  no CVA tenderness    Assessment/ Plan:   Diagnoses and all orders for this visit:    1. Dysuria  -     AMB POC URINALYSIS DIP STICK AUTO W/O MICRO  -     CULTURE, URINE  -     ciprofloxacin HCl (CIPRO) 500 mg tablet; Take 1 Tab by mouth two (2) times a day for 5 days. -     phenazopyridine (PYRIDIUM) 100 mg tablet; Take 1 Tab by mouth three (3) times daily as needed for Pain for up to 3 days. -     fluconazole (DIFLUCAN) 150 mg tablet; Take 1 Tab by mouth daily for 1 day. May repeat in 3 days if needed  New rx. Pt instructed to take full course of antibiotics and increase water consumption. F/U if no improvement or develops fever/chills/nausea/vomiting. 2. Essential hypertension   Did not take medication today. Encouraged pt to monitor BP at home, preferably in AM when first wakes up. Goal BP is < 130/90 if on meds. Discussed consequences of uncontrolled HTN and need for yearly eye exam. Recommended pt limit salt intake and engage in regular exercise. Continue current medications. F/U 3 mo or sooner if needed    Pt voiced understanding regarding plan of care. Follow-up Disposition:  Return if symptoms worsen or fail to improve. I have discussed the diagnosis with the patient and the intended plan as seen in the above orders. The patient has received an after-visit summary and questions were answered concerning future plans.      Medication Side Effects and Warnings were discussed with patient    Zhang Chaves NP

## 2018-04-23 NOTE — PATIENT INSTRUCTIONS

## 2018-04-25 LAB
BACTERIA UR CULT: ABNORMAL
BACTERIA UR CULT: ABNORMAL

## 2018-07-11 RX ORDER — METRONIDAZOLE 500 MG/1
500 TABLET ORAL 3 TIMES DAILY
Qty: 21 TAB | Refills: 0 | Status: SHIPPED | OUTPATIENT
Start: 2018-07-11 | End: 2018-07-18

## 2018-07-17 RX ORDER — SULFACETAMIDE SODIUM 100 MG/ML
2 SOLUTION/ DROPS OPHTHALMIC 4 TIMES DAILY
Qty: 10 ML | Refills: 1 | Status: SHIPPED | OUTPATIENT
Start: 2018-07-17 | End: 2018-07-27

## 2018-10-03 ENCOUNTER — LAB ONLY (OUTPATIENT)
Dept: FAMILY MEDICINE CLINIC | Age: 28
End: 2018-10-03

## 2018-10-03 DIAGNOSIS — R30.9 URINARY PAIN: Primary | ICD-10-CM

## 2018-10-03 LAB
BACTERIA UA POCT, BACTPOCT: NORMAL
BILIRUB UR QL STRIP: NEGATIVE
CASTS UA POCT: NORMAL
CLUE CELLS, CLUEPOCT: NORMAL
CRYSTALS UA POCT, CRYSPOCT: NORMAL
EPITHELIAL CELLS POCT: NORMAL
GLUCOSE UR-MCNC: NEGATIVE MG/DL
KETONES P FAST UR STRIP-MCNC: NEGATIVE MG/DL
MUCUS UA POCT, MUCPOCT: NORMAL
PH UR STRIP: 7 [PH] (ref 4.6–8)
PROT UR QL STRIP: NORMAL
RBC UA POCT, RBCPOCT: NORMAL
SP GR UR STRIP: 1.01 (ref 1–1.03)
TRICH UA POCT, TRICHPOC: NORMAL
UA UROBILINOGEN AMB POC: NORMAL (ref 0.2–1)
URINALYSIS CLARITY POC: NORMAL
URINALYSIS COLOR POC: YELLOW
URINE BLOOD POC: NORMAL
URINE CULT COMMENT, POCT: NORMAL
URINE LEUKOCYTES POC: NORMAL
URINE NITRITES POC: NEGATIVE
WBC UA POCT, WBCPOCT: NORMAL
YEAST UA POCT, YEASTPOC: NORMAL

## 2018-10-03 RX ORDER — FLUCONAZOLE 150 MG/1
150 TABLET ORAL DAILY
Qty: 1 TAB | Refills: 0 | Status: SHIPPED | OUTPATIENT
Start: 2018-10-03 | End: 2018-10-04

## 2018-10-03 RX ORDER — CIPROFLOXACIN 500 MG/1
500 TABLET ORAL 2 TIMES DAILY
Qty: 10 TAB | Refills: 0 | Status: SHIPPED | OUTPATIENT
Start: 2018-10-03 | End: 2018-10-08

## 2018-10-06 LAB
BACTERIA UR CULT: ABNORMAL
BACTERIA UR CULT: ABNORMAL

## 2018-11-30 DIAGNOSIS — R31.9 URINARY TRACT INFECTION WITH HEMATURIA, SITE UNSPECIFIED: Primary | ICD-10-CM

## 2018-11-30 DIAGNOSIS — N39.0 URINARY TRACT INFECTION WITH HEMATURIA, SITE UNSPECIFIED: Primary | ICD-10-CM

## 2018-11-30 RX ORDER — CIPROFLOXACIN 500 MG/1
500 TABLET ORAL 2 TIMES DAILY
Qty: 10 TAB | Refills: 0 | Status: SHIPPED | OUTPATIENT
Start: 2018-11-30 | End: 2018-12-05

## 2018-11-30 RX ORDER — FLUCONAZOLE 150 MG/1
150 TABLET ORAL DAILY
Qty: 1 TAB | Refills: 0 | Status: SHIPPED | OUTPATIENT
Start: 2018-11-30 | End: 2018-12-01

## 2018-11-30 NOTE — PROGRESS NOTES
UA performed in office. Sp gravity 1.025  Trace blood  Negative nitrites  3+ leukocytes      ICD-10-CM ICD-9-CM    1. Urinary tract infection with hematuria, site unspecified N39.0 599.0 ciprofloxacin HCl (CIPRO) 500 mg tablet    R31.9 599.70 fluconazole (DIFLUCAN) 150 mg tablet     Orders Placed This Encounter    ciprofloxacin HCl (CIPRO) 500 mg tablet     Sig: Take 1 Tab by mouth two (2) times a day for 5 days. Dispense:  10 Tab     Refill:  0    fluconazole (DIFLUCAN) 150 mg tablet     Sig: Take 1 Tab by mouth daily for 1 day. FDA advises cautious prescribing of oral fluconazole in pregnancy.      Dispense:  1 Tab     Refill:  0

## 2019-01-02 RX ORDER — CIPROFLOXACIN 500 MG/1
500 TABLET ORAL 2 TIMES DAILY
Qty: 20 TAB | Refills: 0 | Status: SHIPPED | OUTPATIENT
Start: 2019-01-02 | End: 2019-01-12

## 2019-02-27 ENCOUNTER — CLINICAL SUPPORT (OUTPATIENT)
Dept: FAMILY MEDICINE CLINIC | Age: 29
End: 2019-02-27

## 2019-02-27 VITALS — DIASTOLIC BLOOD PRESSURE: 85 MMHG | SYSTOLIC BLOOD PRESSURE: 138 MMHG | BODY MASS INDEX: 38.84 KG/M2 | WEIGHT: 248 LBS

## 2019-02-27 DIAGNOSIS — E66.01 CLASS 2 SEVERE OBESITY DUE TO EXCESS CALORIES WITH SERIOUS COMORBIDITY IN ADULT, UNSPECIFIED BMI (HCC): Primary | ICD-10-CM

## 2019-02-27 DIAGNOSIS — E66.09 OBESITY DUE TO EXCESS CALORIES WITH SERIOUS COMORBIDITY, UNSPECIFIED CLASSIFICATION: Primary | ICD-10-CM

## 2019-02-27 RX ORDER — PHENTERMINE HYDROCHLORIDE 37.5 MG/1
37.5 TABLET ORAL
Qty: 30 TAB | Refills: 3 | Status: SHIPPED | OUTPATIENT
Start: 2019-02-27 | End: 2019-07-16 | Stop reason: ALTCHOICE

## 2019-07-16 ENCOUNTER — OFFICE VISIT (OUTPATIENT)
Dept: FAMILY MEDICINE CLINIC | Age: 29
End: 2019-07-16

## 2019-07-16 VITALS
TEMPERATURE: 98.6 F | HEART RATE: 80 BPM | BODY MASS INDEX: 38.92 KG/M2 | DIASTOLIC BLOOD PRESSURE: 94 MMHG | HEIGHT: 67 IN | SYSTOLIC BLOOD PRESSURE: 148 MMHG | OXYGEN SATURATION: 98 % | RESPIRATION RATE: 20 BRPM | WEIGHT: 248 LBS

## 2019-07-16 DIAGNOSIS — E55.9 VITAMIN D DEFICIENCY: ICD-10-CM

## 2019-07-16 DIAGNOSIS — I10 ESSENTIAL HYPERTENSION: Primary | ICD-10-CM

## 2019-07-16 DIAGNOSIS — E78.2 MIXED HYPERLIPIDEMIA: ICD-10-CM

## 2019-07-16 RX ORDER — CHLORTHALIDONE 25 MG/1
25 TABLET ORAL DAILY
Qty: 30 TAB | Refills: 2 | Status: SHIPPED | OUTPATIENT
Start: 2019-07-16 | End: 2019-10-30 | Stop reason: SDUPTHER

## 2019-07-16 RX ORDER — AMLODIPINE BESYLATE 5 MG/1
5 TABLET ORAL DAILY
Qty: 30 TAB | Refills: 3 | Status: SHIPPED | OUTPATIENT
Start: 2019-07-16 | End: 2020-02-18 | Stop reason: SDUPTHER

## 2019-07-16 NOTE — PROGRESS NOTES
1. Have you been to the ER, urgent care clinic since your last visit? Hospitalized since your last visit? No    2. Have you seen or consulted any other health care providers outside of the [address] [place] [address] Fel[place] since your last visit? Include any pap smears or colon screening. [facility] for G[facility].     Chief Complaint   Patient presents with   [address]     PCP

## 2019-07-16 NOTE — PROGRESS NOTES
Chief Complaint   Patient presents with   [address]     PCP     she is a 26y.o. year old female who presents for evalution. Here to establish care, no complaints  She has HTN, she got facial swelling as a result of lisinopril. She tried hctz but that did not work for her  She has started walking her neighborhood 3 days a week  She eats out most lunches      Reviewed PmHx, RxHx, FmHx, SocHx, AllgHx and updated and dated in the chart. Aspirin yes ____   No____ N/A____    Patient Active Problem List    Diagnosis    Severe obesity (BMI 35.0-39. 9) with comorbidity ([address]    Obesity, Class II, BMI 35-39.9, with comorbidity    Secondary dysmenorrhea    Menorrhagia with regular cycle    Low grade squamous intraepith lesion on cytologic smear cervix (lgsil)    Hypertension    Vitamin D deficiency     Suspected adenomyosis uteri      Hyperlipidemia       Nurse notes were reviewed and copied and are correct  Review of Systems - negative except as listed above in the HPI    Objective:     Vitals:    07/16/19 0936   BP: (!) 148/94   Pulse: 80   Resp: 20   Temp: 98.6 °F (37 °C)   TempSrc: Oral   SpO2: 98%   Weight: 248 lb (112.5 kg)   Height: 5' 7\" (1.702 m)       Physical Examination: General appearance - alert, well appearing, and in no distress  Mental status - alert, oriented to person, place, and time  Neck - supple, no significant adenopathy  Chest - clear to auscultation, no wheezes, rales or rhonchi, symmetric air entry  Heart - normal rate, regular rhythm, normal S1, S2, no murmurs, rubs, clicks or gallops  Extremities - peripheral pulses normal, no pedal edema, no clubbing or cyanosis  Skin - normal coloration and turgor, no rashes, no suspicious skin lesions noted      Assessment/ Plan:   Diagnoses and all orders for this visit:    1. Essential hypertension  -     amLODIPine (NORVASC) 5 mg tablet; Take 1 Tab by mouth daily. -     chlorthalidone (HYGROTEN) 25 mg tablet;  Take 1 Tab by mouth daily.  Trying new meds  rechck in 2 weeks  2. Vitamin D deficiency  -     VITAMIN D, 25 HYDROXY    3. Mixed hyperlipidemia  -     LIPID PANEL       Follow-up and Dispositions    · Return in about 2 weeks (around 7/30/2019). ICD-10-CM ICD-9-CM    1. Essential hypertension I10 401.9 amLODIPine (NORVASC) 5 mg tablet      chlorthalidone (HYGROTEN) 25 mg tablet   2. Vitamin D deficiency E55.9 268.9 VITAMIN D, 25 HYDROXY   3. Mixed hyperlipidemia E78.2 272.2 LIPID PANEL       I have discussed the diagnosis with the patient and the intended plan as seen in the above orders. The patient has received an after-visit summary and questions were answered concerning future plans. Medication Side Effects and Warnings were discussed with patient: yes  Patient Labs were reviewed and or requested: yes  Patient Past Records were reviewed and or requested: yes        There are no Patient Instructions on file for this visit.     The patient verbalizes understanding and agrees with the plan of care        Patient has the advanced directives booklet to review

## 2019-07-17 LAB
25(OH)D3+25(OH)D2 SERPL-MCNC: 8.6 NG/ML (ref 30–100)
CHOLEST SERPL-MCNC: 218 MG/DL (ref 100–199)
HDLC SERPL-MCNC: 57 MG/DL
INTERPRETATION, 910389: NORMAL
LDLC SERPL CALC-MCNC: 132 MG/DL (ref 0–99)
TRIGL SERPL-MCNC: 145 MG/DL (ref 0–149)
VLDLC SERPL CALC-MCNC: 29 MG/DL (ref 5–40)

## 2019-07-23 DIAGNOSIS — E55.9 VITAMIN D DEFICIENCY: Primary | ICD-10-CM

## 2019-07-23 RX ORDER — ERGOCALCIFEROL 1.25 MG/1
50000 CAPSULE ORAL
Qty: 24 CAP | Refills: 0 | Status: SHIPPED | OUTPATIENT
Start: 2019-07-23 | End: 2020-07-27

## 2019-07-23 NOTE — PROGRESS NOTES
The vit D is very low. I want her to start taking a supplement- is sent to pharmacy  The LDL( bad) cholesterol needs improvement. Exercise is very important - exercise at least 150 mins a week. Also avoid fast food and junk foods.  We will discuss more on your next visit 7/31

## 2019-07-30 ENCOUNTER — OFFICE VISIT (OUTPATIENT)
Dept: FAMILY MEDICINE CLINIC | Age: 29
End: 2019-07-30

## 2019-07-30 VITALS
SYSTOLIC BLOOD PRESSURE: 131 MMHG | TEMPERATURE: 98.1 F | BODY MASS INDEX: 38.2 KG/M2 | WEIGHT: 243.4 LBS | OXYGEN SATURATION: 96 % | RESPIRATION RATE: 20 BRPM | HEART RATE: 88 BPM | HEIGHT: 67 IN | DIASTOLIC BLOOD PRESSURE: 84 MMHG

## 2019-07-30 DIAGNOSIS — E55.9 VITAMIN D DEFICIENCY: ICD-10-CM

## 2019-07-30 DIAGNOSIS — E78.2 MIXED HYPERLIPIDEMIA: ICD-10-CM

## 2019-07-30 DIAGNOSIS — I10 ESSENTIAL HYPERTENSION: Primary | ICD-10-CM

## 2019-07-30 NOTE — PROGRESS NOTES
Chief Complaint   Patient presents with    Weight Management     she is a 26y.o. year old female who presents for evalution. Started BP meds 2 weeks ago  She has been eating not fried healthy mealls  Has not eaten out  She is exercising 4 days a week  Reviewed PmHx, RxHx, FmHx, SocHx, AllgHx and updated and dated in the chart. Aspirin yes ____   No____ N/A____    Patient Active Problem List    Diagnosis    Severe obesity (BMI 35.0-39. 9) with comorbidity ([address]    Obesity, Class II, BMI 35-39.9, with comorbidity    Secondary dysmenorrhea    Menorrhagia with regular cycle    Low grade squamous intraepith lesion on cytologic smear cervix (lgsil)    Hypertension    Vitamin D deficiency     Suspected adenomyosis uteri      Hyperlipidemia       Nurse notes were reviewed and copied and are correct  Review of Systems - negative except as listed above in the HPI    Objective:     Vitals:    07/30/19 1557   BP: 131/84   Pulse: 88   Resp: 20   Temp: 98.1 °F (36.7 °C)   TempSrc: Oral   SpO2: 96%   Weight: 243 lb 6.4 oz (110.4 kg)   Height: 5' 7\" (1.702 m)       Physical Examination: General appearance - alert, well appearing, and in no distress  Mental status - alert, oriented to person, place, and time  Chest - clear to auscultation, no wheezes, rales or rhonchi, symmetric air entry  Heart - normal rate, regular rhythm, normal S1, S2, no murmurs, rubs, clicks or gallops  Musculoskeletal - no joint tenderness, deformity or swelling  Extremities - peripheral pulses normal, no pedal edema, no clubbing or cyanosis      Assessment/ Plan:   Diagnoses and all orders for this visit:    1. Essential hypertension    2. Vitamin D deficiency    3. Mixed hyperlipidemia       Follow-up and Dispositions    · Return in about 3 months (around 10/30/2019). ICD-10-CM ICD-9-CM    1. Essential hypertension I10 401.9    2. Vitamin D deficiency E55.9 268.9    3.  Mixed hyperlipidemia E78.2 272.2        I have discussed the diagnosis with the patient and the intended plan as seen in the above orders. The patient has received an after-visit summary and questions were answered concerning future plans. Medication Side Effects and Warnings were discussed with patient: yes  Patient Labs were reviewed and or requested: yes  Patient Past Records were reviewed and or requested: yes        There are no Patient Instructions on file for this visit.     The patient verbalizes understanding and agrees with the plan of care        Patient has the advanced directives booklet to review

## 2019-07-30 NOTE — PROGRESS NOTES
1. Have you been to the ER, urgent care clinic since your last visit? Hospitalized since your last visit? No    2. Have you seen or consulted any other health care providers outside of the [address] [place] [address] since your last visit? Include any pap smears or colon screening.  No   Chief Complaint   Patient presents with    Weight Management     Body Weight: 243.4  Body Fat%: 41.6  Muscle Mass Weight: 37.9  Body Water Weight: 96.2  Basal Metabolic Rate: 8855  BMI: 38.12

## 2020-02-14 ENCOUNTER — DOCUMENTATION ONLY (OUTPATIENT)
Dept: FAMILY MEDICINE CLINIC | Age: 30
End: 2020-02-14

## 2020-02-18 ENCOUNTER — OFFICE VISIT (OUTPATIENT)
Dept: FAMILY MEDICINE CLINIC | Age: 30
End: 2020-02-18

## 2020-02-18 VITALS
HEART RATE: 76 BPM | WEIGHT: 249 LBS | HEIGHT: 67 IN | DIASTOLIC BLOOD PRESSURE: 91 MMHG | OXYGEN SATURATION: 99 % | SYSTOLIC BLOOD PRESSURE: 131 MMHG | RESPIRATION RATE: 16 BRPM | BODY MASS INDEX: 39.08 KG/M2 | TEMPERATURE: 98.7 F

## 2020-02-18 DIAGNOSIS — I10 ESSENTIAL HYPERTENSION: ICD-10-CM

## 2020-02-18 DIAGNOSIS — E78.2 MIXED HYPERLIPIDEMIA: ICD-10-CM

## 2020-02-18 DIAGNOSIS — Z13.1 SCREENING FOR DIABETES MELLITUS: ICD-10-CM

## 2020-02-18 DIAGNOSIS — E55.9 VITAMIN D DEFICIENCY: Primary | ICD-10-CM

## 2020-02-18 DIAGNOSIS — B35.4 TINEA CORPORIS: ICD-10-CM

## 2020-02-18 RX ORDER — CHLORTHALIDONE 25 MG/1
TABLET ORAL
Qty: 30 TAB | Refills: 0 | Status: SHIPPED | OUTPATIENT
Start: 2020-02-18 | End: 2020-04-08

## 2020-02-18 RX ORDER — CHLORPHENIRAMINE MALEATE 4 MG
TABLET ORAL 2 TIMES DAILY
Qty: 15 G | Refills: 0 | Status: ON HOLD | OUTPATIENT
Start: 2020-02-18 | End: 2021-05-27

## 2020-02-18 RX ORDER — AMLODIPINE BESYLATE 5 MG/1
5 TABLET ORAL DAILY
Qty: 30 TAB | Refills: 3 | Status: ON HOLD | OUTPATIENT
Start: 2020-02-18 | End: 2021-05-27

## 2020-02-18 NOTE — PROGRESS NOTES
1. Have you been to the ER, urgent care clinic since your last visit? Hospitalized since your last visit? No    2. Have you seen or consulted any other health care providers outside of the [address] [place] [address] since your last visit? Include any pap smears or colon screening.  No     Chief Complaint   Patient presents with    Medication Refill         Visit Vitals  BP (!) 158/105 (BP 1 Location: Left arm, BP Patient Position: Sitting)   Pulse 76   Temp 98.7 °F (37.1 °C) (Oral)   Resp 16   Ht 5' 7\" (1.702 m)   Wt 249 lb (112.9 kg)   SpO2 99%   BMI 39.00 kg/m²

## 2020-02-18 NOTE — PROGRESS NOTES
Chief Complaint   Patient presents with    Medication Refill     she is a 26y.o. year old female who presents for evalution. Her last dose of meds was 3 weeks ago  She takes meds for HTN and high cholesterol    Also prescribed phentermine from another doc. She says she is not taking it  I counseled her on the importance of bp control when taking phentermine. Her BP is too high to take it    She also c/o a rash between and under breasts. She has been trying an antifungal powder that is not reslving it    Reviewed PmHx, RxHx, FmHx, SocHx, AllgHx and updated and dated in the chart. Aspirin yes ____   No____ N/A____    Patient Active Problem List    Diagnosis    Severe obesity (BMI 35.0-39. 9) with comorbidity ([address]    Obesity, Class II, BMI 35-39.9, with comorbidity    Secondary dysmenorrhea    Menorrhagia with regular cycle    Low grade squamous intraepith lesion on cytologic smear cervix (lgsil)    Hypertension    Vitamin D deficiency     Suspected adenomyosis uteri      Hyperlipidemia       Nurse notes were reviewed and copied and are correct  Review of Systems - negative except as listed above in the HPI    Objective:     Vitals:    02/18/20 1300 02/18/20 1319   BP: (!) 158/105 (!) 131/91   Pulse: 76    Resp: 16    Temp: 98.7 °F (37.1 °C)    TempSrc: Oral    SpO2: 99%    Weight: 249 lb (112.9 kg)    Height: 5' 7\" (1.702 m)      Physical Examination: General appearance - alert, well appearing, and in no distress  Mental status - alert, oriented to person, place, and time  Neck - supple, no significant adenopathy  Chest - clear to auscultation, no wheezes, rales or rhonchi, symmetric air entry  Heart - normal rate, regular rhythm, normal S1, S2, no murmurs, rubs, clicks or gallops  Abdomen - soft, nontender, nondistended, no masses or organomegaly  Neurological - alert, oriented, normal speech, no focal findings or movement disorder noted  Musculoskeletal - no joint tenderness, deformity or swelling  Extremities - peripheral pulses normal, no pedal edema, no clubbing or cyanosis  Skin - red rash under and between breats         Assessment/ Plan:   Diagnoses and all orders for this visit:    1. Vitamin D deficiency  -     VITAMIN D, 25 HYDROXY; Future    2. Essential hypertension  -     amLODIPine (NORVASC) 5 mg tablet; Take 1 Tab by mouth daily. -     chlorthalidone (HYGROTEN) 25 mg tablet; TAKE 1 TABLET BY MOUTH ONCE DAILY  -     METABOLIC PANEL, COMPREHENSIVE; Future  No phentermine   3. Mixed hyperlipidemia  -     LIPID PANEL; Future  Monitor and treat as indicated  4. Tinea corporis  -     clotrimazole (LOTRIMIN) 1 % topical cream; Apply  to affected area two (2) times a day. -     HEMOGLOBIN A1C WITH EAG; Future  Try the antifungal in cream  Try to keep the area dry and keep cool  5. Screening for diabetes mellitus  -     HEMOGLOBIN A1C WITH EAG; Future       Follow-up and Dispositions    · Return in about 3 months (around 5/18/2020). ICD-10-CM ICD-9-CM    1. Vitamin D deficiency E55.9 268.9 VITAMIN D, 25 HYDROXY   2. Essential hypertension I10 401.9 amLODIPine (NORVASC) 5 mg tablet      chlorthalidone (HYGROTEN) 25 mg tablet      METABOLIC PANEL, COMPREHENSIVE   3. Mixed hyperlipidemia E78.2 272.2 LIPID PANEL   4. Tinea corporis B35.4 110.5 clotrimazole (LOTRIMIN) 1 % topical cream      HEMOGLOBIN A1C WITH EAG   5. Screening for diabetes mellitus Z13.1 V77.1 HEMOGLOBIN A1C WITH EAG       I have discussed the diagnosis with the patient and the intended plan as seen in the above orders. The patient has received an after-visit summary and questions were answered concerning future plans. Medication Side Effects and Warnings were discussed with patient: yes  Patient Labs were reviewed and or requested: yes  Patient Past Records were reviewed and or requested: yes        There are no Patient Instructions on file for this visit.     The patient verbalizes understanding and agrees with the plan of care        Patient has the advanced directives booklet to review

## 2020-05-05 LAB — SARS-COV-2, NAA: NOT DETECTED

## 2020-05-22 ENCOUNTER — TELEPHONE (OUTPATIENT)
Dept: FAMILY MEDICINE CLINIC | Age: 30
End: 2020-05-22

## 2020-05-22 NOTE — TELEPHONE ENCOUNTER
Left message on vm to call office back to schedule appointment for form completion. Please schedule appointment.

## 2020-06-30 ENCOUNTER — VIRTUAL VISIT (OUTPATIENT)
Dept: FAMILY MEDICINE CLINIC | Age: 30
End: 2020-06-30

## 2020-06-30 DIAGNOSIS — E66.09 OBESITY DUE TO EXCESS CALORIES WITH SERIOUS COMORBIDITY, UNSPECIFIED CLASSIFICATION: Primary | ICD-10-CM

## 2020-06-30 NOTE — PROGRESS NOTES
Diana Draper is a 31 y.o. female who was seen by synchronous (real-time) audio-video technology on 6/30/2020 for No chief complaint on file. She is here to get form filled out for bariatric surgery   a doctor gave her phentermine last year  She is seeing the surgeons dietitian right now  Her insurance only requires 4 dietitian visits ad a psychological evaluation      She exercises 3 days a week      Assessment & Plan:   Diagnoses and all orders for this visit:    1. Obesity due to excess calories with serious comorbidity, unspecified classification    I completed the form from [facility] to support her in the effort to get weight loss surgery  She has tried weight loss with anothr doctor using phentermine but diet or behavioral support        Subjective:         Patient Active Problem List    Diagnosis Date Noted    Severe obesity (BMI 35.0-39. 9) with comorbidity ([facility] [address] 04/23/2018    Obesity, Class II, BMI 35-39.9, with comorbidity 09/25/2013    Secondary dysmenorrhea 07/02/2013    Menorrhagia with regular cycle 07/02/2013    Low grade squamous intraepith lesion on cytologic smear cervix (lgsil) 12/04/2012    Hypertension 01/28/2011    Vitamin D deficiency 11/12/2010    Hyperlipidemia 11/05/2010     Current Outpatient Medications   Medication Sig Dispense Refill    chlorthalidone (HYGROTEN) 25 mg tablet Take 1 tablet by mouth once daily 90 Tab 1    amLODIPine (NORVASC) 5 mg tablet Take 1 Tab by mouth daily. 30 Tab 3    clotrimazole (LOTRIMIN) 1 % topical cream Apply  to affected area two (2) times a day. 15 g 0    phentermine (ADIPEX-P) 37.5 mg tablet TAKE 1 TABLET BY MOUTH IN THE MORNING. **MAX DAILY AMOUNT 37.5MG 30 Tab 2    ergocalciferol (ERGOCALCIFEROL) 50,000 unit capsule Take 1 Cap by mouth every seven (7) days. 24 Cap 0    traMADol (ULTRAM) 50 mg tablet Take 1 Tab by mouth every six (6) hours as needed for Pain.  Max Daily Amount: 200 mg. 10 Tab 0    ibuprofen (MOTRIN) 800 mg tablet Take 1 Tab by mouth as needed for Pain. 90 Tab 3    simvastatin (ZOCOR) 10 mg tablet Take 1 Tab by mouth nightly. (Patient taking differently: Take 10 mg by mouth nightly. Patient reports stop taking) 60 Tab 0       ROS    Objective:   No flowsheet data found. [INSTRUCTIONS:  \"[x]\" Indicates a positive item  \"[]\" Indicates a negative item  -- DELETE ALL ITEMS NOT EXAMINED]    Constitutional: [x] Appears well-developed and well-nourished [x] No apparent distress      [] Abnormal -     Mental status: [x] Alert and awake  [x] Oriented to person/place/time [x] Able to follow commands    [] Abnormal -     Eyes:   EOM    [x]  Normal    [] Abnormal -   Sclera  [x]  Normal    [] Abnormal -          Discharge [x]  None visible   [] Abnormal -     HENT: [x] Normocephalic, atraumatic  [] Abnormal -   [x] Mouth/Throat: Mucous membranes are moist    External Ears [x] Normal  [] Abnormal -    Neck: [x] No visualized mass [] Abnormal -     Pulmonary/Chest: [x] Respiratory effort normal   [x] No visualized signs of difficulty breathing or respiratory distress        [] Abnormal -      Musculoskeletal:   [x] Normal gait with no signs of ataxia         [x] Normal range of motion of neck        [] Abnormal -     Neurological:        [x] No Facial Asymmetry (Cranial nerve 7 motor function) (limited exam due to video visit)          [x] No gaze palsy        [] Abnormal -          Skin:        [x] No significant exanthematous lesions or discoloration noted on facial skin         [] Abnormal -            Psychiatric:       [x] Normal Affect [] Abnormal -        [x] No Hallucinations    Other pertinent observable physical exam findings:-        We discussed the expected course, resolution and complications of the diagnosis(es) in detail. Medication risks, benefits, costs, interactions, and alternatives were discussed as indicated.   I advised her to contact the office if her condition worsens, changes or fails to improve as anticipated. She expressed understanding with the diagnosis(es) and plan. Diana Draper, who was evaluated through a patient-initiated, synchronous (real-time) audio-video encounter, and/or her healthcare decision maker, is aware that it is a billable service, with coverage as determined by her insurance carrier. She provided verbal consent to proceed: Yes, and patient identification was verified. It was conducted pursuant to the emergency declaration under the [address] [place] [address] and the [employer] Act. A caregiver was present when appropriate. Ability to conduct physical exam was limited. I was at home. The patient was at home.       Julia Jackson MD

## 2020-07-02 ENCOUNTER — TELEPHONE (OUTPATIENT)
Dept: FAMILY MEDICINE CLINIC | Age: 30
End: 2020-07-02

## 2020-07-23 DIAGNOSIS — E55.9 VITAMIN D DEFICIENCY: ICD-10-CM

## 2020-07-27 RX ORDER — ERGOCALCIFEROL 1.25 MG/1
CAPSULE ORAL
Qty: 24 CAP | Refills: 0 | Status: SHIPPED | OUTPATIENT
Start: 2020-07-27 | End: 2021-07-06 | Stop reason: SDUPTHER

## 2020-08-13 DIAGNOSIS — E66.09 OBESITY DUE TO EXCESS CALORIES WITH SERIOUS COMORBIDITY, UNSPECIFIED CLASSIFICATION: ICD-10-CM

## 2020-08-13 RX ORDER — PHENTERMINE HYDROCHLORIDE 37.5 MG/1
TABLET ORAL
Qty: 30 TAB | Refills: 0 | Status: SHIPPED | OUTPATIENT
Start: 2020-08-13 | End: 2021-05-24

## 2021-01-12 ENCOUNTER — OFFICE VISIT (OUTPATIENT)
Dept: OBGYN CLINIC | Age: 31
End: 2021-01-12
Payer: COMMERCIAL

## 2021-01-12 VITALS
WEIGHT: 200 LBS | DIASTOLIC BLOOD PRESSURE: 80 MMHG | SYSTOLIC BLOOD PRESSURE: 132 MMHG | BODY MASS INDEX: 31.39 KG/M2 | HEIGHT: 67 IN

## 2021-01-12 DIAGNOSIS — Z01.419 GYNECOLOGIC EXAM NORMAL: Primary | ICD-10-CM

## 2021-01-12 DIAGNOSIS — Z12.4 ENCOUNTER FOR SCREENING FOR MALIGNANT NEOPLASM OF CERVIX: ICD-10-CM

## 2021-01-12 DIAGNOSIS — N76.0 VAGINITIS AND VULVOVAGINITIS: ICD-10-CM

## 2021-01-12 PROCEDURE — 99385 PREV VISIT NEW AGE 18-39: CPT | Performed by: OBSTETRICS & GYNECOLOGY

## 2021-01-12 RX ORDER — METRONIDAZOLE 500 MG/1
500 TABLET ORAL 2 TIMES DAILY
Qty: 14 TAB | Refills: 0 | Status: SHIPPED | OUTPATIENT
Start: 2021-01-12 | End: 2021-01-19

## 2021-01-12 RX ORDER — BISMUTH SUBSALICYLATE 262 MG
1 TABLET,CHEWABLE ORAL DAILY
COMMUNITY

## 2021-01-12 NOTE — PATIENT INSTRUCTIONS

## 2021-01-12 NOTE — PROGRESS NOTES
Madhu Oliver is a 31 y.o. female, , Patient's last menstrual period was 2020., who presents today for the following:  Chief Complaint   Patient presents with    Annual Exam        Allergies   Allergen Reactions    Lisinopril Swelling     Lips swell       Current Outpatient Medications   Medication Sig    multivitamin (ONE A DAY) tablet Take 1 Tab by mouth daily.  metroNIDAZOLE (FLAGYL) 500 mg tablet Take 1 Tab by mouth two (2) times a day for 7 days.  Vitamin D2 1,250 mcg (50,000 unit) capsule Take 1 capsule by mouth once a week    phentermine (ADIPEX-P) 37.5 mg tablet TAKE 1 TABLET BY MOUTH IN THE MORNING    chlorthalidone (HYGROTEN) 25 mg tablet Take 1 tablet by mouth once daily    amLODIPine (NORVASC) 5 mg tablet Take 1 Tab by mouth daily.  clotrimazole (LOTRIMIN) 1 % topical cream Apply  to affected area two (2) times a day.  traMADol (ULTRAM) 50 mg tablet Take 1 Tab by mouth every six (6) hours as needed for Pain. Max Daily Amount: 200 mg.  ibuprofen (MOTRIN) 800 mg tablet Take 1 Tab by mouth as needed for Pain.  simvastatin (ZOCOR) 10 mg tablet Take 1 Tab by mouth nightly. (Patient taking differently: Take 10 mg by mouth nightly. Patient reports stop taking)     No current facility-administered medications for this visit.         Past Medical History:   Diagnosis Date    Adverse effect of anesthesia     pt states it took \"at least 2 hours to wake up\" after last surgery    Hypercholesterolemia     Hypertension 2011    IUD (intrauterine device) in place 2015    removed     LSIL (low grade squamous intraepithelial lesion) on Pap smear 12    Menorrhagia with regular cycle 2013    Obesity, Class II, BMI 35-39.9 13    BMI 37    Secondary dysmenorrhea 2013       Past Surgical History:   Procedure Laterality Date    HX COLPOSCOPY      HX DILATION AND CURETTAGE      pregnancy related    HX GASTRIC BYPASS      sleeve    HX OTHER SURGICAL  11/2013    laparoscopy/exploratory-uterine issues, checking endometriosis    HX WISDOM TEETH EXTRACTION      SD CONIZATION CERVIX,LOOP ELECTRD N/A 1/26/2018    LOOP ELECTRODE EXCISION PROCEDURE OF CERVIX (LEEP) performed by Yen Virk MD at [address]       Family History   Problem Relation Age of Onset    Hypertension Mother     Hypertension Father     Cancer Father         prostate    Hypertension Sister     Hypertension Maternal Aunt     Diabetes Maternal Grandmother     Hypertension Maternal Grandmother     Diabetes Maternal Grandfather     Hypertension Maternal Grandfather     Diabetes Paternal Grandmother     Hypertension Paternal Grandmother     Diabetes Paternal Grandfather     Hypertension Paternal Grandfather        Social History     Socioeconomic History    Marital status: SINGLE     Spouse name: Not on file    Number of children: Not on file    Years of education: Not on file    Highest education level: Not on file   Occupational History    Not on file   Social Needs    Financial resource strain: Not on file    Food insecurity     Worry: Not on file     Inability: Not on file    Transportation needs     Medical: Not on file     Non-medical: Not on file   Tobacco Use    Smoking status: Never Smoker    Smokeless tobacco: Never Used   Substance and Sexual Activity    Alcohol use: Yes     Alcohol/week: 1.0 standard drinks     Types: 1 Shots of liquor per week     Comment: on special occasions    Drug use: No    Sexual activity: Yes     Partners: Male     Birth control/protection: None     Comment: Pill use began in July 2013.  No other history of hormonal contraceptive use   Lifestyle    Physical activity     Days per week: Not on file     Minutes per session: Not on file    Stress: Not on file   Relationships    Social connections     Talks on phone: Not on file     Gets together: Not on file     Attends Mu-ism service: Not on file     Active member of club or organization: Not on file     Attends meetings of clubs or organizations: Not on file     Relationship status: Not on file    Intimate partner violence     Fear of current or ex partner: Not on file     Emotionally abused: Not on file     Physically abused: Not on file     Forced sexual activity: Not on file   Other Topics Concern    Not on file   Social History Narrative    Not on file         HPI  Annual    Review of Systems   Constitutional: Negative. Respiratory: Negative. Cardiovascular: Negative. Gastrointestinal: Negative. Genitourinary: Negative. Musculoskeletal: Negative. Skin: Negative. Neurological: Negative. Endo/Heme/Allergies: Negative. Psychiatric/Behavioral: Negative. All other systems reviewed and are negative. /80 (BP 1 Location: Right arm, BP Patient Position: Sitting)   Ht 5' 7\" (1.702 m)   Wt 200 lb (90.7 kg)   LMP 12/21/2020   BMI 31.32 kg/m²    OBGyn Exam   Constitutional:      General Appearance: healthy-appearing, well-nourished, and well-developed  Level of Distress: NAD. Ambulation: ambulating normally. Psychiatric:   Insight: good judgement. Mental Status: normal mood and affect and active and alert. Orientation: to time, place, and person. Memory: recent memory normal and remote memory normal.     Head: Head: normocephalic and atraumatic. Neck:   Neck: supple, FROM, trachea midline, and no masses. Lymph Nodes: no cervical LAD, supraclavicular LAD, axillary LAD, or inguinal LAD. Thyroid: no enlargement or nodules and non-tender. Lungs:   Respiratory effort: no dyspnea. .     Cardiovascular:     Pulses including femoral / pedal: normal throughout. Breast: Breast: no masses or abnormal secretions and normal appearance. Abdomen:    no tenderness, guarding, masses, rebound tenderness, or CVA tenderness and non-distended.        Female :   External genitalia: no lesions or rash and normal.   Vagina: moist mucosa;  discharge. Cervix: no discharge, inflammation, or cervical motion tenderness   Uterus: midline, smooth, and non-tender; normal size   Adnexae: no adnexal mass or tenderness and size WNL. Bladder and Urethra: normal bladder and urethra (except where noted). Musculoskeletal[de-identified]   Motor Strength and Tone: normal tone and motor strength. Joints, Bones, and Muscles: no contractures, malalignment, tenderness, or bony abnormalities and normal movement of all extremities. Extremities: no cyanosis, edema, varicosities, or palpable cord. Skin:   Inspection and palpation: no rash, lesions, ulcer, induration, nodules, jaundice, or abnormal nevi and good turgor. Nails: normal.     Back: Thoracolumbar Appearance: normal curvature. 1. Gynecologic exam normal    - PAP IG, CT-NG, RFX APTIMA HPV ASCUS (950805, [id number])    2. Encounter for screening for malignant neoplasm of cervix      3. Vaginitis and vulvovaginitis    - metroNIDAZOLE (FLAGYL) 500 mg tablet; Take 1 Tab by mouth two (2) times a day for 7 days. Dispense: 14 Tab;  Refill: 0        Follow-up and Dispositions    · Return in about 1 year (around 1/12/2022) for annual.

## 2021-01-14 LAB
C TRACH RRNA CVX QL NAA+PROBE: NEGATIVE
CYTOLOGIST CVX/VAG CYTO: NORMAL
CYTOLOGY CVX/VAG DOC CYTO: NORMAL
CYTOLOGY CVX/VAG DOC THIN PREP: NORMAL
DX ICD CODE: NORMAL
LABCORP, 190119: NORMAL
Lab: NORMAL
N GONORRHOEA RRNA CVX QL NAA+PROBE: NEGATIVE
OTHER STN SPEC: NORMAL
STAT OF ADQ CVX/VAG CYTO-IMP: NORMAL

## 2021-02-25 ENCOUNTER — OFFICE VISIT (OUTPATIENT)
Dept: OBGYN CLINIC | Age: 31
End: 2021-02-25
Payer: COMMERCIAL

## 2021-02-25 VITALS
TEMPERATURE: 97.6 F | SYSTOLIC BLOOD PRESSURE: 110 MMHG | WEIGHT: 196 LBS | BODY MASS INDEX: 30.76 KG/M2 | DIASTOLIC BLOOD PRESSURE: 76 MMHG | HEIGHT: 67 IN

## 2021-02-25 DIAGNOSIS — N80.9 ENDOMETRIOSIS: Primary | ICD-10-CM

## 2021-02-25 DIAGNOSIS — R10.2 PELVIC PAIN IN FEMALE: ICD-10-CM

## 2021-02-25 DIAGNOSIS — D21.9 FIBROID: ICD-10-CM

## 2021-02-25 PROCEDURE — 99214 OFFICE O/P EST MOD 30 MIN: CPT | Performed by: OBSTETRICS & GYNECOLOGY

## 2021-02-25 RX ORDER — ELAGOLIX 200 MG/1
1 TABLET, FILM COATED ORAL DAILY
Qty: 21 TAB | Refills: 5 | Status: ON HOLD | OUTPATIENT
Start: 2021-02-25 | End: 2021-05-27

## 2021-02-26 NOTE — PROGRESS NOTES
Dhaval Britt is a 31 y.o. female, , Patient's last menstrual period was 2021., who presents today for the following:  Chief Complaint   Patient presents with    Menstrual Problem        Allergies   Allergen Reactions    Lisinopril Swelling     Lips swell       Current Outpatient Medications   Medication Sig    elagolix (Orilissa) 200 mg tab Take 1 Tab by mouth daily.  multivitamin (ONE A DAY) tablet Take 1 Tab by mouth daily.  Vitamin D2 1,250 mcg (50,000 unit) capsule Take 1 capsule by mouth once a week    phentermine (ADIPEX-P) 37.5 mg tablet TAKE 1 TABLET BY MOUTH IN THE MORNING    chlorthalidone (HYGROTEN) 25 mg tablet Take 1 tablet by mouth once daily    amLODIPine (NORVASC) 5 mg tablet Take 1 Tab by mouth daily.  clotrimazole (LOTRIMIN) 1 % topical cream Apply  to affected area two (2) times a day.  traMADol (ULTRAM) 50 mg tablet Take 1 Tab by mouth every six (6) hours as needed for Pain. Max Daily Amount: 200 mg.  ibuprofen (MOTRIN) 800 mg tablet Take 1 Tab by mouth as needed for Pain.  simvastatin (ZOCOR) 10 mg tablet Take 1 Tab by mouth nightly. (Patient taking differently: Take 10 mg by mouth nightly. Patient reports stop taking)     No current facility-administered medications for this visit.         Past Medical History:   Diagnosis Date    Adverse effect of anesthesia     pt states it took \"at least 2 hours to wake up\" after last surgery    Hypercholesterolemia     Hypertension 2011    IUD (intrauterine device) in place 2015    removed     LSIL (low grade squamous intraepithelial lesion) on Pap smear 12    Menorrhagia with regular cycle 2013    Obesity, Class II, BMI 35-39.9 13    BMI 37    Secondary dysmenorrhea 2013       Past Surgical History:   Procedure Laterality Date    HX COLPOSCOPY      HX DILATION AND CURETTAGE      pregnancy related    HX GASTRIC BYPASS      sleeve    HX OTHER SURGICAL  2013 laparoscopy/exploratory-uterine issues, checking endometriosis    HX WISDOM TEETH EXTRACTION      [place] CONIZATION CERVIX,LOOP ELECTRD N/A 1/26/2018    LOOP ELECTRODE EXCISION PROCEDURE OF CERVIX (LEEP) performed by Shruthi Burdick MD at [address]       Family History   Problem Relation Age of Onset    Hypertension Mother     Hypertension Father     Cancer Father         prostate    Hypertension Sister     Hypertension Maternal Aunt     Diabetes Maternal Grandmother     Hypertension Maternal Grandmother     Diabetes Maternal Grandfather     Hypertension Maternal Grandfather     Diabetes Paternal Grandmother     Hypertension Paternal Grandmother     Diabetes Paternal Grandfather     Hypertension Paternal Grandfather        Social History     Socioeconomic History    Marital status: SINGLE     Spouse name: Not on file    Number of children: Not on file    Years of education: Not on file    Highest education level: Not on file   Occupational History    Not on file   Social Needs    Financial resource strain: Not on file    Food insecurity     Worry: Not on file     Inability: Not on file    Transportation needs     Medical: Not on file     Non-medical: Not on file   Tobacco Use    Smoking status: Never Smoker    Smokeless tobacco: Never Used   Substance and Sexual Activity    Alcohol use: Yes     Alcohol/week: 1.0 standard drinks     Types: 1 Shots of liquor per week     Comment: on special occasions    Drug use: No    Sexual activity: Yes     Partners: Male     Birth control/protection: None     Comment: Pill use began in July 2013.  No other history of hormonal contraceptive use   Lifestyle    Physical activity     Days per week: Not on file     Minutes per session: Not on file    Stress: Not on file   Relationships    Social connections     Talks on phone: Not on file     Gets together: Not on file     Attends Roman [employer] service: Not on file     Active member of club or organization: Not on file     Attends meetings of clubs or organizations: Not on file     Relationship status: Not on file    Intimate partner violence     Fear of current or ex partner: Not on file     Emotionally abused: Not on file     Physically abused: Not on file     Forced sexual activity: Not on file   Other Topics Concern    Not on file   Social History Narrative    Not on file         HPI  Abnormal Bleeding   Reported by patient. Onset/Timing: every cycle   Duration: 7-10 days/month   Quality: heavy; passing clots   Severity: changing pad/tampon every 1-2 hours; requires double protection; interferes with daily activities; requires getting up at night; bleeding through onto clothes/sheets   Context: history of gyn surgery:; history suggestive of endometriosis and adenomysosi  Associated Symptoms: no abdominal pain; no dyspareunia; no fatigue; no dizziness; no anemia/iron supplements; no shortness of breath; no chest pain; no palpations; no bloating; no change in bowel function; no urinary symptoms; no PMS; no vaginal discharge; no vaginal itching/irritation; dysmenorrhea; pelvic pain     Reviewing patient previous medical records- previous provider notes, op notes, imaging , discussed and reviewed with patient findings suggestive of adenomyosis and uterine fibroid. Ultrasound from 2018- The UTERUS MEASURES 11.2 x 6.7 x 7.1 cm. The central  endometrium measures 12mm in thickness. There is no focal endometrial mass or  fluid collection. There may be a 2.1 x 1.7 x 1.4 cm myometrial mass in the  posterior uterine fundus. There is no free fluid in the cul-de-sac.     The ovaries cannot be seen because of overlying bowel gas obscuring the adnexal  regions.      FINDINGS TRANSVAGINAL:  The uterus shows prominent overall size at 11.2 x 7.0 x  6.7 cm. In the uterine fundus to the right of midline, a heterogeneous mass  measures 1.9 x 2.5 x 1.4 cm, suggesting a leiomyoma. The myometrium is generally  mildly heterogeneous. The endometrium is poorly delineated but is thought to  measure 5 mm in the lower uterine segment. It is not well seen in the upper  uterus, possibly due to multiple intramural leiomyomata      Patient reporting she desires hysterectomy secondary to pain and heavy bleeding. Reviewed with patient the physiology of endometriosis. All questions answered. Patient states comprehension. Review of Systems   Constitutional: Negative. Respiratory: Negative. Cardiovascular: Negative. Gastrointestinal: Negative. Genitourinary: Negative. Musculoskeletal: Negative. Skin: Negative. Neurological: Negative. Endo/Heme/Allergies: Negative. Psychiatric/Behavioral: Negative. All other systems reviewed and are negative. /76 (BP 1 Location: Right arm, BP Patient Position: Sitting)   Temp 97.6 °F (36.4 °C)   Ht 5' 7\" (1.702 m)   Wt 196 lb (88.9 kg)   LMP 02/09/2021   BMI 30.70 kg/m²    OBGyn Exam       1. Endometriosis  Start Orlissa    2. Fibroid    - US PELV NON OBS; Future    3. Pelvic pain in female    Total time spent reviewing previous records and with patient 30 min.   More than half that time was in counseling and review of chart

## 2021-03-04 ENCOUNTER — HOSPITAL ENCOUNTER (OUTPATIENT)
Dept: ULTRASOUND IMAGING | Age: 31
Discharge: HOME | End: 2021-03-04
Attending: OBSTETRICS & GYNECOLOGY
Payer: COMMERCIAL

## 2021-03-04 DIAGNOSIS — D21.9 FIBROID: ICD-10-CM

## 2021-03-04 PROCEDURE — 76830 TRANSVAGINAL US NON-OB: CPT

## 2021-03-04 PROCEDURE — 76856 US EXAM PELVIC COMPLETE: CPT

## 2021-03-09 ENCOUNTER — TELEPHONE (OUTPATIENT)
Dept: OBGYN CLINIC | Age: 31
End: 2021-03-09

## 2021-03-09 NOTE — TELEPHONE ENCOUNTER
Patient has been made aware of ultrasound results showing fibroids. A pre op visit has been scheduled.

## 2021-04-01 ENCOUNTER — OFFICE VISIT (OUTPATIENT)
Dept: OBGYN CLINIC | Age: 31
End: 2021-04-01
Payer: COMMERCIAL

## 2021-04-01 VITALS
SYSTOLIC BLOOD PRESSURE: 110 MMHG | WEIGHT: 202 LBS | HEIGHT: 67 IN | OXYGEN SATURATION: 99 % | DIASTOLIC BLOOD PRESSURE: 72 MMHG | BODY MASS INDEX: 31.71 KG/M2 | HEART RATE: 98 BPM | RESPIRATION RATE: 16 BRPM

## 2021-04-01 DIAGNOSIS — D21.9 FIBROIDS: Primary | ICD-10-CM

## 2021-04-01 DIAGNOSIS — N92.0 MENORRHAGIA WITH REGULAR CYCLE: ICD-10-CM

## 2021-04-01 PROCEDURE — 99213 OFFICE O/P EST LOW 20 MIN: CPT | Performed by: OBSTETRICS & GYNECOLOGY

## 2021-04-01 NOTE — PROGRESS NOTES
Yi Mera is a 31 y.o. female, , Patient's last menstrual period was 03/15/2021., who presents today for the following:  Chief Complaint   Patient presents with    Follow-up     Pt here to discuss results of u/s. Allergies   Allergen Reactions    Lisinopril Swelling     Lips swell       Current Outpatient Medications   Medication Sig    elagolix (Orilissa) 200 mg tab Take 1 Tab by mouth daily.  multivitamin (ONE A DAY) tablet Take 1 Tab by mouth daily.  phentermine (ADIPEX-P) 37.5 mg tablet TAKE 1 TABLET BY MOUTH IN THE MORNING    Vitamin D2 1,250 mcg (50,000 unit) capsule Take 1 capsule by mouth once a week    chlorthalidone (HYGROTEN) 25 mg tablet Take 1 tablet by mouth once daily    amLODIPine (NORVASC) 5 mg tablet Take 1 Tab by mouth daily.  clotrimazole (LOTRIMIN) 1 % topical cream Apply  to affected area two (2) times a day.  traMADol (ULTRAM) 50 mg tablet Take 1 Tab by mouth every six (6) hours as needed for Pain. Max Daily Amount: 200 mg.  ibuprofen (MOTRIN) 800 mg tablet Take 1 Tab by mouth as needed for Pain.  simvastatin (ZOCOR) 10 mg tablet Take 1 Tab by mouth nightly. (Patient taking differently: Take 10 mg by mouth nightly. Patient reports stop taking)     No current facility-administered medications for this visit.         Past Medical History:   Diagnosis Date    Adverse effect of anesthesia     pt states it took \"at least 2 hours to wake up\" after last surgery    Hypercholesterolemia     Hypertension 2011    IUD (intrauterine device) in place 2015    removed     LSIL (low grade squamous intraepithelial lesion) on Pap smear 12    Menorrhagia with regular cycle 2013    Obesity, Class II, BMI 35-39.9 13    BMI 37    Secondary dysmenorrhea 2013       Past Surgical History:   Procedure Laterality Date    HX COLPOSCOPY      HX DILATION AND CURETTAGE      pregnancy related    HX GASTRIC BYPASS      sleeve    HX OTHER SURGICAL  11/2013    laparoscopy/exploratory-uterine issues, checking endometriosis    HX WISDOM TEETH EXTRACTION      [place] CONIZATION CERVIX,LOOP ELECTRD N/A 1/26/2018    LOOP ELECTRODE EXCISION PROCEDURE OF CERVIX (LEEP) performed by Odalys Harley MD at [address]       Family History   Problem Relation Age of Onset    Hypertension Mother     Hypertension Father     Cancer Father         prostate    Hypertension Sister     Hypertension Maternal Aunt     Diabetes Maternal Grandmother     Hypertension Maternal Grandmother     Diabetes Maternal Grandfather     Hypertension Maternal Grandfather     Diabetes Paternal Grandmother     Hypertension Paternal Grandmother     Diabetes Paternal Grandfather     Hypertension Paternal Grandfather        Social History     Socioeconomic History    Marital status: SINGLE     Spouse name: Not on file    Number of children: Not on file    Years of education: Not on file    Highest education level: Not on file   Occupational History    Not on file   Social Needs    Financial resource strain: Not on file    Food insecurity     Worry: Not on file     Inability: Not on file    Transportation needs     Medical: Not on file     Non-medical: Not on file   Tobacco Use    Smoking status: Never Smoker    Smokeless tobacco: Never Used   Substance and Sexual Activity    Alcohol use: Yes     Alcohol/week: 1.0 standard drinks     Types: 1 Shots of liquor per week     Comment: on special occasions    Drug use: No    Sexual activity: Yes     Partners: Male     Birth control/protection: None     Comment: Pill use began in July 2013.  No other history of hormonal contraceptive use   Lifestyle    Physical activity     Days per week: Not on file     Minutes per session: Not on file    Stress: Not on file   Relationships    Social connections     Talks on phone: Not on file     Gets together: Not on file     Attends Holiness service: Not on file     Active member of club or organization: Not on file     Attends meetings of clubs or organizations: Not on file     Relationship status: Not on file    Intimate partner violence     Fear of current or ex partner: Not on file     Emotionally abused: Not on file     Physically abused: Not on file     Forced sexual activity: Not on file   Other Topics Concern    Not on file   Social History Narrative    Not on file         HPI   Patient presents for follow up  Patient with history of menorrhagia and irregular cycles  Ultrasound was ordered revealing      The uterus measures 10.6 x 7.6 x 9.8 cm and contains a posterior submucosal 7.0  x 7.4 x 7.9 cm fibroid as well as well as an anterior submucosal 4.7 x 4.5 x 4.4  cm fibroid.     ENDOMETRIUM:  The endometrial stripe measures 5.9 mm.      RIGHT OVARY:  The right ovary is obscured by bowel gas.      LEFT OVARY:  The left ovary is obscured by bowel gas.     Findings reviewed and discussed with patient  Management discussed with patient  Discussed with patient hysteroscopy removal of myoma  Risk and benefits reviewed with patient  All questions answered       Review of Systems   Constitutional: Negative. Respiratory: Negative. Cardiovascular: Negative. Gastrointestinal: Negative. Genitourinary: Negative. Musculoskeletal: Negative. Skin: Negative. Neurological: Negative. Endo/Heme/Allergies: Negative. Psychiatric/Behavioral: Negative. All other systems reviewed and are negative. /72 (BP 1 Location: Right arm)   Pulse 98   Resp 16   Ht 5' 7\" (1.702 m)   Wt 202 lb (91.6 kg)   LMP 03/15/2021   SpO2 99%   BMI 31.64 kg/m²    OBGyn Exam   PE:  Constitutional: General Appearance: healthy-appearing, well-nourished, well-developed, and well groomed. Psychiatric: Orientation: to time, place, and person. Mood and Affect: normal mood and affect and appropriate and active and alert. Abdomen:  Auscultation/Inspection/Palpation: tenderness and mass and non-distended. Hernia: none palpated. 1. Fibroids  Schedule for hypersocopic removal of fibroid    2.  Menorrhagia with regular cycle

## 2021-04-01 NOTE — PATIENT INSTRUCTIONS
Operative Hysteroscopy: What to Expect at [facility]     An operative hysteroscopy is a procedure to find and treat problems with your uterus. It may have been done to remove growths from the uterus. Or it may have been done to treat fertility problems or abnormal bleeding. You may have cramps and vaginal bleeding for several days. If the doctor filled your uterus with air during the procedure, you may have gas pains, a feeling of fullness in your belly, or shoulder pain. These symptoms usually go away in 1 or 2 days. If the doctor filled your uterus with liquid during the procedure, you may have watery vaginal discharge for a few days. Many women are able to return to work on the day after the procedure. But it depends on what was done during the procedure and the type of work you do. This care sheet gives you a general idea about how long it will take for you to recover. But each person recovers at a different pace. Follow the steps below to get better as quickly as possible. How can you care for yourself at home? Activity    · Rest when you feel tired. Getting enough sleep will help you recover.     · Most women are able to return to work on the day after the procedure.     · You may shower and take baths as usual.     · Ask your doctor when it is okay for you to have sex.     · Talk about birth control with your doctor. Do not try to become pregnant until your doctor says it is okay. Diet    · You can eat your normal diet. If your stomach is upset, try bland, low-fat foods like plain rice, broiled chicken, toast, and yogurt.     · You may notice that your bowel movements are not regular right after the procedure. This is common. Try to avoid constipation and straining with bowel movements. You may want to take a fiber supplement every day. If you have not had a bowel movement after a couple of days, ask your doctor about taking a mild laxative.    Medicines    · Your doctor will tell you if and when you can restart your medicines. He or she will also give you instructions about taking any new medicines.     · If you take aspirin or some other blood thinner, ask your doctor if and when to start taking it again. Make sure that you understand exactly what your doctor wants you to do.     · Be safe with medicines. Take pain medicines exactly as directed. ? If the doctor gave you a prescription medicine for pain, take it as prescribed. ? If you are not taking a prescription pain medicine, ask your doctor if you can take an over-the-counter medicine. ? Do not take two or more pain medicines at the same time unless the doctor told you to. Many pain medicines have acetaminophen, which is Tylenol. Too much acetaminophen (Tylenol) can be harmful.     · If you think your pain medicine is making you sick to your stomach:  ? Take your medicine after meals (unless your doctor has told you not to). ? Ask your doctor for a different pain medicine.     · If your doctor prescribed antibiotics, take them as directed. Do not stop taking them just because you feel better. You need to take the full course of antibiotics. Other instructions    · You may have some light vaginal bleeding. Wear sanitary pads if needed. Do not douche or use tampons until your doctor says it is okay.     · You may want to use a heating pad on your belly to help with pain. Use a low heat setting. Follow-up care is a key part of your treatment and safety. Be sure to make and go to all appointments, and call your doctor if you are having problems. It's also a good idea to know your test results and keep a list of the medicines you take. When should you call for help? Call 911 anytime you think you may need emergency care. For example, call if:    · You pass out (lose consciousness).     · You have chest pain, are short of breath, or cough up blood.    Call your doctor now or seek immediate medical care if:    · You have bright red vaginal bleeding that soaks one or more pads in an hour, or you have large clots.     · You have vaginal discharge that has increased in amount or smells bad.     · You are sick to your stomach or cannot drink fluids.     · You have pain that does not get better after you take pain medicine.     · You have signs of infection, such as:  ? Increased pain, swelling, warmth, or redness. ? A fever.     · You cannot pass stools or gas.     · You have signs of a blood clot in your leg (called a deep vein thrombosis), such as:  ? Pain in your calf, back of the knee, thigh, or groin. ? Redness and swelling in your leg. Watch closely for changes in your health, and be sure to contact your doctor if you have any problems. Where can you learn more? Go to http://www.gray.com/  Enter S434 in the search box to learn more about \"Operative Hysteroscopy: What to Expect at Home. \"  Current as of: July 17, 2020               Content Version: 12.8  © 2006-2021 [employer]. Care instructions adapted under license by [employer] (which disclaims liability or warranty for this information). If you have questions about a medical condition or this instruction, always ask your healthcare professional. [place] [address] any warranty or liability for your use of this information. Operative Hysteroscopy: What to Expect at [address] [place] [address]     An operative hysteroscopy is a procedure to find and treat problems with your uterus. It may have been done to remove growths from the uterus. Or it may have been done to treat fertility problems or abnormal bleeding. You may have cramps and vaginal bleeding for several days. If the doctor filled your uterus with air during the procedure, you may have gas pains, a feeling of fullness in your belly, or shoulder pain. These symptoms usually go away in 1 or 2 days.  If the doctor filled your uterus with liquid during the procedure, you may have watery vaginal discharge for a few days. Many women are able to return to work on the day after the procedure. But it depends on what was done during the procedure and the type of work you do. This care sheet gives you a general idea about how long it will take for you to recover. But each person recovers at a different pace. Follow the steps below to get better as quickly as possible. How can you care for yourself at home? Activity    · Rest when you feel tired. Getting enough sleep will help you recover.     · Most women are able to return to work on the day after the procedure.     · You may shower and take baths as usual.     · Ask your doctor when it is okay for you to have sex.     · Talk about birth control with your doctor. Do not try to become pregnant until your doctor says it is okay. Diet    · You can eat your normal diet. If your stomach is upset, try bland, low-fat foods like plain rice, broiled chicken, toast, and yogurt.     · You may notice that your bowel movements are not regular right after the procedure. This is common. Try to avoid constipation and straining with bowel movements. You may want to take a fiber supplement every day. If you have not had a bowel movement after a couple of days, ask your doctor about taking a mild laxative. Medicines    · Your doctor will tell you if and when you can restart your medicines. He or she will also give you instructions about taking any new medicines.     · If you take aspirin or some other blood thinner, ask your doctor if and when to start taking it again. Make sure that you understand exactly what your doctor wants you to do.     · Be safe with medicines. Take pain medicines exactly as directed. ? If the doctor gave you a prescription medicine for pain, take it as prescribed. ? If you are not taking a prescription pain medicine, ask your doctor if you can take an over-the-counter medicine.   ? Do not take two or more pain medicines at the same time unless the doctor told you to. Many pain medicines have acetaminophen, which is Tylenol. Too much acetaminophen (Tylenol) can be harmful.     · If you think your pain medicine is making you sick to your stomach:  ? Take your medicine after meals (unless your doctor has told you not to). ? Ask your doctor for a different pain medicine.     · If your doctor prescribed antibiotics, take them as directed. Do not stop taking them just because you feel better. You need to take the full course of antibiotics. Other instructions    · You may have some light vaginal bleeding. Wear sanitary pads if needed. Do not douche or use tampons until your doctor says it is okay.     · You may want to use a heating pad on your belly to help with pain. Use a low heat setting. Follow-up care is a key part of your treatment and safety. Be sure to make and go to all appointments, and call your doctor if you are having problems. It's also a good idea to know your test results and keep a list of the medicines you take. When should you call for help? Call 911 anytime you think you may need emergency care. For example, call if:    · You pass out (lose consciousness).     · You have chest pain, are short of breath, or cough up blood. Call your doctor now or seek immediate medical care if:    · You have bright red vaginal bleeding that soaks one or more pads in an hour, or you have large clots.     · You have vaginal discharge that has increased in amount or smells bad.     · You are sick to your stomach or cannot drink fluids.     · You have pain that does not get better after you take pain medicine.     · You have signs of infection, such as:  ? Increased pain, swelling, warmth, or redness. ? A fever.     · You cannot pass stools or gas.     · You have signs of a blood clot in your leg (called a deep vein thrombosis), such as:  ? Pain in your calf, back of the knee, thigh, or groin. ? Redness and swelling in your leg. Watch closely for changes in your health, and be sure to contact your doctor if you have any problems. Where can you learn more? Go to http://www.gray.com/  Enter L168 in the search box to learn more about \"Operative Hysteroscopy: What to Expect at Home. \"  Current as of: July 17, 2020               Content Version: 12.8  © 2393-9217 [employer]. Care instructions adapted under license by [employer] (which disclaims liability or warranty for this information). If you have questions about a medical condition or this instruction, always ask your healthcare professional. [place] [address] any warranty or liability for your use of this information.

## 2021-05-17 ENCOUNTER — OFFICE VISIT (OUTPATIENT)
Dept: OBGYN CLINIC | Age: 31
End: 2021-05-17
Payer: COMMERCIAL

## 2021-05-17 VITALS
WEIGHT: 194 LBS | DIASTOLIC BLOOD PRESSURE: 80 MMHG | SYSTOLIC BLOOD PRESSURE: 143 MMHG | HEIGHT: 67 IN | BODY MASS INDEX: 30.45 KG/M2

## 2021-05-17 DIAGNOSIS — D25.0 FIBROIDS, SUBMUCOSAL: ICD-10-CM

## 2021-05-17 DIAGNOSIS — N92.0 MENORRHAGIA WITH REGULAR CYCLE: Primary | ICD-10-CM

## 2021-05-17 PROCEDURE — 99214 OFFICE O/P EST MOD 30 MIN: CPT | Performed by: OBSTETRICS & GYNECOLOGY

## 2021-05-17 NOTE — PROGRESS NOTES
Robert Tee is a 27 y.o. female, , Patient's last menstrual period was 2021., who presents today for the following:  Chief Complaint   Patient presents with    Pre-op Exam        Allergies   Allergen Reactions    Lisinopril Swelling     Lips swell       Current Outpatient Medications   Medication Sig    elagolix (Orilissa) 200 mg tab Take 1 Tab by mouth daily.  multivitamin (ONE A DAY) tablet Take 1 Tab by mouth daily.  phentermine (ADIPEX-P) 37.5 mg tablet TAKE 1 TABLET BY MOUTH IN THE MORNING    Vitamin D2 1,250 mcg (50,000 unit) capsule Take 1 capsule by mouth once a week    chlorthalidone (HYGROTEN) 25 mg tablet Take 1 tablet by mouth once daily    amLODIPine (NORVASC) 5 mg tablet Take 1 Tab by mouth daily.  clotrimazole (LOTRIMIN) 1 % topical cream Apply  to affected area two (2) times a day.  traMADol (ULTRAM) 50 mg tablet Take 1 Tab by mouth every six (6) hours as needed for Pain. Max Daily Amount: 200 mg.  ibuprofen (MOTRIN) 800 mg tablet Take 1 Tab by mouth as needed for Pain.  simvastatin (ZOCOR) 10 mg tablet Take 1 Tab by mouth nightly. (Patient taking differently: Take 10 mg by mouth nightly. Patient reports stop taking)     No current facility-administered medications for this visit.         Past Medical History:   Diagnosis Date    Adverse effect of anesthesia     pt states it took \"at least 2 hours to wake up\" after last surgery    Hypercholesterolemia     Hypertension 2011    IUD (intrauterine device) in place 2015    removed     LSIL (low grade squamous intraepithelial lesion) on Pap smear 12    Menorrhagia with regular cycle 2013    Obesity, Class II, BMI 35-39.9 13    BMI 37    Secondary dysmenorrhea 2013       Past Surgical History:   Procedure Laterality Date    HX COLPOSCOPY      HX DILATION AND CURETTAGE      pregnancy related    HX GASTRIC BYPASS      sleeve    HX OTHER SURGICAL  2013 laparoscopy/exploratory-uterine issues, checking endometriosis    HX WISDOM TEETH EXTRACTION      [place] CONIZATION CERVIX,LOOP ELECTRD N/A 1/26/2018    LOOP ELECTRODE EXCISION PROCEDURE OF CERVIX (LEEP) performed by Chen Holman MD at [address]       Family History   Problem Relation Age of Onset    Hypertension Mother     Hypertension Father     Cancer Father         prostate    Hypertension Sister     Hypertension Maternal Aunt     Diabetes Maternal Grandmother     Hypertension Maternal Grandmother     Diabetes Maternal Grandfather     Hypertension Maternal Grandfather     Diabetes Paternal Grandmother     Hypertension Paternal Grandmother     Diabetes Paternal Grandfather     Hypertension Paternal Grandfather        Social History     Socioeconomic History    Marital status: SINGLE     Spouse name: Not on file    Number of children: Not on file    Years of education: Not on file    Highest education level: Not on file   Occupational History    Not on file   Social Needs    Financial resource strain: Not on file    Food insecurity     Worry: Not on file     Inability: Not on file    Transportation needs     Medical: Not on file     Non-medical: Not on file   Tobacco Use    Smoking status: Never Smoker    Smokeless tobacco: Never Used   Substance and Sexual Activity    Alcohol use: Yes     Alcohol/week: 1.0 standard drinks     Types: 1 Shots of liquor per week     Comment: on special occasions    Drug use: No    Sexual activity: Yes     Partners: Male     Birth control/protection: None     Comment: Pill use began in July 2013.  No other history of hormonal contraceptive use   Lifestyle    Physical activity     Days per week: Not on file     Minutes per session: Not on file    Stress: Not on file   Relationships    Social connections     Talks on phone: Not on file     Gets together: Not on file     Attends Mandaen service: Not on file     Active member of club or organization: Not on file     Attends meetings of clubs or organizations: Not on file     Relationship status: Not on file    Intimate partner violence     Fear of current or ex partner: Not on file     Emotionally abused: Not on file     Physically abused: Not on file     Forced sexual activity: Not on file   Other Topics Concern    Not on file   Social History Narrative    Not on file         HPI  Patient presents for follow up/ pre op exam  Patient with history of menorrhagia and irregular cycles  Ultrasound was ordered revealing        The uterus measures 10.6 x 7.6 x 9.8 cm and contains a posterior submucosal 7.0  x 7.4 x 7.9 cm fibroid as well as well as an anterior submucosal 4.7 x 4.5 x 4.4  cm fibroid.     ENDOMETRIUM:  The endometrial stripe measures 5.9 mm.      RIGHT OVARY:  The right ovary is obscured by bowel gas.      LEFT OVARY:  The left ovary is obscured by bowel gas.     Findings reviewed and discussed with patient  Management discussed with patient  Discussed with patient hysteroscopy removal of myoma  Risk and benefits reviewed with patient  All questions answered    Review of Systems   Constitutional: Negative. Respiratory: Negative. Cardiovascular: Negative. Gastrointestinal: Negative. Genitourinary: Negative. Musculoskeletal: Negative. Skin: Negative. Neurological: Negative. Endo/Heme/Allergies: Negative. Psychiatric/Behavioral: Negative. All other systems reviewed and are negative. BP (!) 143/80 (BP 1 Location: Left upper arm)   Ht 5' 7\" (1.702 m)   Wt 194 lb (88 kg)   LMP 05/07/2021   BMI 30.38 kg/m²    OBGyn Exam   PE:  Constitutional: General Appearance: healthy-appearing, well-nourished, well-developed, and well groomed. Psychiatric: Orientation: to time, place, and person. Mood and Affect: normal mood and affect and appropriate and active and alert. Abdomen: Auscultation/Inspection/Palpation: tenderness and mass and non-distended.  Hernia: none palpated. Female Genitalia: Vulva: no masses, atrophy, or lesions. Bladder/Urethra: no urethral discharge or mass and normal meatus and bladder non distended. Vagina no tenderness, erythema, cystocele, rectocele, abnormal vaginal discharge, or vesicle(s) or ulcers. Cervix: no discharge or cervical motion tenderness and grossly normal.     Uterus: mobile, non-tender, and no uterine prolapse. enlarge    Adnexa/Parametria: no adnexal tenderness. 1. Menorrhagia with regular cycle      2.  Fibroids, submucosal  Scheduled for operative hysteroscopy with Myosure for removal of myoma , with D&C  Total encounter time spent reviewing procedure, risk and benefits, post operative benefits and expectations 35 min

## 2021-05-17 NOTE — PATIENT INSTRUCTIONS
Operative Hysteroscopy: What to Expect at [facility]     An operative hysteroscopy is a procedure to find and treat problems with your uterus. It may have been done to remove growths from the uterus. Or it may have been done to treat fertility problems or abnormal bleeding. You may have cramps and vaginal bleeding for several days. If the doctor filled your uterus with air during the procedure, you may have gas pains, a feeling of fullness in your belly, or shoulder pain. These symptoms usually go away in 1 or 2 days. If the doctor filled your uterus with liquid during the procedure, you may have watery vaginal discharge for a few days. Many women are able to return to work on the day after the procedure. But it depends on what was done during the procedure and the type of work you do. This care sheet gives you a general idea about how long it will take for you to recover. But each person recovers at a different pace. Follow the steps below to get better as quickly as possible. How can you care for yourself at home? Activity    · Rest when you feel tired. Getting enough sleep will help you recover.     · Most women are able to return to work on the day after the procedure.     · You may shower and take baths as usual.     · Ask your doctor when it is okay for you to have sex.     · Talk about birth control with your doctor. Do not try to become pregnant until your doctor says it is okay. Diet    · You can eat your normal diet. If your stomach is upset, try bland, low-fat foods like plain rice, broiled chicken, toast, and yogurt.     · You may notice that your bowel movements are not regular right after the procedure. This is common. Try to avoid constipation and straining with bowel movements. You may want to take a fiber supplement every day. If you have not had a bowel movement after a couple of days, ask your doctor about taking a mild laxative.    Medicines    · Your doctor will tell you if and when you can restart your medicines. He or she will also give you instructions about taking any new medicines.     · If you take aspirin or some other blood thinner, ask your doctor if and when to start taking it again. Make sure that you understand exactly what your doctor wants you to do.     · Be safe with medicines. Take pain medicines exactly as directed. ? If the doctor gave you a prescription medicine for pain, take it as prescribed. ? If you are not taking a prescription pain medicine, ask your doctor if you can take an over-the-counter medicine. ? Do not take two or more pain medicines at the same time unless the doctor told you to. Many pain medicines have acetaminophen, which is Tylenol. Too much acetaminophen (Tylenol) can be harmful.     · If you think your pain medicine is making you sick to your stomach:  ? Take your medicine after meals (unless your doctor has told you not to). ? Ask your doctor for a different pain medicine.     · If your doctor prescribed antibiotics, take them as directed. Do not stop taking them just because you feel better. You need to take the full course of antibiotics. Other instructions    · You may have some light vaginal bleeding. Wear sanitary pads if needed. Do not douche or use tampons until your doctor says it is okay.     · You may want to use a heating pad on your belly to help with pain. Use a low heat setting. Follow-up care is a key part of your treatment and safety. Be sure to make and go to all appointments, and call your doctor if you are having problems. It's also a good idea to know your test results and keep a list of the medicines you take. When should you call for help? Call 911 anytime you think you may need emergency care. For example, call if:    · You pass out (lose consciousness).     · You have chest pain, are short of breath, or cough up blood.    Call your doctor now or seek immediate medical care if:    · You have bright red vaginal bleeding that soaks one or more pads in an hour, or you have large clots.     · You have vaginal discharge that has increased in amount or smells bad.     · You are sick to your stomach or cannot drink fluids.     · You have pain that does not get better after you take pain medicine.     · You have signs of infection, such as:  ? Increased pain, swelling, warmth, or redness. ? A fever.     · You cannot pass stools or gas.     · You have signs of a blood clot in your leg (called a deep vein thrombosis), such as:  ? Pain in your calf, back of the knee, thigh, or groin. ? Redness and swelling in your leg. Watch closely for changes in your health, and be sure to contact your doctor if you have any problems. Where can you learn more? Go to http://isabel-sayra.info/  Enter U923 in the search box to learn more about \"Operative Hysteroscopy: What to Expect at Home. \"  Current as of: July 17, 2020               Content Version: 12.8  © 2006-2021 [employer]. Care instructions adapted under license by [employer] (which disclaims liability or warranty for this information). If you have questions about a medical condition or this instruction, always ask your healthcare professional. [address] any warranty or liability for your use of this information. Operative Hysteroscopy: Before Your Procedure  What is an operative hysteroscopy? An operative hysteroscopy is a procedure to find and treat problems with your uterus. It may be done to remove growths from the uterus. It may also be used to treat fertility problems or abnormal bleeding. The doctor will guide a lighted tube through the cervix and into the uterus. This tube is called a hysteroscope, or scope. The doctor will fill your uterus with liquid. This makes it easier to see the inside of your uterus with the scope.  Then the doctor will put tools through the scope to do the procedure. Most of the liquid that the doctor puts in will flow out when the scope is removed. You will most likely go home the same day. Many women are able to go back to work the next day. But it depends on what was done and the type of work you do. How do you prepare for the procedure? Preparing for the procedure  · Understand exactly what procedure is planned, along with the risks, benefits, and other options. · If you take aspirin or some other blood thinner, ask your doctor if you should stop taking it before your procedure. Make sure that you understand exactly what your doctor wants you to do. These medicines increase the risk of bleeding. · Tell your doctor ALL the medicines, vitamins, supplements, and herbal remedies you take. Some may increase the risk of problems during your procedure. Your doctor will tell you if you should stop taking any of them before the procedure and how soon to do it. · Make sure your doctor and the hospital have a copy of your advance directive. If you don't have one, you may want to prepare one. It lets others know your health care wishes. It's a good thing to have before any type of surgery or procedure. What happens on the day of the procedure? · Follow the instructions exactly about when to stop eating and drinking. If you don't, your procedure may be canceled. If your doctor has instructed you to take your medicines on the day of the procedure, take them with only a sip of water.     · Take a bath or shower before you come in for your procedure. Do not apply lotions, perfumes, deodorants, or nail polish.     · Take off all jewelry and piercings. And take out contact lenses, if you wear them. At the hospital or surgery center   · Bring a picture ID.     · You will be kept comfortable and safe by your anesthesia provider. The anesthesia may make you sleep. Or it may just numb the area being worked on.     · The procedure will probably take less than an hour.    When should you call your doctor? · You have questions or concerns.     · You don't understand how to prepare for your procedure.     · You become ill before the procedure (such as fever, flu, or a cold).     · You need to reschedule or have changed your mind about having the procedure. Where can you learn more? Go to http://www.gray.com/  Enter Q514 in the search box to learn more about \"Operative Hysteroscopy: Before Your Procedure. \"  Current as of: July 17, 2020               Content Version: 12.8  © 2122-1536 [employer], [employer]. Care instructions adapted under license by [employer] (which disclaims liability or warranty for this information). If you have questions about a medical condition or this instruction, always ask your healthcare professional. [address] any warranty or liability for your use of this information.

## 2021-05-18 ENCOUNTER — HOSPITAL ENCOUNTER (OUTPATIENT)
Dept: PREADMISSION TESTING | Age: 31
Discharge: HOME | End: 2021-05-18
Payer: COMMERCIAL

## 2021-05-18 VITALS
HEIGHT: 67 IN | SYSTOLIC BLOOD PRESSURE: 143 MMHG | TEMPERATURE: 98.1 F | HEART RATE: 78 BPM | DIASTOLIC BLOOD PRESSURE: 88 MMHG | WEIGHT: 195.99 LBS | BODY MASS INDEX: 30.76 KG/M2 | RESPIRATION RATE: 18 BRPM | OXYGEN SATURATION: 96 %

## 2021-05-18 LAB
ABO + RH BLD: NORMAL
ANION GAP SERPL CALC-SCNC: 4 MMOL/L (ref 5–15)
BLOOD GROUP ANTIBODIES SERPL: NEGATIVE
BUN SERPL-MCNC: 10 MG/DL (ref 6–20)
BUN/CREAT SERPL: 12 (ref 12–20)
CA-I BLD-MCNC: 9.2 MG/DL (ref 8.5–10.1)
CHLORIDE SERPL-SCNC: 109 MMOL/L (ref 97–108)
CO2 SERPL-SCNC: 27 MMOL/L (ref 21–32)
CREAT SERPL-MCNC: 0.85 MG/DL (ref 0.55–1.02)
ERYTHROCYTE [DISTWIDTH] IN BLOOD BY AUTOMATED COUNT: 14.5 % (ref 11.5–14.5)
GLUCOSE SERPL-MCNC: 78 MG/DL (ref 65–100)
HCT VFR BLD AUTO: 32.1 % (ref 35–47)
HGB BLD-MCNC: 10 G/DL (ref 11.5–16)
MCH RBC QN AUTO: 24.8 PG (ref 26–34)
MCHC RBC AUTO-ENTMCNC: 31.2 G/DL (ref 30–36.5)
MCV RBC AUTO: 79.5 FL (ref 80–99)
NRBC # BLD: 0 K/UL (ref 0–0.01)
NRBC BLD-RTO: 0 PER 100 WBC
PLATELET # BLD AUTO: 354 K/UL (ref 150–400)
PMV BLD AUTO: 11.5 FL (ref 8.9–12.9)
POTASSIUM SERPL-SCNC: 4.1 MMOL/L (ref 3.5–5.1)
RBC # BLD AUTO: 4.04 M/UL (ref 3.8–5.2)
SODIUM SERPL-SCNC: 140 MMOL/L (ref 136–145)
SPECIMEN EXP DATE BLD: NORMAL
WBC # BLD AUTO: 4.8 K/UL (ref 3.6–11)

## 2021-05-18 PROCEDURE — 86901 BLOOD TYPING SEROLOGIC RH(D): CPT

## 2021-05-18 PROCEDURE — 36415 COLL VENOUS BLD VENIPUNCTURE: CPT

## 2021-05-18 PROCEDURE — 85027 COMPLETE CBC AUTOMATED: CPT

## 2021-05-18 PROCEDURE — 80048 BASIC METABOLIC PNL TOTAL CA: CPT

## 2021-05-18 NOTE — PROGRESS NOTES
0900- Dr. [facility] office called spoke with Tawanna Maloney LPN requested for procedure scheduled on 5/27/2021 to be verified with Dr. Candace Bolden and made aware H & P dated 2/25/2021. Rosa Hong stated patient had office visit yesterday and will follow up with Dr. Candace Bolden regarding procedure and send new paperwork as soon as possible.

## 2021-05-18 NOTE — PROGRESS NOTES
200- Dr. Robbie Saenz office called message left for Marge Cottrell LPN re: patient's CBC result today 5/18/2021 HGB 10.0 and HCT 32.1 requested to make Dr. Sravanthi Macias aware of results and return call to PAT department if any further instructions given. [facility] returned call to PAT stated Dr. Sravanthi Macias has reviewed labs no new orders given.

## 2021-05-18 NOTE — PROGRESS NOTES
5308- Patient stated during PAT visit she takes Ibuprofen 800mg as needed for pain stated she will not take anymore prior to her surgery scheduled on 5/27/2021.

## 2021-05-21 ENCOUNTER — VIRTUAL VISIT (OUTPATIENT)
Dept: FAMILY MEDICINE CLINIC | Age: 31
End: 2021-05-21
Payer: COMMERCIAL

## 2021-05-21 DIAGNOSIS — E78.2 MIXED HYPERLIPIDEMIA: ICD-10-CM

## 2021-05-21 DIAGNOSIS — E66.01 SEVERE OBESITY (BMI 35.0-39.9) WITH COMORBIDITY (HCC): ICD-10-CM

## 2021-05-21 DIAGNOSIS — E55.9 VITAMIN D DEFICIENCY: ICD-10-CM

## 2021-05-21 DIAGNOSIS — I10 ESSENTIAL HYPERTENSION: Primary | ICD-10-CM

## 2021-05-21 PROCEDURE — 99214 OFFICE O/P EST MOD 30 MIN: CPT | Performed by: FAMILY MEDICINE

## 2021-05-21 NOTE — PROGRESS NOTES
Mikael Rutherford is a 27 y.o. female who was seen by synchronous (real-time) audio-video technology on 5/21/2021. Consent:  She and/or her healthcare decision maker is aware that this patient-initiated Telehealth encounter is a billable service, with coverage as determined by her insurance carrier. She is aware that she may receive a bill and has provided verbal consent to proceed: Yes    I was at home while conducting this encounter. Weight Loss Progress Note: Initial Physician Visit      Mikael Rutherford is a 27 y.o. female with BMI   30.70   who is here for her Initial Evaluation for the medical bariatric care. CC: I want to be healthier  Last august she had a sleeve procedure  She feels like her weight loss has slowed recently but down 60 lbs so far  She is at 195  She is nt on any appetite meds   she feels in charge of her eating  Exercise: started recently to work out at a [email or web address] 2 x per week  Track walking 3 days- 30 mins    Weight History  Current weight 195 and   Goal weight 170,   Highest weight 252, 238 at time of surgery  (See weight gain time line scanned into media section of chart)       Usual latest meal times? 7 pm      Which meals are skipped?   none      Who cooks/makes your meals? Her mother and sister live with her and all cook and buy groceries   Who shops/buys your food? All of them     Weight loss History  How many weight loss attempts have you had? Worked with me in the past  Which program were you most successful doing? The sleeve surgery    Significant Medical History    Have you ever taken appetite suppressants? no   If yes: Rx or OTC? If yes; Any negative side effects?     Ever diagnosed with sleep apnea or put on CPAP no  Sleeps 9 hrs  Ever had bariatric surgery: yes    Pregnant or planning on becoming pregnant w/in 6 months: no        Significant Psychosocial History   Any history of drug abuse or dependence: no  Current Major Lifestyle Changes: no  Any potential unsupportive people: no    History of binge eating disorder or anorexia : no   If yes, are you currently being treated no    Social History  Social History     Tobacco Use    Smoking status: Never Smoker    Smokeless tobacco: Never Used   Substance Use Topics    Alcohol use: Yes     Alcohol/week: 1.0 standard drinks     Types: 1 Shots of liquor per week     Comment: on special occasions     How many times a week do you eat out?  1 and that is breakfast    Do you drink any EtOH?  no   If so, how much? Do you have upcoming any travel in the next 6 weeks?  no   If so, what do you have planned? Exercise  How many days a week do you currently exercise?   5 days  Have you ever been told by a physician not to exercise?  no    Having BM two times a week  Takes tramadol the firstcouple days        Labs: bariatric sugery ordered labs and she has a plan to have that drawn this week    Review of Systems  Complete ROS negative except where noted above    712    ROS    PHYSICAL EXAMINATION:  [ INSTRUCTIONS:  \"[x]\" Indicates a positive item  \"[]\" Indicates a negative item  -- DELETE ALL ITEMS NOT EXAMINED]  Vital Signs: (As obtained by patient/caregiver at home)  Visit Vitals  LMP 05/07/2021 (Exact Date)        Constitutional: [x] Appears well-developed and well-nourished [x] No apparent distress      [] Abnormal -     Mental status: [x] Alert and awake  [x] Oriented to person/place/time [x] Able to follow commands    [] Abnormal -     Eyes:   EOM    [x]  Normal    [] Abnormal -   Sclera  [x]  Normal    [] Abnormal -          Discharge [x]  None visible   [] Abnormal -     HENT: [x] Normocephalic, atraumatic  [] Abnormal -   [x] Mouth/Throat: Mucous membranes are moist    External Ears [x] Normal  [] Abnormal -  Skin tags -    Acanthosis Nigricans -       Neck: [x] No visualized mass [] Abnormal -     Pulmonary/Chest: [x] Respiratory effort normal   [x] No visualized signs of difficulty breathing or respiratory distress        [] Abnormal -      Musculoskeletal:   [x] Normal gait with no signs of ataxia         [x] Normal range of motion of neck        [] Abnormal -     Neurological:        [x] No Facial Asymmetry (Cranial nerve 7 motor function) (limited exam due to video visit)          [x] No gaze palsy        [] Abnormal -          Skin:        [x] No significant exanthematous lesions or discoloration noted on facial skin         [] Abnormal -            Psychiatric:       [x] Normal Affect [] Abnormal -        [x] No Hallucinations    Assessment & Plan:   Diagnoses and all orders for this visit:    1. Essential hypertension  No change in meds  2. Severe obesity (BMI 35.0-39. 9) with comorbidity ([address]  DIET: cont low carb plan  The exercise just started so it makes sense that the weight loss had slowed down  ACTIVITY  Has a good work out routine  MEDICATION none at this time. She feels in control of her eating  3. Mixed hyperlipidemia  Will be monitoring and treat as indicated  4. Vitamin D deficiency  Monitor and treat as indicated            Diet Plan: Activity Plan:    Medication Plan      There are no Patient Instructions on file for this visit. Based on his history and exam, Jerson Martinez is a good candidate for the Non-[facility] Weight Loss Program         * The primary encounter diagnosis was Essential hypertension. Diagnoses of Severe obesity (BMI 35.0-39. 9) with comorbidity ([address] Mixed hyperlipidemia, and Vitamin D deficiency were also pertinent to this visit. Other pertinent observable physical exam findings:-    Coding Help - Use CPT Codes 17519-34802, 43004-92301 for Established and New Patients respectively, either employing EM elements or Time rules. Other codes (example consult codes) may also apply. We discussed the expected course, resolution and complications of the diagnosis(es) in detail.   Medication risks, benefits, costs, interactions, and alternatives were discussed as indicated. I advised her to contact the office if her condition worsens, changes or fails to improve as anticipated. She expressed understanding with the diagnosis(es) and plan. Pursuant to the emergency declaration under the [facility], [facility] waiver authority and the [employer] Act, this Virtual  Visit was conducted, with patient's consent, to reduce the patient's risk of exposure to COVID-19 and provide continuity of care for an established patient. Services were provided through a video synchronous discussion virtually to substitute for in-person clinic visit.     Marti Marx MD

## 2021-05-21 NOTE — PROGRESS NOTES
1. Have you been to the ER, urgent care clinic since your last visit? Hospitalized since your last visit? No    2. Have you seen or consulted any other health care providers outside of the [address] [place] [address] since your last visit? Include any pap smears or colon screening. No     Chief Complaint   Patient presents with    Surgical Follow-up     Gastric sleeve. Patient-Reported Vitals 5/21/2021   Patient-Reported Weight 196lb      3 most recent PHQ Screens 5/21/2021   Little interest or pleasure in doing things Not at all   Feeling down, depressed, irritable, or hopeless Not at all   Total Score PHQ 2 0     Abuse Screening Questionnaire 5/21/2021   Do you ever feel afraid of your partner? N   Are you in a relationship with someone who physically or mentally threatens you? N   Is it safe for you to go home?  Y     Learning Assessment 1/16/2018   PRIMARY LEARNER Patient   HIGHEST LEVEL OF EDUCATION - PRIMARY LEARNER  GRADUATED HIGH SCHOOL OR GED   BARRIERS PRIMARY LEARNER NONE   CO-LEARNER CAREGIVER No   PRIMARY LANGUAGE ENGLISH   LEARNER PREFERENCE PRIMARY DEMONSTRATION   ANSWERED BY patient   RELATIONSHIP SELF

## 2021-05-23 ENCOUNTER — HOSPITAL ENCOUNTER (OUTPATIENT)
Dept: PREADMISSION TESTING | Age: 31
Discharge: HOME | End: 2021-05-23
Payer: COMMERCIAL

## 2021-05-23 LAB — SARS-COV-2, COV2: NORMAL

## 2021-05-23 PROCEDURE — U0003 INFECTIOUS AGENT DETECTION BY NUCLEIC ACID (DNA OR RNA); SEVERE ACUTE RESPIRATORY SYNDROME CORONAVIRUS 2 (SARS-COV-2) (CORONAVIRUS DISEASE [COVID-19]), AMPLIFIED PROBE TECHNIQUE, MAKING USE OF HIGH THROUGHPUT TECHNOLOGIES AS DESCRIBED BY CMS-2020-01-R: HCPCS

## 2021-05-24 LAB — SARS-COV-2, COV2NT: NOT DETECTED

## 2021-05-27 ENCOUNTER — ANESTHESIA EVENT (OUTPATIENT)
Dept: SURGERY | Age: 31
End: 2021-05-27
Payer: COMMERCIAL

## 2021-05-27 ENCOUNTER — ANESTHESIA (OUTPATIENT)
Dept: SURGERY | Age: 31
End: 2021-05-27
Payer: COMMERCIAL

## 2021-05-27 ENCOUNTER — HOSPITAL ENCOUNTER (OUTPATIENT)
Age: 31
Discharge: HOME | End: 2021-05-27
Attending: OBSTETRICS & GYNECOLOGY | Admitting: OBSTETRICS & GYNECOLOGY
Payer: COMMERCIAL

## 2021-05-27 VITALS
DIASTOLIC BLOOD PRESSURE: 65 MMHG | OXYGEN SATURATION: 98 % | RESPIRATION RATE: 18 BRPM | TEMPERATURE: 97.6 F | HEART RATE: 76 BPM | SYSTOLIC BLOOD PRESSURE: 113 MMHG

## 2021-05-27 DIAGNOSIS — G89.18 POST-OP PAIN: Primary | ICD-10-CM

## 2021-05-27 PROBLEM — Z51.81 ENCOUNTER FOR MONITORING ORAL HORMONAL THERAPY FOR HEAVY MENSES: Status: ACTIVE | Noted: 2021-05-27

## 2021-05-27 PROBLEM — N92.0 ENCOUNTER FOR MONITORING ORAL HORMONAL THERAPY FOR HEAVY MENSES: Status: ACTIVE | Noted: 2021-05-27

## 2021-05-27 PROBLEM — Z79.3 ENCOUNTER FOR MONITORING ORAL HORMONAL THERAPY FOR HEAVY MENSES: Status: ACTIVE | Noted: 2021-05-27

## 2021-05-27 LAB — HCG SERPL QL: NEGATIVE

## 2021-05-27 PROCEDURE — 74011250636 HC RX REV CODE- 250/636: Performed by: ANESTHESIOLOGY

## 2021-05-27 PROCEDURE — 77030033137 HC TBNG OUTFLO AQUILEX ST HOLO -B: Performed by: OBSTETRICS & GYNECOLOGY

## 2021-05-27 PROCEDURE — 76210000063 HC OR PH I REC FIRST 0.5 HR: Performed by: OBSTETRICS & GYNECOLOGY

## 2021-05-27 PROCEDURE — 88305 TISSUE EXAM BY PATHOLOGIST: CPT

## 2021-05-27 PROCEDURE — 76210000021 HC REC RM PH II 0.5 TO 1 HR: Performed by: OBSTETRICS & GYNECOLOGY

## 2021-05-27 PROCEDURE — 74011250636 HC RX REV CODE- 250/636: Performed by: OBSTETRICS & GYNECOLOGY

## 2021-05-27 PROCEDURE — 58558 HYSTEROSCOPY BIOPSY: CPT | Performed by: OBSTETRICS & GYNECOLOGY

## 2021-05-27 PROCEDURE — 84703 CHORIONIC GONADOTROPIN ASSAY: CPT

## 2021-05-27 PROCEDURE — 77030033136 HC TBNG INFLO AQUILEX ST HOLO -C: Performed by: OBSTETRICS & GYNECOLOGY

## 2021-05-27 PROCEDURE — 74011000250 HC RX REV CODE- 250: Performed by: ANESTHESIOLOGY

## 2021-05-27 PROCEDURE — 76010000138 HC OR TIME 0.5 TO 1 HR: Performed by: OBSTETRICS & GYNECOLOGY

## 2021-05-27 PROCEDURE — 77030026236 HC DEV TISS RMVL MYOSUR HOLO -G1: Performed by: OBSTETRICS & GYNECOLOGY

## 2021-05-27 PROCEDURE — 77030040361 HC SLV COMPR DVT MDII -B: Performed by: OBSTETRICS & GYNECOLOGY

## 2021-05-27 PROCEDURE — 2709999900 HC NON-CHARGEABLE SUPPLY: Performed by: OBSTETRICS & GYNECOLOGY

## 2021-05-27 PROCEDURE — 36415 COLL VENOUS BLD VENIPUNCTURE: CPT

## 2021-05-27 PROCEDURE — 76060000032 HC ANESTHESIA 0.5 TO 1 HR: Performed by: OBSTETRICS & GYNECOLOGY

## 2021-05-27 RX ORDER — LIDOCAINE HYDROCHLORIDE 10 MG/ML
0.1 INJECTION, SOLUTION EPIDURAL; INFILTRATION; INTRACAUDAL; PERINEURAL AS NEEDED
Status: DISCONTINUED | OUTPATIENT
Start: 2021-05-27 | End: 2021-05-27 | Stop reason: HOSPADM

## 2021-05-27 RX ORDER — FENTANYL CITRATE 50 UG/ML
INJECTION, SOLUTION INTRAMUSCULAR; INTRAVENOUS AS NEEDED
Status: DISCONTINUED | OUTPATIENT
Start: 2021-05-27 | End: 2021-05-27 | Stop reason: HOSPADM

## 2021-05-27 RX ORDER — MIDAZOLAM HYDROCHLORIDE 1 MG/ML
0.5 INJECTION, SOLUTION INTRAMUSCULAR; INTRAVENOUS
Status: DISCONTINUED | OUTPATIENT
Start: 2021-05-27 | End: 2021-05-27 | Stop reason: HOSPADM

## 2021-05-27 RX ORDER — HYDROMORPHONE HYDROCHLORIDE 1 MG/ML
0.4 INJECTION, SOLUTION INTRAMUSCULAR; INTRAVENOUS; SUBCUTANEOUS
Status: DISCONTINUED | OUTPATIENT
Start: 2021-05-27 | End: 2021-05-27 | Stop reason: HOSPADM

## 2021-05-27 RX ORDER — HYDRALAZINE HYDROCHLORIDE 20 MG/ML
10 INJECTION INTRAMUSCULAR; INTRAVENOUS
Status: DISCONTINUED | OUTPATIENT
Start: 2021-05-27 | End: 2021-05-27 | Stop reason: HOSPADM

## 2021-05-27 RX ORDER — FENTANYL CITRATE 50 UG/ML
50 INJECTION, SOLUTION INTRAMUSCULAR; INTRAVENOUS AS NEEDED
Status: DISCONTINUED | OUTPATIENT
Start: 2021-05-27 | End: 2021-05-27 | Stop reason: HOSPADM

## 2021-05-27 RX ORDER — LABETALOL HCL 20 MG/4 ML
5 SYRINGE (ML) INTRAVENOUS
Status: DISCONTINUED | OUTPATIENT
Start: 2021-05-27 | End: 2021-05-27 | Stop reason: HOSPADM

## 2021-05-27 RX ORDER — NORETHINDRONE AND ETHINYL ESTRADIOL 0.5-0.035
5 KIT ORAL AS NEEDED
Status: DISCONTINUED | OUTPATIENT
Start: 2021-05-27 | End: 2021-05-27 | Stop reason: HOSPADM

## 2021-05-27 RX ORDER — ONDANSETRON 2 MG/ML
INJECTION INTRAMUSCULAR; INTRAVENOUS AS NEEDED
Status: DISCONTINUED | OUTPATIENT
Start: 2021-05-27 | End: 2021-05-27 | Stop reason: HOSPADM

## 2021-05-27 RX ORDER — OXYCODONE AND ACETAMINOPHEN 5; 325 MG/1; MG/1
1 TABLET ORAL
Qty: 20 TABLET | Refills: 0 | Status: SHIPPED | OUTPATIENT
Start: 2021-05-27 | End: 2021-06-10

## 2021-05-27 RX ORDER — DEXAMETHASONE SODIUM PHOSPHATE 4 MG/ML
INJECTION, SOLUTION INTRA-ARTICULAR; INTRALESIONAL; INTRAMUSCULAR; INTRAVENOUS; SOFT TISSUE AS NEEDED
Status: DISCONTINUED | OUTPATIENT
Start: 2021-05-27 | End: 2021-05-27 | Stop reason: HOSPADM

## 2021-05-27 RX ORDER — ONDANSETRON 2 MG/ML
4 INJECTION INTRAMUSCULAR; INTRAVENOUS AS NEEDED
Status: DISCONTINUED | OUTPATIENT
Start: 2021-05-27 | End: 2021-05-27 | Stop reason: HOSPADM

## 2021-05-27 RX ORDER — SODIUM CHLORIDE 0.9 % (FLUSH) 0.9 %
5-40 SYRINGE (ML) INJECTION EVERY 8 HOURS
Status: DISCONTINUED | OUTPATIENT
Start: 2021-05-27 | End: 2021-05-27 | Stop reason: HOSPADM

## 2021-05-27 RX ORDER — CEFAZOLIN SODIUM 1 G/3ML
INJECTION, POWDER, FOR SOLUTION INTRAMUSCULAR; INTRAVENOUS AS NEEDED
Status: DISCONTINUED | OUTPATIENT
Start: 2021-05-27 | End: 2021-05-27 | Stop reason: HOSPADM

## 2021-05-27 RX ORDER — FENTANYL CITRATE 50 UG/ML
50 INJECTION, SOLUTION INTRAMUSCULAR; INTRAVENOUS
Status: DISCONTINUED | OUTPATIENT
Start: 2021-05-27 | End: 2021-05-27 | Stop reason: HOSPADM

## 2021-05-27 RX ORDER — HYDROCODONE BITARTRATE AND ACETAMINOPHEN 5; 325 MG/1; MG/1
1 TABLET ORAL AS NEEDED
Status: DISCONTINUED | OUTPATIENT
Start: 2021-05-27 | End: 2021-05-27 | Stop reason: HOSPADM

## 2021-05-27 RX ORDER — MIDAZOLAM HYDROCHLORIDE 1 MG/ML
1 INJECTION, SOLUTION INTRAMUSCULAR; INTRAVENOUS AS NEEDED
Status: DISCONTINUED | OUTPATIENT
Start: 2021-05-27 | End: 2021-05-27 | Stop reason: HOSPADM

## 2021-05-27 RX ORDER — DIPHENHYDRAMINE HYDROCHLORIDE 50 MG/ML
12.5 INJECTION, SOLUTION INTRAMUSCULAR; INTRAVENOUS AS NEEDED
Status: DISCONTINUED | OUTPATIENT
Start: 2021-05-27 | End: 2021-05-27 | Stop reason: HOSPADM

## 2021-05-27 RX ORDER — METOPROLOL TARTRATE 5 MG/5ML
2.5 INJECTION INTRAVENOUS
Status: DISCONTINUED | OUTPATIENT
Start: 2021-05-27 | End: 2021-05-27 | Stop reason: HOSPADM

## 2021-05-27 RX ORDER — SODIUM CHLORIDE, SODIUM LACTATE, POTASSIUM CHLORIDE, CALCIUM CHLORIDE 600; 310; 30; 20 MG/100ML; MG/100ML; MG/100ML; MG/100ML
INJECTION, SOLUTION INTRAVENOUS
Status: DISCONTINUED | OUTPATIENT
Start: 2021-05-27 | End: 2021-05-27 | Stop reason: HOSPADM

## 2021-05-27 RX ORDER — LIDOCAINE HYDROCHLORIDE 20 MG/ML
INJECTION, SOLUTION EPIDURAL; INFILTRATION; INTRACAUDAL; PERINEURAL AS NEEDED
Status: DISCONTINUED | OUTPATIENT
Start: 2021-05-27 | End: 2021-05-27 | Stop reason: HOSPADM

## 2021-05-27 RX ORDER — SODIUM CHLORIDE, SODIUM LACTATE, POTASSIUM CHLORIDE, CALCIUM CHLORIDE 600; 310; 30; 20 MG/100ML; MG/100ML; MG/100ML; MG/100ML
20 INJECTION, SOLUTION INTRAVENOUS CONTINUOUS
Status: DISCONTINUED | OUTPATIENT
Start: 2021-05-27 | End: 2021-05-27 | Stop reason: HOSPADM

## 2021-05-27 RX ORDER — SODIUM CHLORIDE 0.9 % (FLUSH) 0.9 %
5-40 SYRINGE (ML) INJECTION AS NEEDED
Status: DISCONTINUED | OUTPATIENT
Start: 2021-05-27 | End: 2021-05-27 | Stop reason: HOSPADM

## 2021-05-27 RX ORDER — PROPOFOL 10 MG/ML
INJECTION, EMULSION INTRAVENOUS AS NEEDED
Status: DISCONTINUED | OUTPATIENT
Start: 2021-05-27 | End: 2021-05-27 | Stop reason: HOSPADM

## 2021-05-27 RX ADMIN — ONDANSETRON 4 MG: 2 INJECTION INTRAMUSCULAR; INTRAVENOUS at 09:31

## 2021-05-27 RX ADMIN — SODIUM CHLORIDE, POTASSIUM CHLORIDE, SODIUM LACTATE AND CALCIUM CHLORIDE: 600; 310; 30; 20 INJECTION, SOLUTION INTRAVENOUS at 09:31

## 2021-05-27 RX ADMIN — CEFAZOLIN SODIUM 2 G: 1 INJECTION, POWDER, FOR SOLUTION INTRAMUSCULAR; INTRAVENOUS at 09:31

## 2021-05-27 RX ADMIN — LIDOCAINE HYDROCHLORIDE 100 MG: 20 INJECTION, SOLUTION EPIDURAL; INFILTRATION; INTRACAUDAL; PERINEURAL at 09:31

## 2021-05-27 RX ADMIN — FENTANYL CITRATE 50 MCG: 50 INJECTION, SOLUTION INTRAMUSCULAR; INTRAVENOUS at 10:39

## 2021-05-27 RX ADMIN — PROPOFOL 200 MG: 10 INJECTION, EMULSION INTRAVENOUS at 09:31

## 2021-05-27 RX ADMIN — FENTANYL CITRATE 100 MCG: 50 INJECTION, SOLUTION INTRAMUSCULAR; INTRAVENOUS at 09:31

## 2021-05-27 RX ADMIN — FENTANYL CITRATE 50 MCG: 50 INJECTION, SOLUTION INTRAMUSCULAR; INTRAVENOUS at 10:34

## 2021-05-27 RX ADMIN — SODIUM CHLORIDE, POTASSIUM CHLORIDE, SODIUM LACTATE AND CALCIUM CHLORIDE 20 ML/HR: 600; 310; 30; 20 INJECTION, SOLUTION INTRAVENOUS at 08:35

## 2021-05-27 RX ADMIN — DEXAMETHASONE SODIUM PHOSPHATE 4 MG: 4 INJECTION, SOLUTION INTRA-ARTICULAR; INTRALESIONAL; INTRAMUSCULAR; INTRAVENOUS; SOFT TISSUE at 09:31

## 2021-05-27 NOTE — PROGRESS NOTES
DC instructions reviewed with pt. Verb an understanding. Pt with peripad  Scant drainage. Pt has no complaints.

## 2021-05-27 NOTE — OP NOTES
Operative Note    Patient: Candida Bowen MRN: [id number]  SSN: [id number]    YOB: 1990  Age: 27 y.o. Sex: female      Date of Surgery: 5/27/2021     Preoperative Diagnosis: FIBRIODS, MENORRHAGIA    Postoperative Diagnosis:  FIBRIODS, MENORRHAGIA    Surgeon(s) and Role:     Phil Chen MD - Primary     Anesthesia:  General     Procedure: Procedure(s): HYSTEROSCOPY WITH MYOSURE REMOVAL OF MYOMA WITH DILATION AND CURETTAGE     Indications: Menorrhagia, uterine fibroid       Discussed the risk of surgery including infection, hematoma, bleeding,  the risks of general anesthetic. The patient understands the risk that the procedure could be an open procedure. The patient understands the risks, any and all questions were answered to the patient's satisfaction and they freely signed the consent for operation. Procedure in Detail: The patient was taken to the operating room where she was placed in the supine position. After undergoing adequate general endotracheal anesthesia, she was placed up in the  laparoscopy stirrups in the dorsal lithotomy position. The patient was then prepped and draped in the usual fashion and the bladder drained. . Attention was first turned to the vagina where the speculum was placed in the vagina. The anterior lip of the cervix was grasped with a single-toothed tenaculum. The cervix was dilated up to admit a  hysteroscope using normal saline for distention media. The above findings were noted. The Myosure device introduced and above finding resected. Instruments removed.  Using a curet, a gentle curettage was performed and specimen was sent to pathology      Findings:  Polyp and fibroid     Estimated Blood Loss:  minimal  Specimens:   ID Type Source Tests Collected by Time Destination   1 : Marlys polyps, fibroid Preservative   Marlow Goodpasture, MD 5/27/2021 1033 Pathology   2 : Marlys polyps, fibroid Preservative   Marlow Goodpasture, MD 5/27/2021 3667 Pathology                                Implants: * No implants in log *

## 2021-05-27 NOTE — ANESTHESIA POSTPROCEDURE EVALUATION
Procedure(s): HYSTEROSCOPY WITH MYOSURE REMOVAL OF MYOMA WITH DILATION AND CURETTAGE.     general    Anesthesia Post Evaluation      Multimodal analgesia: multimodal analgesia used between 6 hours prior to anesthesia start to PACU discharge  Patient location during evaluation: PACU  Patient participation: complete - patient participated  Level of consciousness: awake  Pain score: 0  Pain management: adequate  Airway patency: patent  Anesthetic complications: no  Cardiovascular status: acceptable  Respiratory status: acceptable  Hydration status: acceptable  Post anesthesia nausea and vomiting:  controlled  Final Post Anesthesia Temperature Assessment:  Normothermia (36.0-37.5 degrees C)      INITIAL Post-op Vital signs:   Vitals Value Taken Time   /80 05/27/21 1027   Temp 36.5 °C (97.7 °F) 05/27/21 1027   Pulse 85 05/27/21 1027   Resp 20 05/27/21 1027   SpO2 99 % 05/27/21 1027

## 2021-05-27 NOTE — ANESTHESIA PREPROCEDURE EVALUATION
Relevant Problems   CARDIOVASCULAR   (+) Hypertension      ENDOCRINE   (+) Obesity, Class II, BMI 35-39.9, with comorbidity   (+) Severe obesity (BMI 35.0-39. 9) with comorbidity ([address]       Anesthetic History   No history of anesthetic complications            Review of Systems / Medical History  Patient summary reviewed, nursing notes reviewed and pertinent labs reviewed    Pulmonary  Within defined limits                 Neuro/Psych   Within defined limits           Cardiovascular    Hypertension: well controlled              Exercise tolerance: >4 METS  Comments: htn controlled without meds   GI/Hepatic/Renal  Within defined limits              Endo/Other        Morbid obesity and anemia     Other Findings          Past Medical History:   Diagnosis Date    Adverse effect of anesthesia     pt states it took \"at least 2 hours to wake up\" after last surgery    Hypercholesterolemia     Hypertension 1/28/2011    IUD (intrauterine device) in place 07/23/2015    removed 2015    LSIL (low grade squamous intraepithelial lesion) on Pap smear 12/4/12    Menorrhagia with regular cycle 7/2/2013    Obesity, Class II, BMI 35-39.9 9/23/13    BMI 37    Secondary dysmenorrhea 7/2/2013       Past Surgical History:   Procedure Laterality Date    HX COLPOSCOPY  2012    HX DILATION AND CURETTAGE  2008    pregnancy related    HX GASTRIC BYPASS      sleeve August 2020    HX OTHER SURGICAL  11/2013    laparoscopy/exploratory-uterine issues, checking endometriosis patient stated approx.  2012    HX WISDOM TEETH EXTRACTION      MO CONIZATION CERVIX,LOOP ELECTRD N/A 1/26/2018    LOOP ELECTRODE EXCISION PROCEDURE OF CERVIX (LEEP) performed by Marquis Chapis MD at [facility] MAIN OR       Current Outpatient Medications   Medication Instructions    ibuprofen (MOTRIN) 800 mg, Oral, AS NEEDED    multivitamin (ONE A DAY) tablet 1 Tablet, Oral, DAILY    traMADoL (ULTRAM) 50 mg, Oral, EVERY 6 HOURS AS NEEDED    Vitamin D2 1,250 mcg (50,000 unit) capsule Take 1 capsule by mouth once a week       Current Facility-Administered Medications   Medication Dose Route Frequency    lactated Ringers infusion  20 mL/hr IntraVENous CONTINUOUS    ceFAZolin (ANCEF) 2 g in sterile water (preservative free) 20 mL IV syringe  2 g IntraVENous ONCE    sodium chloride (NS) flush 5-40 mL  5-40 mL IntraVENous Q8H    sodium chloride (NS) flush 5-40 mL  5-40 mL IntraVENous PRN    lidocaine (PF) (XYLOCAINE) 10 mg/mL (1 %) injection 0.1 mL  0.1 mL SubCUTAneous PRN    fentaNYL citrate (PF) injection 50 mcg  50 mcg IntraVENous PRN    midazolam (VERSED) injection 1 mg  1 mg IntraVENous PRN       Patient Vitals for the past 24 hrs:   Temp Pulse Resp BP SpO2   05/27/21 0832 36.8 °C (98.2 °F) 78 16 135/82 100 %       Lab Results   Component Value Date/Time    WBC 4.8 05/18/2021 10:10 AM    HGB 10.0 (L) 05/18/2021 10:10 AM    HCT 32.1 (L) 05/18/2021 10:10 AM    PLATELET 639 74/92/2543 10:10 AM    MCV 79.5 (L) 05/18/2021 10:10 AM     Lab Results   Component Value Date/Time    Sodium 140 05/18/2021 10:10 AM    Potassium 4.1 05/18/2021 10:10 AM    Chloride 109 (H) 05/18/2021 10:10 AM    CO2 27 05/18/2021 10:10 AM    Anion gap 4 (L) 05/18/2021 10:10 AM    Glucose 78 05/18/2021 10:10 AM    BUN 10 05/18/2021 10:10 AM    Creatinine 0.85 05/18/2021 10:10 AM    BUN/Creatinine ratio 12 05/18/2021 10:10 AM    GFR est AA >60 05/18/2021 10:10 AM    GFR est non-AA >60 05/18/2021 10:10 AM    Calcium 9.2 05/18/2021 10:10 AM     No results found for: APTT, PTP, INR, INREXT  Lab Results   Component Value Date/Time    Glucose 78 05/18/2021 10:10 AM     Physical Exam    Airway  Mallampati: I  TM Distance: 4 - 6 cm  Neck ROM: normal range of motion   Mouth opening: Normal     Cardiovascular    Rhythm: regular  Rate: normal         Dental  No notable dental hx       Pulmonary  Breath sounds clear to auscultation               Abdominal  GI exam deferred       Other Findings Anesthetic Plan    ASA: 2  Anesthesia type: general          Induction: Intravenous  Anesthetic plan and risks discussed with: Patient

## 2021-05-27 NOTE — DISCHARGE INSTRUCTIONS
Patient Education   Patient Education        Learning About Anesthesia  What is anesthesia? Anesthesia controls pain. And it keeps all your organs working normally during surgery or another kind of procedure. Anesthesia can relax you. It can also make you sleepy or forgetful. Or it may make you unconscious. It depends on what kind you get. Your anesthesia provider (anesthesiologist or nurse anesthetist) will make sure you are comfortable and safe during the procedure or surgery. There are different types of anesthesia. · Local anesthesia. This type numbs a small part of the body. Doctors use it for simple procedures. ? You get a shot in the area the doctor will work on.  ? You will feel some pressure during the procedure. ? You may stay awake. Or you may get medicine to help you relax or sleep. · Regional anesthesia. This type blocks pain to a larger area of the body. It can also help relieve pain right after surgery. And it may reduce your need for other pain medicine after surgery. There are different types. They include:  ? Peripheral nerve block. This is a shot near a specific nerve or group of nerves. It blocks pain in the part of the body supplied by the nerve. This is often used for procedures on the hands, arms, feet, legs, or face. ? Epidural and spinal anesthesia. This is a shot near the spinal cord and the nerves around it. It blocks pain from an entire area of the body, such as the belly, hips, or legs. · General anesthesia. This type affects the brain and the whole body. You may get it through a small tube placed in a vein (IV). Or you may breathe it in. You are unconscious and will not feel pain. During the surgery, you will be comfortable. Later, you will not remember much about the surgery. What type will you have? The type of anesthesia you have depends on many things, such as:  · The type of surgery or procedure and the reason you are having it.   · Test results, such as blood tests.  · How worried you feel about the surgery. · Your health. Your doctor and nurses will ask you about any past surgeries. They will ask about any health problems you may have, such as diabetes, lung or heart disease, or a history of stroke. They will want to know if you take medicine, such as blood thinners. Your doctor may also ask if any family members have had any problems with anesthesia. You will talk with your anesthesia provider about your options. In many cases, you may be able to choose the type of anesthesia you have. What are the risks of anesthesia? Major side effects are not common. But all types of anesthesia have some risk. Your risk depends on your overall health. It also depends on the type of anesthesia you have and how you respond to it. Serious but rare risks include breathing problems, heart attack, stroke, and reaction to the medicine. Some health conditions increase the risk of problems. Your anesthesia provider will find out about any health problems you have that may affect your care. Your anesthesia provider will closely watch your vital signs during anesthesia and surgery. This includes checking your blood pressure and heart rate. This may help you avoid problems from anesthesia. What can you do to prepare? You will get a list of instructions to help you prepare. Your doctor will let you know what to expect when you get to the hospital, during the surgery, and after. You will get instructions about when to stop eating and drinking. If you take medicine, you will get instructions about what you can and can't take before surgery. You will be asked to sign a consent form that says you understand the risks of anesthesia. Before you do, your anesthesia provider will talk with you about the best type for you and the risks and benefits of that type. Many people are nervous before they have anesthesia and surgery. Ask your doctor about ways to relax before surgery.  These may include relaxation exercises or medicine. What can you expect after having anesthesia? Right after the surgery, you will be in the recovery room. Nurses will make sure you are comfortable. As the anesthesia wears off, you may feel some pain and discomfort from your surgery. Tell someone if you have pain. Pain medicine works better if you take it before the pain gets bad. You may feel some of the effects of anesthesia for a while. It takes time for the effects of the medicine to completely wear off. · If you had local or regional anesthesia, you may feel numb and have less feeling in part of your body. It may also take a few hours for you to be able to move and control your muscles as usual.  · When you first wake up from general anesthesia, you may be confused. Or it may be hard to think clearly. This is normal.  · If you had medicine to help you relax (sedation) or were asleep during surgery, wait 24 hours before you drive or operate heavy machinery, make important decisions, go to work or school, or sign legal documents. Other common side effects of anesthesia include:  · Nausea and vomiting. This does not usually last long. It can be treated with medicine. · A slight drop in body temperature. You may feel cold and shiver when you first wake up. · A sore throat, if you had general anesthesia. · Muscle aches or weakness. · Feeling tired. For minor surgeries, you may go home the same day. For other surgeries, you may stay in the hospital. Your doctor will check on your recovery from the anesthesia. Your doctor will answer any questions you may have. Follow-up care is a key part of your treatment and safety. Be sure to make and go to all appointments, and call your doctor if you are having problems. It's also a good idea to know your test results and keep a list of the medicines you take. Where can you learn more?   Go to http://www.gray.com/  Enter V817 in the search box to learn more about \"Learning About Anesthesia. \"  Current as of: May 27, 2020               Content Version: 12.8  © 2006-2021 [employer]. Care instructions adapted under license by [employer] (which disclaims liability or warranty for this information). If you have questions about a medical condition or this instruction, always ask your healthcare professional. [address] any warranty or liability for your use of this information. Operative Hysteroscopy: What to Expect at [address] [facility]     An operative hysteroscopy is a procedure to find and treat problems with your uterus. It may have been done to remove growths from the uterus. Or it may have been done to treat fertility problems or abnormal bleeding. You may have cramps and vaginal bleeding for several days. If the doctor filled your uterus with air during the procedure, you may have gas pains, a feeling of fullness in your belly, or shoulder pain. These symptoms usually go away in 1 or 2 days. If the doctor filled your uterus with liquid during the procedure, you may have watery vaginal discharge for a few days. Many women are able to return to work on the day after the procedure. But it depends on what was done during the procedure and the type of work you do. This care sheet gives you a general idea about how long it will take for you to recover. But each person recovers at a different pace. Follow the steps below to get better as quickly as possible. How can you care for yourself at home? Activity    · Rest when you feel tired. Getting enough sleep will help you recover.     · Most women are able to return to work on the day after the procedure.     · You may shower and take baths as usual.     · Ask your doctor when it is okay for you to have sex.     · Talk about birth control with your doctor. Do not try to become pregnant until your doctor says it is okay.    Diet    · You can eat your normal diet. If your stomach is upset, try bland, low-fat foods like plain rice, broiled chicken, toast, and yogurt.     · You may notice that your bowel movements are not regular right after the procedure. This is common. Try to avoid constipation and straining with bowel movements. You may want to take a fiber supplement every day. If you have not had a bowel movement after a couple of days, ask your doctor about taking a mild laxative. Medicines    · Your doctor will tell you if and when you can restart your medicines. He or she will also give you instructions about taking any new medicines.     · If you take aspirin or some other blood thinner, ask your doctor if and when to start taking it again. Make sure that you understand exactly what your doctor wants you to do.     · Be safe with medicines. Take pain medicines exactly as directed. ? If the doctor gave you a prescription medicine for pain, take it as prescribed. ? If you are not taking a prescription pain medicine, ask your doctor if you can take an over-the-counter medicine. ? Do not take two or more pain medicines at the same time unless the doctor told you to. Many pain medicines have acetaminophen, which is Tylenol. Too much acetaminophen (Tylenol) can be harmful.     · If you think your pain medicine is making you sick to your stomach:  ? Take your medicine after meals (unless your doctor has told you not to). ? Ask your doctor for a different pain medicine.     · If your doctor prescribed antibiotics, take them as directed. Do not stop taking them just because you feel better. You need to take the full course of antibiotics. Other instructions    · You may have some light vaginal bleeding. Wear sanitary pads if needed. Do not douche or use tampons until your doctor says it is okay.     · You may want to use a heating pad on your belly to help with pain. Use a low heat setting. Follow-up care is a key part of your treatment and safety.  Be sure to make and go to all appointments, and call your doctor if you are having problems. It's also a good idea to know your test results and keep a list of the medicines you take. When should you call for help? Call 911 anytime you think you may need emergency care. For example, call if:    · You pass out (lose consciousness).     · You have chest pain, are short of breath, or cough up blood. Call your doctor now or seek immediate medical care if:    · You have bright red vaginal bleeding that soaks one or more pads in an hour, or you have large clots.     · You have vaginal discharge that has increased in amount or smells bad.     · You are sick to your stomach or cannot drink fluids.     · You have pain that does not get better after you take pain medicine.     · You have signs of infection, such as:  ? Increased pain, swelling, warmth, or redness. ? A fever.     · You cannot pass stools or gas.     · You have signs of a blood clot in your leg (called a deep vein thrombosis), such as:  ? Pain in your calf, back of the knee, thigh, or groin. ? Redness and swelling in your leg. Watch closely for changes in your health, and be sure to contact your doctor if you have any problems. Where can you learn more? Go to http://www.gray.com/  Enter J320 in the search box to learn more about \"Operative Hysteroscopy: What to Expect at Home. \"  Current as of: July 17, 2020               Content Version: 12.8  © 2006-2021 [employer]. Care instructions adapted under license by [employer] (which disclaims liability or warranty for this information). If you have questions about a medical condition or this instruction, always ask your healthcare professional. [place] [place] any warranty or liability for your use of this information.

## 2021-06-11 ENCOUNTER — OFFICE VISIT (OUTPATIENT)
Dept: OBGYN CLINIC | Age: 31
End: 2021-06-11
Payer: COMMERCIAL

## 2021-06-11 VITALS
WEIGHT: 198 LBS | HEIGHT: 67 IN | DIASTOLIC BLOOD PRESSURE: 85 MMHG | HEART RATE: 85 BPM | SYSTOLIC BLOOD PRESSURE: 143 MMHG | BODY MASS INDEX: 31.08 KG/M2 | OXYGEN SATURATION: 98 %

## 2021-06-11 DIAGNOSIS — N76.0 VAGINITIS AND VULVOVAGINITIS: Primary | ICD-10-CM

## 2021-06-11 PROCEDURE — 99213 OFFICE O/P EST LOW 20 MIN: CPT | Performed by: OBSTETRICS & GYNECOLOGY

## 2021-06-11 RX ORDER — FLUCONAZOLE 150 MG/1
150 TABLET ORAL DAILY
Qty: 1 TABLET | Refills: 0 | Status: SHIPPED | OUTPATIENT
Start: 2021-06-11 | End: 2021-06-12

## 2021-06-11 NOTE — PROGRESS NOTES
Yi Mera is a 27 y.o. female, , Patient's last menstrual period was 2021., who presents today for the following:  discharge     Allergies   Allergen Reactions    Apple Other (comments)     Patient stated causes itchy throat    Lisinopril Swelling     Lips swell       Current Outpatient Medications   Medication Sig    fluconazole (DIFLUCAN) 150 mg tablet Take 1 Tablet by mouth daily for 1 day. FDA advises cautious prescribing of oral fluconazole in pregnancy.  multivitamin (ONE A DAY) tablet Take 1 Tab by mouth daily. (Patient not taking: Reported on 2021)    Vitamin D2 1,250 mcg (50,000 unit) capsule Take 1 capsule by mouth once a week (Patient not taking: Reported on 2021)    traMADol (ULTRAM) 50 mg tablet Take 1 Tab by mouth every six (6) hours as needed for Pain. Max Daily Amount: 200 mg. (Patient not taking: Reported on 2021)    ibuprofen (MOTRIN) 800 mg tablet Take 1 Tab by mouth as needed for Pain. (Patient not taking: Reported on 2021)     No current facility-administered medications for this visit. Past Medical History:   Diagnosis Date    Adverse effect of anesthesia     pt states it took \"at least 2 hours to wake up\" after last surgery    Hypercholesterolemia     Hypertension 2011    IUD (intrauterine device) in place 2015    removed     LSIL (low grade squamous intraepithelial lesion) on Pap smear 12    Menorrhagia with regular cycle 2013    Obesity, Class II, BMI 35-39.9 13    BMI 37    Secondary dysmenorrhea 2013       Past Surgical History:   Procedure Laterality Date    HX COLPOSCOPY      HX DILATION AND CURETTAGE      pregnancy related    HX GASTRIC BYPASS      sleeve 2020    HX OTHER SURGICAL  2013    laparoscopy/exploratory-uterine issues, checking endometriosis patient stated approx.      HX WISDOM TEETH EXTRACTION      NE CONIZATION CERVIX,LOOP ELECTRD N/A 2018 LOOP ELECTRODE EXCISION PROCEDURE OF CERVIX (LEEP) performed by Odalys Harley MD at [address]       Family History   Problem Relation Age of Onset    Hypertension Mother     Hypertension Father     Cancer Father         prostate    Hypertension Sister     Hypertension Maternal Aunt     Diabetes Maternal Grandmother     Hypertension Maternal Grandmother     Diabetes Maternal Grandfather     Hypertension Maternal Grandfather     Diabetes Paternal Grandmother     Hypertension Paternal Grandmother     Diabetes Paternal Grandfather     Hypertension Paternal Grandfather        Social History     Socioeconomic History    Marital status: SINGLE     Spouse name: Not on file    Number of children: Not on file    Years of education: Not on file    Highest education level: Not on file   Occupational History    Not on file   Tobacco Use    Smoking status: Never Smoker    Smokeless tobacco: Never Used   Substance and Sexual Activity    Alcohol use: Yes     Alcohol/week: 1.0 standard drinks     Types: 1 Shots of liquor per week     Comment: on special occasions    Drug use: No    Sexual activity: Yes     Partners: Male     Birth control/protection: None     Comment: Pill use began in July 2013. No other history of hormonal contraceptive use   Other Topics Concern    Not on file   Social History Narrative    Not on file     Social Determinants of Health     Financial Resource Strain:     Difficulty of Paying Living Expenses:    Food Insecurity:     Worried About Running Out of Food in the Last Year:     [address] in the Last Year:    Transportation Needs:     Lack of Transportation (Medical):      Lack of Transportation (Non-Medical):    Physical Activity:     Days of Exercise per Week:     Minutes of Exercise per Session:    Stress:     Feeling of Stress :    Social Connections:     Frequency of Communication with Friends and Family:     Frequency of Social Gatherings with Friends and Family:     Attends Mu-ism Services:     Active Member of Clubs or Organizations:     Attends Club or Organization Meetings:     Marital Status:    Intimate Partner Violence:     Fear of Current or Ex-Partner:     Emotionally Abused:     Physically Abused:     Sexually Abused:          HPI  Vaginal Discharge   Reported by patient. Location: vagina   Quality: vaginal discharge  Severity: moderate   Duration: symptoms lasting over 2 weeks   Context: history of recurrent vaginal infections   Associated Symptoms: no vaginal burning; no swelling/redness; no fever/chills; no diarrhea; no abdominal pain; no pelvic pain; no vaginal pain; no pain during urination; no pain during intercourse; no vaginal lump; no genital lesion; no sexually transmitted disease; no fever; vaginal itching   S/p operative hysteroscopy with myosure secondary to submucosal fibroid. Did well following procedure. Cycle started week after cycles. Review of Systems   Constitutional: Negative. Respiratory: Negative. Cardiovascular: Negative. Gastrointestinal: Negative. Genitourinary: Negative. Musculoskeletal: Negative. Skin: Negative. Neurological: Negative. Endo/Heme/Allergies: Negative. Psychiatric/Behavioral: Negative. All other systems reviewed and are negative. BP (!) 143/85 (BP 1 Location: Right arm, BP Patient Position: Sitting)   Pulse 85   Ht 5' 7\" (1.702 m)   Wt 198 lb (89.8 kg)   LMP 06/02/2021   SpO2 98%   BMI 31.01 kg/m²    OBGyn Exam   PE:  Constitutional: General Appearance: healthy-appearing, well-nourished, well-developed, and well groomed. Psychiatric: Orientation: to time, place, and person. Mood and Affect: normal mood and affect and appropriate and active and alert. Abdomen: Auscultation/Inspection/Palpation: tenderness and mass and non-distended. Hernia: none palpated. Female Genitalia: Vulva: no masses, atrophy, or lesions.     Bladder/Urethra: no urethral discharge or mass and normal meatus and bladder non distended. Vagina no tenderness, erythema, cystocele, rectocele, abnormal vaginal discharge, or vesicle(s) or ulcers. Cervix: no discharge or cervical motion tenderness and grossly normal.     Uterus: mobile, non-tender, and no uterine prolapse. Adnexa/Parametria: no adnexal tenderness. 1. Vaginitis and vulvovaginitis    - fluconazole (DIFLUCAN) 150 mg tablet; Take 1 Tablet by mouth daily for 1 day. FDA advises cautious prescribing of oral fluconazole in pregnancy. Dispense: 1 Tablet; Refill: 0        Follow-up and Dispositions    · Return in about 2 months (around 8/11/2021).

## 2021-06-11 NOTE — PATIENT INSTRUCTIONS
Vaginal Yeast Infection: Care Instructions  Overview     A vaginal yeast infection is the growth of too many yeast cells in the vagina. This is common in women of all ages. Itching, vaginal discharge and irritation, and other symptoms can bother you. But yeast infections don't often cause other health problems. Some medicines can increase your risk of getting a yeast infection. These include antibiotics, birth control pills, hormones, and steroids. You may also be more likely to get a yeast infection if you are pregnant, have diabetes, douche, or wear tight clothes. With treatment, most yeast infections get better in 2 to 3 days. Follow-up care is a key part of your treatment and safety. Be sure to make and go to all appointments, and call your doctor if you are having problems. It's also a good idea to know your test results and keep a list of the medicines you take. How can you care for yourself at home? · Take your medicines exactly as prescribed. Call your doctor if you think you are having a problem with your medicine. · Ask your doctor about over-the-counter (OTC) medicines for yeast infections. They may cost less than prescription medicines. If you use an OTC treatment, read and follow all instructions on the label. · Don't use tampons while using a vaginal cream or suppository. The tampons can absorb the medicine. Use pads instead. · Wear loose cotton clothing. Don't wear nylon or other fabric that holds body heat and moisture close to the skin. · Try sleeping without underwear. · Don't scratch. Relieve itching with a cold pack or a cool bath. · Don't wash your vaginal area more than once a day. Use plain water or a mild, unscented soap. Air-dry the vaginal area. · Change out of wet swimsuits after swimming. · If you are using a vaginal medicine, don't have sex until you have finished your treatment. But if you do have sex, don't depend on a condom or diaphragm for birth control.  The oil in some vaginal medicines weakens latex. · Don't douche. When should you call for help? Call your doctor now or seek immediate medical care if:    · You have unexpected vaginal bleeding.     · You have new or increased pain in your vagina or pelvis. Watch closely for changes in your health, and be sure to contact your doctor if:    · You have a fever.     · You are not getting better after 2 days.     · Your symptoms come back after you finish your medicines. Where can you learn more? Go to http://www.gray.com/  Enter D356 in the search box to learn more about \"Vaginal Yeast Infection: Care Instructions. \"  Current as of: July 17, 2020               Content Version: 12.8  © 2006-2021 Healthwise, [employer]. Care instructions adapted under license by [employer] (which disclaims liability or warranty for this information). If you have questions about a medical condition or this instruction, always ask your healthcare professional. [address] any warranty or liability for your use of this information.

## 2021-06-20 ENCOUNTER — APPOINTMENT (OUTPATIENT)
Dept: GENERAL RADIOLOGY | Age: 31
End: 2021-06-20
Attending: STUDENT IN AN ORGANIZED HEALTH CARE EDUCATION/TRAINING PROGRAM
Payer: COMMERCIAL

## 2021-06-20 ENCOUNTER — HOSPITAL ENCOUNTER (EMERGENCY)
Age: 31
Discharge: HOME | End: 2021-06-20
Attending: STUDENT IN AN ORGANIZED HEALTH CARE EDUCATION/TRAINING PROGRAM
Payer: COMMERCIAL

## 2021-06-20 VITALS
SYSTOLIC BLOOD PRESSURE: 138 MMHG | HEART RATE: 78 BPM | DIASTOLIC BLOOD PRESSURE: 85 MMHG | OXYGEN SATURATION: 99 % | TEMPERATURE: 98.5 F | WEIGHT: 195.77 LBS | HEIGHT: 67 IN | RESPIRATION RATE: 16 BRPM | BODY MASS INDEX: 30.73 KG/M2

## 2021-06-20 DIAGNOSIS — S16.1XXA STRAIN OF NECK MUSCLE, INITIAL ENCOUNTER: ICD-10-CM

## 2021-06-20 DIAGNOSIS — S46.812A STRAIN OF LEFT TRAPEZIUS MUSCLE, INITIAL ENCOUNTER: ICD-10-CM
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
## 2021-06-20 PROCEDURE — 99283 EMERGENCY DEPT VISIT LOW MDM: CPT

## 2021-06-20 PROCEDURE — 74011250637 HC RX REV CODE- 250/637: Performed by: STUDENT IN AN ORGANIZED HEALTH CARE EDUCATION/TRAINING PROGRAM

## 2021-06-20 PROCEDURE — 96372 THER/PROPH/DIAG INJ SC/IM: CPT

## 2021-06-20 PROCEDURE — 74011250636 HC RX REV CODE- 250/636: Performed by: STUDENT IN AN ORGANIZED HEALTH CARE EDUCATION/TRAINING PROGRAM

## 2021-06-20 PROCEDURE — 72050 X-RAY EXAM NECK SPINE 4/5VWS: CPT

## 2021-06-20 RX ORDER — KETOROLAC TROMETHAMINE 30 MG/ML
30 INJECTION, SOLUTION INTRAMUSCULAR; INTRAVENOUS
Status: COMPLETED | OUTPATIENT
Start: 2021-06-20 | End: 2021-06-20

## 2021-06-20 RX ORDER — NAPROXEN 500 MG/1
500 TABLET ORAL 2 TIMES DAILY WITH MEALS
Qty: 20 TABLET | Refills: 0 | Status: SHIPPED | OUTPATIENT
Start: 2021-06-20 | End: 2021-06-30

## 2021-06-20 RX ORDER — METHOCARBAMOL 500 MG/1
500 TABLET, FILM COATED ORAL 4 TIMES DAILY
Qty: 20 TABLET | Refills: 0 | Status: SHIPPED | OUTPATIENT
Start: 2021-06-20 | End: 2021-06-25

## 2021-06-20 RX ORDER — METHOCARBAMOL 500 MG/1
750 TABLET, FILM COATED ORAL ONCE
Status: COMPLETED | OUTPATIENT
Start: 2021-06-20 | End: 2021-06-20

## 2021-06-20 RX ADMIN — METHOCARBAMOL TABLETS 750 MG: 500 TABLET, COATED ORAL at 16:03

## 2021-06-20 RX ADMIN — KETOROLAC TROMETHAMINE 30 MG: 30 INJECTION, SOLUTION INTRAMUSCULAR; INTRAVENOUS at 16:04
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
## 2021-07-02 ENCOUNTER — VIRTUAL VISIT (OUTPATIENT)
Dept: FAMILY MEDICINE CLINIC | Age: 31
End: 2021-07-02
Payer: COMMERCIAL

## 2021-07-02 DIAGNOSIS — S46.812A TRAPEZIUS STRAIN, LEFT, INITIAL ENCOUNTER: Primary | ICD-10-CM

## 2021-07-02 PROCEDURE — 99213 OFFICE O/P EST LOW 20 MIN: CPT | Performed by: FAMILY MEDICINE
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
## 2021-07-06 DIAGNOSIS — E55.9 VITAMIN D DEFICIENCY: Primary | ICD-10-CM

## 2021-07-06 DIAGNOSIS — D50.8 OTHER IRON DEFICIENCY ANEMIA: ICD-10-CM

## 2021-07-06 RX ORDER — LANOLIN ALCOHOL/MO/W.PET/CERES
325 CREAM (GRAM) TOPICAL
Qty: 90 TABLET | Refills: 0 | Status: SHIPPED | OUTPATIENT
Start: 2021-07-06 | End: 2021-07-27 | Stop reason: ALTCHOICE

## 2021-07-06 RX ORDER — ERGOCALCIFEROL 1.25 MG/1
CAPSULE ORAL
Qty: 12 CAPSULE | Refills: 0 | Status: SHIPPED | OUTPATIENT
Start: 2021-07-06 | End: 2021-07-27

## 2021-07-06 NOTE — PROGRESS NOTES
Labs from [facility] lab show low iron and low vit D  I sent rx for iron and one for vit D

## 2021-07-07 ENCOUNTER — TELEPHONE (OUTPATIENT)
Dept: FAMILY MEDICINE CLINIC | Age: 31
End: 2021-07-07

## 2021-07-07 NOTE — TELEPHONE ENCOUNTER
Left message on vm to call office back. LM Labs from [facility] lab show low iron and low vit D   I sent rx for iron and one for vit D     1st attempt.

## 2021-07-27 ENCOUNTER — OFFICE VISIT (OUTPATIENT)
Dept: OBGYN CLINIC | Age: 31
End: 2021-07-27
Payer: COMMERCIAL

## 2021-07-27 VITALS
RESPIRATION RATE: 16 BRPM | SYSTOLIC BLOOD PRESSURE: 122 MMHG | BODY MASS INDEX: 30.29 KG/M2 | HEIGHT: 67 IN | DIASTOLIC BLOOD PRESSURE: 74 MMHG | HEART RATE: 93 BPM | WEIGHT: 193 LBS | OXYGEN SATURATION: 98 %

## 2021-07-27 DIAGNOSIS — N92.0 MENORRHAGIA WITH REGULAR CYCLE: Primary | ICD-10-CM

## 2021-07-27 PROCEDURE — 99213 OFFICE O/P EST LOW 20 MIN: CPT | Performed by: OBSTETRICS & GYNECOLOGY

## 2021-07-27 RX ORDER — NORGESTIMATE AND ETHINYL ESTRADIOL 0.25-0.035
1 KIT ORAL DAILY
Qty: 1 DOSE PACK | Refills: 11 | Status: SHIPPED | OUTPATIENT
Start: 2021-07-27

## 2021-07-28 NOTE — PROGRESS NOTES
Melida Feliz is a 27 y.o. female, , Patient's last menstrual period was 2021., who presents today for the following:  Chief Complaint   Patient presents with    Follow-up     Pt c/o clots coming out on yesterday. Allergies   Allergen Reactions    Apple Other (comments)     Patient stated causes itchy throat    Lisinopril Swelling     Lips swell       Current Outpatient Medications   Medication Sig    norgestimate-ethinyl estradioL (ORTHO-CYCLEN, SPRINTEC) 0.25-35 mg-mcg tab Take 1 Tablet by mouth daily.  multivitamin (ONE A DAY) tablet Take 1 Tab by mouth daily. (Patient not taking: Reported on 2021)     No current facility-administered medications for this visit. Past Medical History:   Diagnosis Date    Adverse effect of anesthesia     pt states it took \"at least 2 hours to wake up\" after last surgery    Hypercholesterolemia     Hypertension 2011    IUD (intrauterine device) in place 2015    removed     LSIL (low grade squamous intraepithelial lesion) on Pap smear 12    Menorrhagia with regular cycle 2013    Obesity, Class II, BMI 35-39.9 13    BMI 37    Secondary dysmenorrhea 2013       Past Surgical History:   Procedure Laterality Date    HX COLPOSCOPY      HX DILATION AND CURETTAGE      pregnancy related    HX GASTRIC BYPASS      sleeve 2020    HX OTHER SURGICAL  2013    laparoscopy/exploratory-uterine issues, checking endometriosis patient stated approx.      HX WISDOM TEETH EXTRACTION      OK CONIZATION CERVIX,LOOP ELECTRD N/A 2018    LOOP ELECTRODE EXCISION PROCEDURE OF CERVIX (LEEP) performed by Wei Lopez MD at [facility] MAIN OR       Family History   Problem Relation Age of Onset    Hypertension Mother     Hypertension Father     Cancer Father         prostate    Hypertension Sister     Hypertension Maternal Aunt     Diabetes Maternal Grandmother     Hypertension Maternal Grandmother     Diabetes Maternal Grandfather     Hypertension Maternal Grandfather     Diabetes Paternal Grandmother     Hypertension Paternal Grandmother     Diabetes Paternal Grandfather     Hypertension Paternal Grandfather        Social History     Socioeconomic History    Marital status: SINGLE     Spouse name: Not on file    Number of children: Not on file    Years of education: Not on file    Highest education level: Not on file   Occupational History    Not on file   Tobacco Use    Smoking status: Never Smoker    Smokeless tobacco: Never Used   Substance and Sexual Activity    Alcohol use: Yes     Alcohol/week: 1.0 standard drinks     Types: 1 Shots of liquor per week     Comment: on special occasions    Drug use: No    Sexual activity: Yes     Partners: Male     Birth control/protection: None     Comment: Pill use began in July 2013. No other history of hormonal contraceptive use   Other Topics Concern    Not on file   Social History Narrative    Not on file     Social Determinants of Health     Financial Resource Strain:     Difficulty of Paying Living Expenses:    Food Insecurity:     Worried About Running Out of Food in the Last Year:     [address] in the Last Year:    Transportation Needs:     Lack of Transportation (Medical):  Lack of Transportation (Non-Medical):    Physical Activity:     Days of Exercise per Week:     Minutes of Exercise per Session:    Stress:     Feeling of Stress :    Social Connections:     Frequency of Communication with Friends and Family:     Frequency of Social Gatherings with Friends and Family:     Attends [employer] Services:     Active Member of Clubs or Organizations:     Attends Club or Organization Meetings:     Marital Status:    Intimate Partner Violence:     Fear of Current or Ex-Partner:     Emotionally Abused:     Physically Abused:     Sexually Abused:          HPI  Abnormal Bleeding   Reported by patient. Onset/Timing: current cycle  Duration: Day 2  Quality: heavy; passing clots   Severity: changing pad/tampon every 1-2 hours; requires double protection; interferes with daily activities; requires getting up at night; bleeding through onto clothes/sheets   Context: history of gyn surgery:;-hysteroscopy  Associated Symptoms: no abdominal pain; no dyspareunia; no fatigue; no dizziness; no anemia/iron supplements; no shortness of breath; no chest pain; no palpations; no bloating; no change in bowel function; no urinary symptoms; no PMS; no vaginal discharge; no vaginal itching/irritation; dysmenorrhea; pelvic pain     Review of Systems   Constitutional: Negative. Respiratory: Negative. Cardiovascular: Negative. Gastrointestinal: Negative. Genitourinary: Negative. Musculoskeletal: Negative. Skin: Negative. Neurological: Negative. Endo/Heme/Allergies: Negative. Psychiatric/Behavioral: Negative. All other systems reviewed and are negative. /74 (BP 1 Location: Right arm, BP Patient Position: Sitting)   Pulse 93   Resp 16   Ht 5' 7\" (1.702 m)   Wt 193 lb (87.5 kg)   LMP 07/25/2021   SpO2 98%   BMI 30.23 kg/m²    OBGyn Exam   PE:  Constitutional: General Appearance: healthy-appearing, well-nourished, well-developed, and well groomed. Psychiatric: Orientation: to time, place, and person. Mood and Affect: normal mood and affect and appropriate and active and alert. Abdomen: Auscultation/Inspection/Palpation: tenderness and mass and non-distended. Hernia: none palpated. Female Genitalia: Vulva: no masses, atrophy, or lesions. Bladder/Urethra: no urethral discharge or mass and normal meatus and bladder non distended. Vagina no tenderness, erythema, cystocele, rectocele, abnormal vaginal discharge, or vesicle(s) or ulcers.  minimal menstrual blood present    Cervix: no discharge or cervical motion tenderness and grossly normal.     Uterus: mobile, non-tender, and no uterine prolapse. Adnexa/Parametria: no adnexal tenderness. 1. Menorrhagia with regular cycle    - norgestimate-ethinyl estradioL (Ezequiel Nubia) 0.25-35 mg-mcg tab; Take 1 Tablet by mouth daily. Dispense: 1 Dose Pack; Refill: 11        Follow-up and Dispositions    · Return in about 2 months (around 9/27/2021) for gyn.

## 2021-07-30 ENCOUNTER — TELEPHONE (OUTPATIENT)
Dept: FAMILY MEDICINE CLINIC | Age: 31
End: 2021-07-30

## 2021-07-30 NOTE — TELEPHONE ENCOUNTER
[facility] form reviewed and signed by Dr. Poppy Sargent sent via fax.     Fax#: [phone]  File#: [id number]

## 2021-10-20 ENCOUNTER — TELEPHONE (OUTPATIENT)
Dept: FAMILY MEDICINE CLINIC | Age: 31
End: 2021-10-20

## 2021-10-20 NOTE — TELEPHONE ENCOUNTER
Received call from pt,  Requesting antibiotic for congestion x 2 weeks, does not want to establish care, requesting only a sick visit appt. Advised pt to call her pcp, Dr Rach Caballero.   Pt stated understanding

## 2021-10-21 ENCOUNTER — OFFICE VISIT (OUTPATIENT)
Dept: INTERNAL MEDICINE CLINIC | Age: 31
End: 2021-10-21

## 2021-10-21 ENCOUNTER — VIRTUAL VISIT (OUTPATIENT)
Dept: FAMILY MEDICINE CLINIC | Age: 31
End: 2021-10-21
Payer: COMMERCIAL

## 2021-10-21 VITALS — TEMPERATURE: 97.3 F | HEART RATE: 69 BPM | OXYGEN SATURATION: 98 %

## 2021-10-21 DIAGNOSIS — R05.9 COUGH: Primary | ICD-10-CM

## 2021-10-21 DIAGNOSIS — Z20.822 PERSON UNDER INVESTIGATION FOR COVID-19: ICD-10-CM

## 2021-10-21 PROCEDURE — 99213 OFFICE O/P EST LOW 20 MIN: CPT | Performed by: PHYSICIAN ASSISTANT

## 2021-10-21 PROCEDURE — 99213 OFFICE O/P EST LOW 20 MIN: CPT | Performed by: FAMILY MEDICINE

## 2021-10-21 RX ORDER — CODEINE PHOSPHATE AND GUAIFENESIN 10; 100 MG/5ML; MG/5ML
5 SOLUTION ORAL
Qty: 45 ML | Refills: 0 | Status: SHIPPED | OUTPATIENT
Start: 2021-10-21 | End: 2021-10-24

## 2021-10-21 NOTE — PROGRESS NOTES
Katarina Rajan is a 27 y.o. female who was seen by synchronous (real-time) audio-video technology on 10/21/2021 for Sinus Infection (x 10 days )    She has cough and congestion for 10 days  She is producing white phlegm  The mucus that comes from the nose is also clear  No fever  She is fully vaccinated [address]:   Diagnoses and all orders for this visit:    1. Cough    2. Person under investigation for COVID-19    go get tested asap  Go home and quarantine until tested neg  Given note until Monday  No antibiotic at this time  Nothing suggests bacterial source        Subjective:       Prior to Admission medications    Medication Sig Start Date End Date Taking? Authorizing Provider   norgestimate-ethinyl estradioL (Ival Sicilian) 0.25-35 mg-mcg tab Take 1 Tablet by mouth daily. Patient not taking: Reported on 10/21/2021 7/27/21   Dariel Long MD   multivitamin (ONE A DAY) tablet Take 1 Tab by mouth daily. Patient not taking: Reported on 6/11/2021    Provider, Historical     Patient Active Problem List    Diagnosis Date Noted    Encounter for monitoring oral hormonal therapy for heavy menses 05/27/2021    Severe obesity (BMI 35.0-39. 9) with comorbidity ([facility] [address] 04/23/2018    Obesity, Class II, BMI 35-39.9, with comorbidity 09/25/2013    Secondary dysmenorrhea 07/02/2013    Menorrhagia with regular cycle 07/02/2013    Low grade squamous intraepith lesion on cytologic smear cervix (lgsil) 12/04/2012    Hypertension 01/28/2011    Vitamin D deficiency 11/12/2010    Hyperlipidemia 11/05/2010     Current Outpatient Medications   Medication Sig Dispense Refill    norgestimate-ethinyl estradioL (ORTHO-CYCLEN, SPRINTEC) 0.25-35 mg-mcg tab Take 1 Tablet by mouth daily. (Patient not taking: Reported on 10/21/2021) 1 Dose Pack 11    multivitamin (ONE A DAY) tablet Take 1 Tab by mouth daily.  (Patient not taking: Reported on 6/11/2021)         ROS    Objective:     Patient-Reported Vitals 10/21/2021   Patient-Reported Weight 193lb   Patient-Reported Pulse -   Patient-Reported Systolic  -   Patient-Reported Diastolic -   Patient-Reported LMP 10/15/21        [INSTRUCTIONS:  \"[x]\" Indicates a positive item  \"[]\" Indicates a negative item  -- DELETE ALL ITEMS NOT EXAMINED]    Constitutional: [x] Appears well-developed and well-nourished [x] No apparent distress      [] Abnormal -     Mental status: [x] Alert and awake  [x] Oriented to person/place/time [x] Able to follow commands    [] Abnormal -     Eyes:   EOM    [x]  Normal    [] Abnormal -   Sclera  [x]  Normal    [] Abnormal -          Discharge [x]  None visible   [] Abnormal -     HENT: [x] Normocephalic, atraumatic  [] Abnormal -   [x] Mouth/Throat: Mucous membranes are moist    External Ears [x] Normal  [] Abnormal -    Neck: [x] No visualized mass [] Abnormal -     Pulmonary/Chest: [x] Respiratory effort normal   [x] No visualized signs of difficulty breathing or respiratory distress        [] Abnormal -      Musculoskeletal:   [x] Normal gait with no signs of ataxia         [x] Normal range of motion of neck        [] Abnormal -     Neurological:        [x] No Facial Asymmetry (Cranial nerve 7 motor function) (limited exam due to video visit)          [x] No gaze palsy        [] Abnormal -          Skin:        [x] No significant exanthematous lesions or discoloration noted on facial skin         [] Abnormal -            Psychiatric:       [x] Normal Affect [] Abnormal -        [x] No Hallucinations    Other pertinent observable physical exam findings:-        We discussed the expected course, resolution and complications of the diagnosis(es) in detail. Medication risks, benefits, costs, interactions, and alternatives were discussed as indicated. I advised her to contact the office if her condition worsens, changes or fails to improve as anticipated. She expressed understanding with the diagnosis(es) and plan.        Irlanda Hitchcock Travis Pena, was evaluated through a synchronous (real-time) audio-video encounter. The patient (or guardian if applicable) is aware that this is a billable service. Verbal consent to proceed has been obtained within the past 12 months. The visit was conducted pursuant to the emergency declaration under the [address] [place] [address] and the [employer] and [employer] General Act. Patient identification was verified, and a caregiver was present when appropriate. The patient was located in a state where the provider was credentialed to provide care.       Will Brush MD

## 2021-10-21 NOTE — PROGRESS NOTES
Harvey Petty is a 27 y.o. female      Chief Complaint   Patient presents with    Sinus Infection     x 10 days          1. Have you been to the ER, urgent care clinic since your last visit? no Hospitalized since your last visit? no      2. Have you seen or consulted any other health care providers outside of the [address] [place] [address] since your last visit? Include any pap smears or colon screening.   no

## 2021-10-21 NOTE — PROGRESS NOTES
No chief complaint on file. she is a 27y.o. year old female who presents for evaluation. The patient presents to the office today for concerns of a cough. She reports her cough started back on Monday. Patient states that she has not had fever or chills but has been having a runny nose. Patient states that she has not experienced sore throat. The patient reports that she has been taking Mucinex. Patient reports that she is not feeling poorly instructions and aggravating cough. Reviewed PmHx, RxHx, FmHx, SocHx, AllgHx and updated and dated in the chart. Review of Systems - negative except as listed above    Objective:     Vitals:    10/21/21 1849   Pulse: 69   Temp: 97.3 °F (36.3 °C)   TempSrc: Temporal   SpO2: 98%     Physical Examination: General appearance - alert, well appearing, and in no distress  Mental status - normal mood, behavior, speech, dress, motor activity, and thought processes    Assessment/ Plan:   Diagnoses and all orders for this visit:    1. Cough  -     XR CHEST PA LAT; Future  -     NOVEL CORONAVIRUS (COVID-19); Future  -     guaiFENesin-codeine (Cheratussin AC) 100-10 mg/5 mL solution; Take 5 mL by mouth three (3) times daily as needed for Cough for up to 3 days. Max Daily Amount: 15 mL. Patient was Covid tested. I like the patient to get a chest x-ray done. I explained to the patient that based on the chest x-ray additional medication may be since the pharmacy. Cough medication has been sent to the pharmacy. I have discussed the diagnosis with the patient and the intended plan as seen in the above orders. The patient has received an after-visit summary and questions were answered concerning future plans.      Medication Side Effects and Warnings were discussed with patient: yes  Patient Labs were reviewed and or requested: n/a  Patient Past Records were reviewed and or requested  yes    Toshia Meade PA-C

## 2021-10-22 ENCOUNTER — HOSPITAL ENCOUNTER (OUTPATIENT)
Dept: GENERAL RADIOLOGY | Age: 31
Discharge: HOME | End: 2021-10-22
Payer: COMMERCIAL

## 2021-10-22 ENCOUNTER — TELEPHONE (OUTPATIENT)
Dept: INTERNAL MEDICINE CLINIC | Age: 31
End: 2021-10-22

## 2021-10-22 DIAGNOSIS — R05.9 COUGH: Primary | ICD-10-CM

## 2021-10-22 DIAGNOSIS — R05.9 COUGH: ICD-10-CM

## 2021-10-22 LAB
SARS-COV-2, NAA 2 DAY TAT: NORMAL
SARS-COV-2, NAA: NOT DETECTED

## 2021-10-22 PROCEDURE — 71046 X-RAY EXAM CHEST 2 VIEWS: CPT

## 2021-10-22 RX ORDER — METHYLPREDNISOLONE 4 MG/1
TABLET ORAL
Qty: 1 DOSE PACK | Refills: 0 | Status: SHIPPED | OUTPATIENT
Start: 2021-10-22 | End: 2022-10-31

## 2021-10-22 NOTE — TELEPHONE ENCOUNTER
Medrol Dosepak has been sent to the pharmacy for the patient to . She will reach out to me on Monday to let me know how she is doing.

## 2021-10-22 NOTE — PROGRESS NOTES
Spoke with patient and advised chest xray is normal. Will treat for possible bronchitis. Steroid pack will be sent to the pharmacy. Advised patient recheck me on Monday to let me know how she is doing.

## 2021-10-29 ENCOUNTER — TELEPHONE (OUTPATIENT)
Dept: INTERNAL MEDICINE CLINIC | Age: 31
End: 2021-10-29

## 2021-10-29 DIAGNOSIS — B37.31 VAGINAL YEAST INFECTION: Primary | ICD-10-CM

## 2021-10-29 DIAGNOSIS — R05.9 COUGH: Primary | ICD-10-CM

## 2021-10-29 RX ORDER — FLUCONAZOLE 150 MG/1
150 TABLET ORAL DAILY
Qty: 1 TABLET | Refills: 1 | Status: SHIPPED | OUTPATIENT
Start: 2021-10-29 | End: 2021-10-30

## 2021-10-29 RX ORDER — CODEINE PHOSPHATE AND GUAIFENESIN 10; 100 MG/5ML; MG/5ML
5 SOLUTION ORAL
Qty: 45 ML | Refills: 0 | Status: SHIPPED | OUTPATIENT
Start: 2021-10-29 | End: 2021-11-01

## 2021-10-29 RX ORDER — CEFDINIR 300 MG/1
300 CAPSULE ORAL 2 TIMES DAILY
Qty: 20 CAPSULE | Refills: 0 | Status: SHIPPED | OUTPATIENT
Start: 2021-10-29 | End: 2022-10-31

## 2021-10-29 NOTE — TELEPHONE ENCOUNTER
Diflucan has been sent to the pharmacy for use if patient develop a yeast infection post antibiotic treatment

## 2021-10-29 NOTE — TELEPHONE ENCOUNTER
Patient states her cough is still present and it is wet. I will send antibiotics to the pharmacy and a refill of her cough medication. If not better she should follow up with her PCP.

## 2021-12-08 ENCOUNTER — APPOINTMENT (OUTPATIENT)
Dept: ULTRASOUND IMAGING | Age: 31
End: 2021-12-08
Attending: PHYSICIAN ASSISTANT
Payer: COMMERCIAL

## 2021-12-08 ENCOUNTER — HOSPITAL ENCOUNTER (EMERGENCY)
Age: 31
Discharge: HOME | End: 2021-12-08
Attending: EMERGENCY MEDICINE
Payer: COMMERCIAL

## 2021-12-08 VITALS
HEART RATE: 80 BPM | SYSTOLIC BLOOD PRESSURE: 174 MMHG | BODY MASS INDEX: 30.29 KG/M2 | HEIGHT: 67 IN | RESPIRATION RATE: 16 BRPM | TEMPERATURE: 98 F | WEIGHT: 193 LBS | DIASTOLIC BLOOD PRESSURE: 92 MMHG | OXYGEN SATURATION: 99 %

## 2021-12-08 DIAGNOSIS — R10.9 ABDOMINAL CRAMPING: Primary | ICD-10-CM

## 2021-12-08 LAB
APPEARANCE UR: ABNORMAL
BACTERIA URNS QL MICRO: ABNORMAL /HPF
BILIRUB UR QL: NEGATIVE
COLOR UR: ABNORMAL
EPITH CASTS URNS QL MICRO: ABNORMAL /LPF
GLUCOSE UR STRIP.AUTO-MCNC: NEGATIVE MG/DL
HCG UR QL: NEGATIVE
HGB UR QL STRIP: ABNORMAL
KETONES UR QL STRIP.AUTO: ABNORMAL MG/DL
LEUKOCYTE ESTERASE UR QL STRIP.AUTO: ABNORMAL
NITRITE UR QL STRIP.AUTO: NEGATIVE
PH UR STRIP: 7 [PH] (ref 5–8)
PROT UR STRIP-MCNC: 100 MG/DL
RBC #/AREA URNS HPF: >100 /HPF (ref 0–5)
SP GR UR REFRACTOMETRY: 1.02 (ref 1–1.03)
UR CULT HOLD, URHOLD: NORMAL
UROBILINOGEN UR QL STRIP.AUTO: 1 EU/DL (ref 0.2–1)
WBC URNS QL MICRO: ABNORMAL /HPF (ref 0–4)

## 2021-12-08 PROCEDURE — 81001 URINALYSIS AUTO W/SCOPE: CPT

## 2021-12-08 PROCEDURE — 81025 URINE PREGNANCY TEST: CPT

## 2021-12-08 PROCEDURE — 76856 US EXAM PELVIC COMPLETE: CPT

## 2021-12-08 PROCEDURE — 96372 THER/PROPH/DIAG INJ SC/IM: CPT

## 2021-12-08 PROCEDURE — 99282 EMERGENCY DEPT VISIT SF MDM: CPT

## 2021-12-08 PROCEDURE — 74011250636 HC RX REV CODE- 250/636: Performed by: PHYSICIAN ASSISTANT

## 2021-12-08 RX ORDER — IBUPROFEN 600 MG/1
600 TABLET ORAL
Qty: 20 TABLET | Refills: 0 | Status: SHIPPED | OUTPATIENT
Start: 2021-12-08

## 2021-12-08 RX ORDER — KETOROLAC TROMETHAMINE 30 MG/ML
30 INJECTION, SOLUTION INTRAMUSCULAR; INTRAVENOUS
Status: COMPLETED | OUTPATIENT
Start: 2021-12-08 | End: 2021-12-08

## 2021-12-08 RX ADMIN — KETOROLAC TROMETHAMINE 30 MG: 30 INJECTION, SOLUTION INTRAMUSCULAR at 17:12

## 2021-12-08 NOTE — Clinical Note
Tiffanie Walsh was seen and treated in our emergency department on 12/8/2021.         Erendira Cristina

## 2021-12-08 NOTE — ED PROVIDER NOTES
Lu Sánchez is a 27 y.o. female with PMhx of HTN, dysmenorrhea, menorrhagia, hypercholesterolemia, uterine fibroids with removal who presents to ED with cc of 9/10 \"menstrual pain \". Patient states she has had painful menses like this in the past, denies any new qualities or worsening bleeding to current menstrual period. She denies focal right lower quadrant or left lower quadrant pain and notes it is across her lower abdomen. States she usually takes ibuprofen for such but she does not have any more and so has not yet taken anything for pain. Notes Dr. Anabella Lewis is her gynecologist, states she has had uterine fibroids in the past and has had surgery for removal.    Pt denies additional symptoms of F/C, CP, SOB, dysuria, urinary frequency, constipation, diarrhea, nausea, vomiting. PMHx: Reviewed, as below. PSHx: Reviewed, as below. PCP: Anabella Griffin MD    There are no other complaints verbalized at this time. Past Medical History:   Diagnosis Date    Adverse effect of anesthesia     pt states it took \"at least 2 hours to wake up\" after last surgery    Hypercholesterolemia     Hypertension 1/28/2011    IUD (intrauterine device) in place 07/23/2015    removed 2015    LSIL (low grade squamous intraepithelial lesion) on Pap smear 12/4/12    Menorrhagia with regular cycle 7/2/2013    Obesity, Class II, BMI 35-39.9 9/23/13    BMI 37    Secondary dysmenorrhea 7/2/2013       Past Surgical History:   Procedure Laterality Date    HX COLPOSCOPY  2012    HX DILATION AND CURETTAGE  2008    pregnancy related    HX GASTRIC BYPASS      sleeve August 2020    HX OTHER SURGICAL  11/2013    laparoscopy/exploratory-uterine issues, checking endometriosis patient stated approx.  2012    HX WISDOM TEETH EXTRACTION      ME CONIZATION CERVIX,LOOP ELECTRD N/A 1/26/2018    LOOP ELECTRODE EXCISION PROCEDURE OF CERVIX (LEEP) performed by Niharika Fall MD at [facility] History:   Problem Relation Age of Onset    Hypertension Mother     Hypertension Father     Cancer Father         prostate    Hypertension Sister     Hypertension Maternal Aunt     Diabetes Maternal Grandmother     Hypertension Maternal Grandmother     Diabetes Maternal Grandfather     Hypertension Maternal Grandfather     Diabetes Paternal Grandmother     Hypertension Paternal Grandmother     Diabetes Paternal Grandfather     Hypertension Paternal Grandfather        Social History     Socioeconomic History    Marital status: SINGLE     Spouse name: Not on file    Number of children: Not on file    Years of education: Not on file    Highest education level: Not on file   Occupational History    Not on file   Tobacco Use    Smoking status: Never Smoker    Smokeless tobacco: Never Used   Substance and Sexual Activity    Alcohol use: Yes     Alcohol/week: 1.0 standard drink     Types: 1 Shots of liquor per week     Comment: on special occasions    Drug use: No    Sexual activity: Yes     Partners: Male     Birth control/protection: None     Comment: Pill use began in July 2013. No other history of hormonal contraceptive use   Other Topics Concern    Not on file   Social History Narrative    Not on file     Social Determinants of Health     Financial Resource Strain:     Difficulty of Paying Living Expenses: Not on file   Food Insecurity:     Worried About Running Out of Food in the Last Year: Not on file    Crescencio of Food in the Last Year: Not on file   Transportation Needs:     Lack of Transportation (Medical): Not on file    Lack of Transportation (Non-Medical):  Not on file   Physical Activity:     Days of Exercise per Week: Not on file    Minutes of Exercise per Session: Not on file   Stress:     Feeling of Stress : Not on file   Social Connections:     Frequency of Communication with Friends and Family: Not on file    Frequency of Social Gatherings with Friends and Family: Not on file    Attends Anabaptist Services: Not on file    Active Member of Clubs or Organizations: Not on file    Attends Club or Organization Meetings: Not on file    Marital Status: Not on file   Intimate Partner Violence:     Fear of Current or Ex-Partner: Not on file    Emotionally Abused: Not on file    Physically Abused: Not on file    Sexually Abused: Not on file   Housing Stability:     Unable to Pay for Housing in the Last Year: Not on file    Number of [place] in the Last Year: Not on file    Unstable Housing in the Last Year: Not on file         ALLERGIES: Apple and Lisinopril    Review of Systems   Constitutional: Negative for chills and fever. HENT: Negative for congestion. Respiratory: Negative for cough and shortness of breath. Cardiovascular: Negative for chest pain. Gastrointestinal: Positive for abdominal pain. Negative for constipation, diarrhea, nausea and vomiting. Genitourinary: Positive for vaginal discharge. Negative for dysuria and frequency. All other systems reviewed and are negative. Vitals:    12/08/21 1533   BP: (!) 174/92   Pulse: 80   Resp: 16   Temp: 98 °F (36.7 °C)   SpO2: 99%   Weight: 87.5 kg (193 lb)   Height: 5' 7\" (1.702 m)            Physical Exam  Vitals and nursing note reviewed. Constitutional:       Appearance: Normal appearance. She is not diaphoretic. HENT:      Head: Atraumatic. Right Ear: External ear normal.      Left Ear: External ear normal.   Eyes:      Conjunctiva/sclera: Conjunctivae normal.   Cardiovascular:      Rate and Rhythm: Normal rate and regular rhythm. Heart sounds: Normal heart sounds. No murmur heard. No friction rub. No gallop. Pulmonary:      Effort: No respiratory distress. Breath sounds: Normal breath sounds. No stridor. No wheezing, rhonchi or rales. Abdominal:      General: Bowel sounds are normal. There is no distension. Palpations: Abdomen is soft. Tenderness:  There is abdominal tenderness in the suprapubic area. There is no guarding or rebound. Hernia: No hernia is present. Musculoskeletal:         General: No swelling. Cervical back: Normal range of motion. No rigidity. Skin:     General: Skin is warm and dry. Neurological:      Mental Status: She is alert and oriented to person, place, and time. Mental status is at baseline. Gait: Gait normal.          MDM  Number of Diagnoses or Management Options     Amount and/or Complexity of Data Reviewed  Clinical lab tests: ordered and reviewed  Tests in the radiology section of CPT®: ordered and reviewed  Discuss the patient with other providers: yes (Dr. Fawn Falcon, ED attending)           Procedures               VITAL SIGNS:  Vitals:    12/08/21 1533   BP: (!) 174/92   Pulse: 80   Resp: 16   Temp: 98 °F (36.7 °C)   SpO2: 99%   Weight: 87.5 kg (193 lb)   Height: 5' 7\" (1.702 m)         LABS:  Recent Results (from the past 24 hour(s))   URINALYSIS W/MICROSCOPIC    Collection Time: 12/08/21  3:52 PM   Result Value Ref Range    Color RED      Appearance TURBID (A) CLEAR      Specific gravity 1.021 1.003 - 1.030      pH (UA) 7.0 5.0 - 8.0      Protein 100 (A) NEG mg/dL    Glucose Negative NEG mg/dL    Ketone TRACE (A) NEG mg/dL    Bilirubin Negative NEG      Blood LARGE (A) NEG      Urobilinogen 1.0 0.2 - 1.0 EU/dL    Nitrites Negative NEG      Leukocyte Esterase MODERATE (A) NEG      WBC 20-50 0 - 4 /hpf    RBC >100 (H) 0 - 5 /hpf    Epithelial cells MODERATE (A) FEW /lpf    Bacteria 2+ (A) NEG /hpf   URINE CULTURE HOLD SAMPLE    Collection Time: 12/08/21  3:52 PM    Specimen: Serum; Urine   Result Value Ref Range    Urine culture hold        Urine on hold in Microbiology dept for 2 days. If unpreserved urine is submitted, it cannot be used for addtional testing after 24 hours, recollection will be required.    HCG URINE, QL. - POC    Collection Time: 12/08/21  5:03 PM   Result Value Ref Range    Pregnancy test,urine (POC) Negative NEG           IMAGING:  US PELV NON OBS   Final Result   Uterine fibroids. Medications During Visit:  Medications   ketorolac (TORADOL) injection 30 mg (30 mg IntraMUSCular Given 12/8/21 1712)         DECISION MAKING:    Alden Millard is a 27 y.o. female who comes in as above. Presents afebrile and hemodynamically stable. POC negative for pregnancy. Dirty catch obtained from urine. I have discussed results with patient. She denies dysuria or urinary frequency. We have discussed that if she develops any of these to return to ED or follow-up with PCP/GYN for repeat UA to evaluate for any possibility of infection. She notes normal amount of brisk bleeding, states this is not heavier, she is not tachycardic or demonstrating other signs or symptoms indicative of acute blood loss. Ultrasound obtained which demonstrates uterine fibroids. Ultrasound tech did not obtain transvaginal ultrasound to further evaluate ovaries. I have discussed these results with patient. Suspicion for ovarian torsion low given that patient reports pain is nonfocal to right lower quadrant or left lower quadrant, she has no focal tenderness to those locations, and states current pain is the same as her previous episodes of menstrual pain. We have discussed close return precautions in this regard. Will have follow-up with her gynecologist.  Will discharge with course of NSAID medication. I have discussed care with ED attending. Pt and I have discussed close return precautions as well as follow up recommendations. Opportunity for questions presented. Pt verbalizes their understanding and agreement with care plan. IMPRESSION:  1.  Abdominal cramping        DISPOSITION:  Discharged      Discharge Medication List as of 12/8/2021  4:51 PM      START taking these medications    Details   ibuprofen (MOTRIN) 600 mg tablet Take 1 Tablet by mouth every six (6) hours as needed for Pain., Print, Disp-20 Tablet, R-0 CONTINUE these medications which have NOT CHANGED    Details   cefdinir (OMNICEF) 300 mg capsule Take 1 Capsule by mouth two (2) times a day., Normal, Disp-20 Capsule, R-0      methylPREDNISolone (MEDROL DOSEPACK) 4 mg tablet Use as directed, Normal, Disp-1 Dose Pack, R-0      norgestimate-ethinyl estradioL (ORTHO-CYCLEN, SPRINTEC) 0.25-35 mg-mcg tab Take 1 Tablet by mouth daily. , Normal, Disp-1 Dose Pack, R-11      multivitamin (ONE A DAY) tablet Take 1 Tab by mouth daily. , Historical Med              Follow-up Information     Follow up With Specialties Details Why Contact Info    Please follow up with your gynecologist regarding your imaging and your pain  Schedule an appointment as soon as possible for a visit       [facility] EMERGENCY DEPT Emergency Medicine Go to  As needed, If symptoms worsen [address] [facility]  [phone]            The patient is asked to follow-up with their primary care provider and any other physicians as above. We have discussed strict return precautions and the patient is asked to return promptly for any increased concerns or worsening of symptoms and for return precautions regarding their symptoms today. They can return to this emergency department or any other emergency department. I have discussed with them results as above and presented opportunity for questions. They verbalize their understanding of the above and agreement with care plan. Please note that this dictation was completed with [employer], the computer voice recognition software. Quite often unanticipated grammatical, syntax, homophones, and other interpretive errors are inadvertently transcribed by the computer software. Please disregard these errors. Please excuse any errors that have escaped final proofreading.

## 2021-12-08 NOTE — ED TRIAGE NOTES
Patient reports menstrual cycle pain started today. Patient did not take anything for pian. Patient is ambulatory in Triage.

## 2022-03-19 PROBLEM — E66.01 SEVERE OBESITY (BMI 35.0-39.9) WITH COMORBIDITY (HCC): Status: ACTIVE | Noted: 2018-04-23

## 2022-03-19 PROBLEM — Z51.81 ENCOUNTER FOR MONITORING ORAL HORMONAL THERAPY FOR HEAVY MENSES: Status: ACTIVE | Noted: 2021-05-27

## 2022-03-19 PROBLEM — N92.0 ENCOUNTER FOR MONITORING ORAL HORMONAL THERAPY FOR HEAVY MENSES: Status: ACTIVE | Noted: 2021-05-27

## 2022-03-19 PROBLEM — Z79.3 ENCOUNTER FOR MONITORING ORAL HORMONAL THERAPY FOR HEAVY MENSES: Status: ACTIVE | Noted: 2021-05-27

## 2022-09-20 ENCOUNTER — OFFICE VISIT (OUTPATIENT)
Dept: OBGYN CLINIC | Age: 32
End: 2022-09-20

## 2022-09-20 VITALS
BODY MASS INDEX: 33.27 KG/M2 | HEIGHT: 67 IN | WEIGHT: 212 LBS | HEART RATE: 87 BPM | RESPIRATION RATE: 16 BRPM | SYSTOLIC BLOOD PRESSURE: 146 MMHG | DIASTOLIC BLOOD PRESSURE: 82 MMHG | OXYGEN SATURATION: 98 %

## 2022-09-20 DIAGNOSIS — N76.0 ACUTE VAGINITIS: Primary | ICD-10-CM

## 2022-09-20 PROCEDURE — 99213 OFFICE O/P EST LOW 20 MIN: CPT | Performed by: OBSTETRICS & GYNECOLOGY

## 2022-09-24 ENCOUNTER — PATIENT MESSAGE (OUTPATIENT)
Dept: OBGYN CLINIC | Age: 32
End: 2022-09-24

## 2022-09-24 LAB
A VAGINAE DNA VAG QL NAA+PROBE: ABNORMAL SCORE
BVAB2 DNA VAG QL NAA+PROBE: ABNORMAL SCORE
C ALBICANS DNA VAG QL NAA+PROBE: NEGATIVE
C GLABRATA DNA VAG QL NAA+PROBE: NEGATIVE
C KRUSEI DNA VAG QL NAA+PROBE: NEGATIVE
C LUSITANIAE DNA VAG QL NAA+PROBE: NEGATIVE
C TRACH DNA VAG QL NAA+PROBE: NEGATIVE
CANDIDA DNA VAG QL NAA+PROBE: NEGATIVE
MEGA1 DNA VAG QL NAA+PROBE: ABNORMAL SCORE
N GONORRHOEA DNA VAG QL NAA+PROBE: NEGATIVE
T VAGINALIS DNA VAG QL NAA+PROBE: NEGATIVE

## 2022-09-26 DIAGNOSIS — N76.0 BACTERIAL VAGINOSIS: Primary | ICD-10-CM

## 2022-09-26 DIAGNOSIS — B96.89 BACTERIAL VAGINOSIS: Primary | ICD-10-CM

## 2022-09-26 RX ORDER — METRONIDAZOLE 7.5 MG/G
1 GEL VAGINAL
Qty: 25 G | Refills: 0 | Status: SHIPPED | OUTPATIENT
Start: 2022-09-26 | End: 2022-10-01

## 2022-09-28 NOTE — H&P (VIEW-ONLY)
Kevin Durham is a 34 y.o. female, , Patient's last menstrual period was 2022., who presents today for the following:  Chief Complaint   Patient presents with    Advice Only     Patient wants to try and conceive. Patient wants to see if she has bv or yeast infection. Allergies   Allergen Reactions    Apple Other (comments)     Patient stated causes itchy throat    Lisinopril Swelling     Lips swell       Current Outpatient Medications   Medication Sig    metroNIDAZOLE (METROGEL) 0.75 % (37.5mg/5 gram) gel Insert 5 g into vagina nightly for 5 days.  ibuprofen (MOTRIN) 600 mg tablet Take 1 Tablet by mouth every six (6) hours as needed for Pain. (Patient not taking: Reported on 2022)    cefdinir (OMNICEF) 300 mg capsule Take 1 Capsule by mouth two (2) times a day. (Patient not taking: Reported on 2022)    methylPREDNISolone (MEDROL DOSEPACK) 4 mg tablet Use as directed (Patient not taking: Reported on 2022)    norgestimate-ethinyl estradioL (ORTHO-CYCLEN, SPRINTEC) 0.25-35 mg-mcg tab Take 1 Tablet by mouth daily. (Patient not taking: No sig reported)    multivitamin (ONE A DAY) tablet Take 1 Tab by mouth daily. (Patient not taking: No sig reported)     No current facility-administered medications for this visit.        Past Medical History:   Diagnosis Date    Adverse effect of anesthesia     pt states it took \"at least 2 hours to wake up\" after last surgery    Hypercholesterolemia     Hypertension 2011    IUD (intrauterine device) in place 2015    removed     LSIL (low grade squamous intraepithelial lesion) on Pap smear 12    Menorrhagia with regular cycle 2013    Obesity, Class II, BMI 35-39.9 13    BMI 37    Secondary dysmenorrhea 2013       Past Surgical History:   Procedure Laterality Date    HX COLPOSCOPY      HX DILATION AND CURETTAGE      pregnancy related    HX GASTRIC BYPASS      sleeve 2020    HX OTHER SURGICAL  11/2013    laparoscopy/exploratory-uterine issues, checking endometriosis patient stated approx. 2012    HX WISDOM TEETH EXTRACTION      [place] CONIZATION CERVIX,LOOP ELECTRD N/A 1/26/2018    LOOP ELECTRODE EXCISION PROCEDURE OF CERVIX (LEEP) performed by Poppy Marin MD at [address]       Family History   Problem Relation Age of Onset    Hypertension Mother     Hypertension Father     Cancer Father         prostate    Hypertension Sister     Hypertension Maternal Aunt     Diabetes Maternal Grandmother     Hypertension Maternal Grandmother     Diabetes Maternal Grandfather     Hypertension Maternal Grandfather     Diabetes Paternal Grandmother     Hypertension Paternal Grandmother     Diabetes Paternal Grandfather     Hypertension Paternal Grandfather        Social History     Socioeconomic History    Marital status: SINGLE     Spouse name: Not on file    Number of children: Not on file    Years of education: Not on file    Highest education level: Not on file   Occupational History    Not on file   Tobacco Use    Smoking status: Never    Smokeless tobacco: Never   Substance and Sexual Activity    Alcohol use: Yes     Alcohol/week: 1.0 standard drink     Types: 1 Shots of liquor per week     Comment: on special occasions    Drug use: No    Sexual activity: Yes     Partners: Male     Birth control/protection: None     Comment: Pill use began in July 2013. No other history of hormonal contraceptive use   Other Topics Concern    Not on file   Social History Narrative    Not on file     Social Determinants of Health     Financial Resource Strain: Not on file   Food Insecurity: Not on file   Transportation Needs: Not on file   Physical Activity: Not on file   Stress: Not on file   Social Connections: Not on file   Intimate Partner Violence: Not on file   Housing Stability: Not on file         Advice Only  Vaginal Discharge   Reported by patient.  Location: vagina   Quality: foul-smelling; fishy smell   Severity: mild - recently treated and reports some improvement, but unsure if it has resolved  Duration: symptoms lasting over 2 weeks   Context: history of recurrent vaginal infections   Associated Symptoms: no vaginal burning; no swelling/redness; no fever/chills; no diarrhea; no abdominal pain; no pelvic pain; no vaginal pain; no pain during urination; no pain during intercourse; no vaginal lump; no genital lesion; no sexually transmitted disease; no fever; vaginal itching       Review of Systems   Constitutional: Negative. Respiratory: Negative. Cardiovascular: Negative. Gastrointestinal: Negative. Genitourinary: Negative. Musculoskeletal: Negative. Skin: Negative. Neurological: Negative. Endo/Heme/Allergies: Negative. Psychiatric/Behavioral: Negative. All other systems reviewed and are negative. BP (!) 146/82 (BP 1 Location: Right upper arm, BP Patient Position: Sitting)   Pulse 87   Resp 16   Ht 5' 7\" (1.702 m)   Wt 212 lb (96.2 kg)   LMP 08/30/2022   SpO2 98%   BMI 33.20 kg/m²    OBGyn Exam   PE:  Constitutional: General Appearance: healthy-appearing, well-nourished, well-developed, and well groomed. Psychiatric: Orientation: to time, place, and person. Mood and Affect: normal mood and affect and appropriate and active and alert. Abdomen: Auscultation/Inspection/Palpation: no tenderness and mass and non-distended. Hernia: none palpated. Female Genitalia: Vulva: no masses, atrophy, or lesions. Bladder/Urethra: no urethral discharge or mass and normal meatus and bladder non distended. Vagina no tenderness, erythema, cystocele, rectocele, abnormal vaginal discharge, or vesicle(s) or ulcers. Cervix: no discharge or cervical motion tenderness and grossly normal.     Uterus: mobile, non-tender, and no uterine prolapse. Adnexa/Parametria: no adnexal tenderness.        1. Acute vaginitis    - NUSWAB VAGINITIS PLUS (VG+) WITH AMEYA (SIX SPECIES)

## 2022-09-28 NOTE — PROGRESS NOTES
Michael Mendoza is a 34 y.o. female, , Patient's last menstrual period was 2022., who presents today for the following:  Chief Complaint   Patient presents with    Advice Only     Patient wants to try and conceive. Patient wants to see if she has bv or yeast infection. Allergies   Allergen Reactions    Apple Other (comments)     Patient stated causes itchy throat    Lisinopril Swelling     Lips swell       Current Outpatient Medications   Medication Sig    metroNIDAZOLE (METROGEL) 0.75 % (37.5mg/5 gram) gel Insert 5 g into vagina nightly for 5 days.  ibuprofen (MOTRIN) 600 mg tablet Take 1 Tablet by mouth every six (6) hours as needed for Pain. (Patient not taking: Reported on 2022)    cefdinir (OMNICEF) 300 mg capsule Take 1 Capsule by mouth two (2) times a day. (Patient not taking: Reported on 2022)    methylPREDNISolone (MEDROL DOSEPACK) 4 mg tablet Use as directed (Patient not taking: Reported on 2022)    norgestimate-ethinyl estradioL (ORTHO-CYCLEN, SPRINTEC) 0.25-35 mg-mcg tab Take 1 Tablet by mouth daily. (Patient not taking: No sig reported)    multivitamin (ONE A DAY) tablet Take 1 Tab by mouth daily. (Patient not taking: No sig reported)     No current facility-administered medications for this visit.        Past Medical History:   Diagnosis Date    Adverse effect of anesthesia     pt states it took \"at least 2 hours to wake up\" after last surgery    Hypercholesterolemia     Hypertension 2011    IUD (intrauterine device) in place 2015    removed     LSIL (low grade squamous intraepithelial lesion) on Pap smear 12    Menorrhagia with regular cycle 2013    Obesity, Class II, BMI 35-39.9 13    BMI 37    Secondary dysmenorrhea 2013       Past Surgical History:   Procedure Laterality Date    HX COLPOSCOPY      HX DILATION AND CURETTAGE      pregnancy related    HX GASTRIC BYPASS      sleeve 2020    HX OTHER SURGICAL  11/2013    laparoscopy/exploratory-uterine issues, checking endometriosis patient stated approx. 2012    HX WISDOM TEETH EXTRACTION      [place] CONIZATION CERVIX,LOOP ELECTRD N/A 1/26/2018    LOOP ELECTRODE EXCISION PROCEDURE OF CERVIX (LEEP) performed by Ailyn Farias MD at [address]       Family History   Problem Relation Age of Onset    Hypertension Mother     Hypertension Father     Cancer Father         prostate    Hypertension Sister     Hypertension Maternal Aunt     Diabetes Maternal Grandmother     Hypertension Maternal Grandmother     Diabetes Maternal Grandfather     Hypertension Maternal Grandfather     Diabetes Paternal Grandmother     Hypertension Paternal Grandmother     Diabetes Paternal Grandfather     Hypertension Paternal Grandfather        Social History     Socioeconomic History    Marital status: SINGLE     Spouse name: Not on file    Number of children: Not on file    Years of education: Not on file    Highest education level: Not on file   Occupational History    Not on file   Tobacco Use    Smoking status: Never    Smokeless tobacco: Never   Substance and Sexual Activity    Alcohol use: Yes     Alcohol/week: 1.0 standard drink     Types: 1 Shots of liquor per week     Comment: on special occasions    Drug use: No    Sexual activity: Yes     Partners: Male     Birth control/protection: None     Comment: Pill use began in July 2013. No other history of hormonal contraceptive use   Other Topics Concern    Not on file   Social History Narrative    Not on file     Social Determinants of Health     Financial Resource Strain: Not on file   Food Insecurity: Not on file   Transportation Needs: Not on file   Physical Activity: Not on file   Stress: Not on file   Social Connections: Not on file   Intimate Partner Violence: Not on file   Housing Stability: Not on file         Advice Only  Vaginal Discharge   Reported by patient.  Location: vagina   Quality: foul-smelling; fishy smell   Severity: mild - recently treated and reports some improvement, but unsure if it has resolved  Duration: symptoms lasting over 2 weeks   Context: history of recurrent vaginal infections   Associated Symptoms: no vaginal burning; no swelling/redness; no fever/chills; no diarrhea; no abdominal pain; no pelvic pain; no vaginal pain; no pain during urination; no pain during intercourse; no vaginal lump; no genital lesion; no sexually transmitted disease; no fever; vaginal itching       Review of Systems   Constitutional: Negative. Respiratory: Negative. Cardiovascular: Negative. Gastrointestinal: Negative. Genitourinary: Negative. Musculoskeletal: Negative. Skin: Negative. Neurological: Negative. Endo/Heme/Allergies: Negative. Psychiatric/Behavioral: Negative. All other systems reviewed and are negative. BP (!) 146/82 (BP 1 Location: Right upper arm, BP Patient Position: Sitting)   Pulse 87   Resp 16   Ht 5' 7\" (1.702 m)   Wt 212 lb (96.2 kg)   LMP 08/30/2022   SpO2 98%   BMI 33.20 kg/m²    OBGyn Exam   PE:  Constitutional: General Appearance: healthy-appearing, well-nourished, well-developed, and well groomed. Psychiatric: Orientation: to time, place, and person. Mood and Affect: normal mood and affect and appropriate and active and alert. Abdomen: Auscultation/Inspection/Palpation: no tenderness and mass and non-distended. Hernia: none palpated. Female Genitalia: Vulva: no masses, atrophy, or lesions. Bladder/Urethra: no urethral discharge or mass and normal meatus and bladder non distended. Vagina no tenderness, erythema, cystocele, rectocele, abnormal vaginal discharge, or vesicle(s) or ulcers. Cervix: no discharge or cervical motion tenderness and grossly normal.     Uterus: mobile, non-tender, and no uterine prolapse. Adnexa/Parametria: no adnexal tenderness.        1. Acute vaginitis    - NUSWAB VAGINITIS PLUS (VG+) WITH AMEYA (SIX SPECIES)

## 2022-09-29 ENCOUNTER — HOSPITAL ENCOUNTER (OUTPATIENT)
Dept: PREADMISSION TESTING | Age: 32
Discharge: HOME | End: 2022-09-29
Payer: COMMERCIAL

## 2022-09-29 VITALS
SYSTOLIC BLOOD PRESSURE: 138 MMHG | BODY MASS INDEX: 33.04 KG/M2 | HEART RATE: 72 BPM | HEIGHT: 67 IN | TEMPERATURE: 98.5 F | DIASTOLIC BLOOD PRESSURE: 90 MMHG | RESPIRATION RATE: 16 BRPM | OXYGEN SATURATION: 100 % | WEIGHT: 210.54 LBS

## 2022-09-29 LAB
ABO + RH BLD: NORMAL
BLOOD GROUP ANTIBODIES SERPL: NEGATIVE
ERYTHROCYTE [DISTWIDTH] IN BLOOD BY AUTOMATED COUNT: 13.2 % (ref 11.5–14.5)
HCT VFR BLD AUTO: 34.5 % (ref 35–47)
HGB BLD-MCNC: 10.9 G/DL (ref 11.5–16)
MCH RBC QN AUTO: 26.1 PG (ref 26–34)
MCHC RBC AUTO-ENTMCNC: 31.6 G/DL (ref 30–36.5)
MCV RBC AUTO: 82.5 FL (ref 80–99)
NRBC # BLD: 0 K/UL (ref 0–0.01)
NRBC BLD-RTO: 0 PER 100 WBC
PLATELET # BLD AUTO: 278 K/UL (ref 150–400)
PMV BLD AUTO: 10.4 FL (ref 8.9–12.9)
RBC # BLD AUTO: 4.18 M/UL (ref 3.8–5.2)
SPECIMEN EXP DATE BLD: NORMAL
WBC # BLD AUTO: 4.6 K/UL (ref 3.6–11)

## 2022-09-29 PROCEDURE — 85027 COMPLETE CBC AUTOMATED: CPT

## 2022-09-29 PROCEDURE — 86900 BLOOD TYPING SEROLOGIC ABO: CPT

## 2022-09-29 PROCEDURE — 36415 COLL VENOUS BLD VENIPUNCTURE: CPT

## 2022-10-03 ENCOUNTER — HOSPITAL ENCOUNTER (OUTPATIENT)
Age: 32
Discharge: HOME | End: 2022-10-03
Attending: OBSTETRICS & GYNECOLOGY | Admitting: OBSTETRICS & GYNECOLOGY
Payer: COMMERCIAL

## 2022-10-03 ENCOUNTER — ANESTHESIA (OUTPATIENT)
Dept: SURGERY | Age: 32
End: 2022-10-03
Payer: COMMERCIAL

## 2022-10-03 ENCOUNTER — ANESTHESIA EVENT (OUTPATIENT)
Dept: SURGERY | Age: 32
End: 2022-10-03
Payer: COMMERCIAL

## 2022-10-03 VITALS
OXYGEN SATURATION: 98 % | RESPIRATION RATE: 18 BRPM | DIASTOLIC BLOOD PRESSURE: 84 MMHG | HEART RATE: 64 BPM | TEMPERATURE: 97.7 F | SYSTOLIC BLOOD PRESSURE: 129 MMHG

## 2022-10-03 DIAGNOSIS — N92.6 IRREGULAR MENSES: Primary | ICD-10-CM

## 2022-10-03 LAB — HCG UR QL: NEGATIVE

## 2022-10-03 PROCEDURE — 76210000006 HC OR PH I REC 0.5 TO 1 HR: Performed by: OBSTETRICS & GYNECOLOGY

## 2022-10-03 PROCEDURE — 76210000021 HC REC RM PH II 0.5 TO 1 HR: Performed by: OBSTETRICS & GYNECOLOGY

## 2022-10-03 PROCEDURE — 74011250636 HC RX REV CODE- 250/636: Performed by: NURSE ANESTHETIST, CERTIFIED REGISTERED

## 2022-10-03 PROCEDURE — 77030019905 HC CATH URETH INTMIT MDII -A: Performed by: OBSTETRICS & GYNECOLOGY

## 2022-10-03 PROCEDURE — 81025 URINE PREGNANCY TEST: CPT

## 2022-10-03 PROCEDURE — 74011250636 HC RX REV CODE- 250/636: Performed by: OBSTETRICS & GYNECOLOGY

## 2022-10-03 PROCEDURE — 74011250636 HC RX REV CODE- 250/636: Performed by: ANESTHESIOLOGY

## 2022-10-03 PROCEDURE — 74011000250 HC RX REV CODE- 250: Performed by: NURSE ANESTHETIST, CERTIFIED REGISTERED

## 2022-10-03 PROCEDURE — 2709999900 HC NON-CHARGEABLE SUPPLY: Performed by: OBSTETRICS & GYNECOLOGY

## 2022-10-03 PROCEDURE — 76060000032 HC ANESTHESIA 0.5 TO 1 HR: Performed by: OBSTETRICS & GYNECOLOGY

## 2022-10-03 PROCEDURE — 77030000301 HC HYSTEROSCOPE AVETA MEDI -D: Performed by: OBSTETRICS & GYNECOLOGY

## 2022-10-03 PROCEDURE — 88305 TISSUE EXAM BY PATHOLOGIST: CPT

## 2022-10-03 PROCEDURE — 58558 HYSTEROSCOPY BIOPSY: CPT | Performed by: OBSTETRICS & GYNECOLOGY

## 2022-10-03 PROCEDURE — 76010000138 HC OR TIME 0.5 TO 1 HR: Performed by: OBSTETRICS & GYNECOLOGY

## 2022-10-03 RX ORDER — SODIUM CHLORIDE 0.9 % (FLUSH) 0.9 %
5-40 SYRINGE (ML) INJECTION AS NEEDED
Status: DISCONTINUED | OUTPATIENT
Start: 2022-10-03 | End: 2022-10-03 | Stop reason: HOSPADM

## 2022-10-03 RX ORDER — LIDOCAINE HYDROCHLORIDE 10 MG/ML
0.1 INJECTION, SOLUTION EPIDURAL; INFILTRATION; INTRACAUDAL; PERINEURAL AS NEEDED
Status: DISCONTINUED | OUTPATIENT
Start: 2022-10-03 | End: 2022-10-03 | Stop reason: HOSPADM

## 2022-10-03 RX ORDER — FENTANYL CITRATE 50 UG/ML
50 INJECTION, SOLUTION INTRAMUSCULAR; INTRAVENOUS
Status: DISCONTINUED | OUTPATIENT
Start: 2022-10-03 | End: 2022-10-03 | Stop reason: HOSPADM

## 2022-10-03 RX ORDER — IBUPROFEN 800 MG/1
800 TABLET ORAL
Qty: 30 TABLET | Refills: 0 | Status: SHIPPED | OUTPATIENT
Start: 2022-10-03

## 2022-10-03 RX ORDER — DEXAMETHASONE SODIUM PHOSPHATE 4 MG/ML
INJECTION, SOLUTION INTRA-ARTICULAR; INTRALESIONAL; INTRAMUSCULAR; INTRAVENOUS; SOFT TISSUE AS NEEDED
Status: DISCONTINUED | OUTPATIENT
Start: 2022-10-03 | End: 2022-10-03 | Stop reason: HOSPADM

## 2022-10-03 RX ORDER — SODIUM CHLORIDE, SODIUM LACTATE, POTASSIUM CHLORIDE, CALCIUM CHLORIDE 600; 310; 30; 20 MG/100ML; MG/100ML; MG/100ML; MG/100ML
INJECTION, SOLUTION INTRAVENOUS
Status: DISCONTINUED | OUTPATIENT
Start: 2022-10-03 | End: 2022-10-03 | Stop reason: HOSPADM

## 2022-10-03 RX ORDER — SODIUM CHLORIDE 0.9 % (FLUSH) 0.9 %
5-40 SYRINGE (ML) INJECTION EVERY 8 HOURS
Status: DISCONTINUED | OUTPATIENT
Start: 2022-10-03 | End: 2022-10-03 | Stop reason: HOSPADM

## 2022-10-03 RX ORDER — HYDROMORPHONE HYDROCHLORIDE 1 MG/ML
0.5 INJECTION, SOLUTION INTRAMUSCULAR; INTRAVENOUS; SUBCUTANEOUS
Status: DISCONTINUED | OUTPATIENT
Start: 2022-10-03 | End: 2022-10-03 | Stop reason: HOSPADM

## 2022-10-03 RX ORDER — OXYCODONE AND ACETAMINOPHEN 5; 325 MG/1; MG/1
1 TABLET ORAL AS NEEDED
Status: DISCONTINUED | OUTPATIENT
Start: 2022-10-03 | End: 2022-10-03 | Stop reason: HOSPADM

## 2022-10-03 RX ORDER — PROPOFOL 10 MG/ML
INJECTION, EMULSION INTRAVENOUS AS NEEDED
Status: DISCONTINUED | OUTPATIENT
Start: 2022-10-03 | End: 2022-10-03 | Stop reason: HOSPADM

## 2022-10-03 RX ORDER — HYDRALAZINE HYDROCHLORIDE 20 MG/ML
10 INJECTION INTRAMUSCULAR; INTRAVENOUS
Status: DISCONTINUED | OUTPATIENT
Start: 2022-10-03 | End: 2022-10-03 | Stop reason: HOSPADM

## 2022-10-03 RX ORDER — DIPHENHYDRAMINE HYDROCHLORIDE 50 MG/ML
12.5 INJECTION, SOLUTION INTRAMUSCULAR; INTRAVENOUS AS NEEDED
Status: DISCONTINUED | OUTPATIENT
Start: 2022-10-03 | End: 2022-10-03 | Stop reason: HOSPADM

## 2022-10-03 RX ORDER — FENTANYL CITRATE 50 UG/ML
50 INJECTION, SOLUTION INTRAMUSCULAR; INTRAVENOUS AS NEEDED
Status: DISCONTINUED | OUTPATIENT
Start: 2022-10-03 | End: 2022-10-03 | Stop reason: HOSPADM

## 2022-10-03 RX ORDER — LABETALOL HCL 20 MG/4 ML
5 SYRINGE (ML) INTRAVENOUS
Status: DISCONTINUED | OUTPATIENT
Start: 2022-10-03 | End: 2022-10-03 | Stop reason: HOSPADM

## 2022-10-03 RX ORDER — NORETHINDRONE AND ETHINYL ESTRADIOL 0.5-0.035
5 KIT ORAL AS NEEDED
Status: DISCONTINUED | OUTPATIENT
Start: 2022-10-03 | End: 2022-10-03 | Stop reason: HOSPADM

## 2022-10-03 RX ORDER — MIDAZOLAM HYDROCHLORIDE 1 MG/ML
0.5 INJECTION, SOLUTION INTRAMUSCULAR; INTRAVENOUS
Status: DISCONTINUED | OUTPATIENT
Start: 2022-10-03 | End: 2022-10-03 | Stop reason: HOSPADM

## 2022-10-03 RX ORDER — MIDAZOLAM HYDROCHLORIDE 1 MG/ML
INJECTION, SOLUTION INTRAMUSCULAR; INTRAVENOUS AS NEEDED
Status: DISCONTINUED | OUTPATIENT
Start: 2022-10-03 | End: 2022-10-03 | Stop reason: HOSPADM

## 2022-10-03 RX ORDER — SODIUM CHLORIDE, SODIUM LACTATE, POTASSIUM CHLORIDE, CALCIUM CHLORIDE 600; 310; 30; 20 MG/100ML; MG/100ML; MG/100ML; MG/100ML
20 INJECTION, SOLUTION INTRAVENOUS CONTINUOUS
Status: DISCONTINUED | OUTPATIENT
Start: 2022-10-03 | End: 2022-10-03 | Stop reason: HOSPADM

## 2022-10-03 RX ORDER — OXYCODONE AND ACETAMINOPHEN 5; 325 MG/1; MG/1
1 TABLET ORAL
Qty: 20 TABLET | Refills: 0 | Status: SHIPPED | OUTPATIENT
Start: 2022-10-03 | End: 2022-10-10

## 2022-10-03 RX ORDER — METOPROLOL TARTRATE 5 MG/5ML
2.5 INJECTION INTRAVENOUS
Status: DISCONTINUED | OUTPATIENT
Start: 2022-10-03 | End: 2022-10-03 | Stop reason: HOSPADM

## 2022-10-03 RX ORDER — FENTANYL CITRATE 50 UG/ML
INJECTION, SOLUTION INTRAMUSCULAR; INTRAVENOUS AS NEEDED
Status: DISCONTINUED | OUTPATIENT
Start: 2022-10-03 | End: 2022-10-03 | Stop reason: HOSPADM

## 2022-10-03 RX ORDER — MIDAZOLAM HYDROCHLORIDE 1 MG/ML
1 INJECTION, SOLUTION INTRAMUSCULAR; INTRAVENOUS AS NEEDED
Status: DISCONTINUED | OUTPATIENT
Start: 2022-10-03 | End: 2022-10-03 | Stop reason: HOSPADM

## 2022-10-03 RX ORDER — KETOROLAC TROMETHAMINE 30 MG/ML
INJECTION, SOLUTION INTRAMUSCULAR; INTRAVENOUS AS NEEDED
Status: DISCONTINUED | OUTPATIENT
Start: 2022-10-03 | End: 2022-10-03 | Stop reason: HOSPADM

## 2022-10-03 RX ORDER — ONDANSETRON 2 MG/ML
4 INJECTION INTRAMUSCULAR; INTRAVENOUS AS NEEDED
Status: DISCONTINUED | OUTPATIENT
Start: 2022-10-03 | End: 2022-10-03 | Stop reason: HOSPADM

## 2022-10-03 RX ORDER — ONDANSETRON 2 MG/ML
INJECTION INTRAMUSCULAR; INTRAVENOUS AS NEEDED
Status: DISCONTINUED | OUTPATIENT
Start: 2022-10-03 | End: 2022-10-03 | Stop reason: HOSPADM

## 2022-10-03 RX ORDER — MORPHINE SULFATE 2 MG/ML
2 INJECTION, SOLUTION INTRAMUSCULAR; INTRAVENOUS
Status: DISCONTINUED | OUTPATIENT
Start: 2022-10-03 | End: 2022-10-03 | Stop reason: HOSPADM

## 2022-10-03 RX ORDER — LIDOCAINE HYDROCHLORIDE 20 MG/ML
INJECTION, SOLUTION EPIDURAL; INFILTRATION; INTRACAUDAL; PERINEURAL AS NEEDED
Status: DISCONTINUED | OUTPATIENT
Start: 2022-10-03 | End: 2022-10-03 | Stop reason: HOSPADM

## 2022-10-03 RX ADMIN — KETOROLAC TROMETHAMINE 30 MG: 30 INJECTION, SOLUTION INTRAMUSCULAR at 09:39

## 2022-10-03 RX ADMIN — SODIUM CHLORIDE, POTASSIUM CHLORIDE, SODIUM LACTATE AND CALCIUM CHLORIDE 20 ML/HR: 600; 310; 30; 20 INJECTION, SOLUTION INTRAVENOUS at 08:20

## 2022-10-03 RX ADMIN — SODIUM CHLORIDE, POTASSIUM CHLORIDE, SODIUM LACTATE AND CALCIUM CHLORIDE: 600; 310; 30; 20 INJECTION, SOLUTION INTRAVENOUS at 08:53

## 2022-10-03 RX ADMIN — PROPOFOL 150 MG: 10 INJECTION, EMULSION INTRAVENOUS at 09:10

## 2022-10-03 RX ADMIN — FENTANYL CITRATE 50 MCG: 50 INJECTION INTRAMUSCULAR; INTRAVENOUS at 10:19

## 2022-10-03 RX ADMIN — LIDOCAINE HYDROCHLORIDE 40 MG: 20 INJECTION, SOLUTION EPIDURAL; INFILTRATION; INTRACAUDAL; PERINEURAL at 09:10

## 2022-10-03 RX ADMIN — FENTANYL CITRATE 100 MCG: 50 INJECTION, SOLUTION INTRAMUSCULAR; INTRAVENOUS at 09:07

## 2022-10-03 RX ADMIN — MIDAZOLAM HYDROCHLORIDE 2 MG: 2 INJECTION, SOLUTION INTRAMUSCULAR; INTRAVENOUS at 09:02

## 2022-10-03 RX ADMIN — DEXAMETHASONE SODIUM PHOSPHATE 4 MG: 4 INJECTION, SOLUTION INTRA-ARTICULAR; INTRALESIONAL; INTRAMUSCULAR; INTRAVENOUS; SOFT TISSUE at 09:17

## 2022-10-03 RX ADMIN — ONDANSETRON 4 MG: 2 INJECTION INTRAMUSCULAR; INTRAVENOUS at 09:17

## 2022-10-03 NOTE — PERIOP NOTES
Patient states okay to review and give discharge instructions to  her  [facility].

## 2022-10-03 NOTE — ANESTHESIA POSTPROCEDURE EVALUATION
Procedure(s): HYSTEROSCOPY,  DIALTION AND CURETTAGE WITH ENDOMETRIAL EVALUATION. general    Anesthesia Post Evaluation      Multimodal analgesia: multimodal analgesia used between 6 hours prior to anesthesia start to PACU discharge  Patient location during evaluation: PACU  Patient participation: complete - patient participated  Level of consciousness: awake  Pain score: 0  Pain management: adequate  Airway patency: patent  Anesthetic complications: no  Cardiovascular status: acceptable  Respiratory status: acceptable  Hydration status: acceptable  Post anesthesia nausea and vomiting:  controlled  Final Post Anesthesia Temperature Assessment:  Normothermia (36.0-37.5 degrees C)      INITIAL Post-op Vital signs:   Vitals Value Taken Time   /96 10/03/22 1030   Temp 36.7 °C (98.1 °F) 10/03/22 1030   Pulse 67 10/03/22 1031   Resp 19 10/03/22 1031   SpO2 98 % 10/03/22 1031   Vitals shown include unvalidated device data.

## 2022-10-03 NOTE — INTERVAL H&P NOTE
Update History & Physical    The Patient's History and Physical of September 20, 2022 was reviewed with the patient and I examined the patient. There was no change. The surgical site was confirmed by the patient and me. Plan:  The risk, benefits, expected outcome, and alternative to the recommended procedure have been discussed with the patient. Patient understands and wants to proceed with the procedure.     Electronically signed by Merla Meigs, MD on 10/3/2022 at 9:07 AM

## 2022-10-03 NOTE — INTERVAL H&P NOTE
Update History & Physical    The Patient's History and Physical of September 20, 2022 was reviewed with the patient and I examined the patient. Patient continues to experience irregular cycles, history of uterine fibroids. Desires to conceived. Patient unable to tolerate in office in office evaluation of endometrium. Plan for operative hsysteroscopy with endometrial evaluation , possible removal of polyp, possible removal of fibroid, D&C . Patient previously counseled regarding procedure and consented. There was no change. The surgical site was confirmed by the patient and me. Plan:  The risk, benefits, expected outcome, and alternative to the recommended procedure have been discussed with the patient. Patient understands and wants to proceed with the procedure.     Electronically signed by Sarah Armenta MD on 10/3/2022 at 9:20 AM

## 2022-10-03 NOTE — PROGRESS NOTES
Please excuse the above patient from all related work activity for October 3 and 4th. Patient has been seen and evaluated and will be cleared to return to duty on October 5th 2022.

## 2022-10-03 NOTE — OP NOTES
Operative Note    Patient: Maggie Weiss MRN: [id number]  SSN: [id number]    YOB: 1990  Age: 34 y.o. Sex: female      Date of Surgery: 10/3/2022     Preoperative Diagnosis: Irregular menses [N92.6]    Postoperative Diagnosis:  Irregular menses [N92.6]    Surgeon(s) and Role:     Juliane Edwards MD - Primary     Anesthesia:  General     Procedure: Procedure(s): HYSTEROSCOPY,  DIALTION AND CURETTAGE WITH ENDOMETRIAL EVALUATION     Indications: The patient was admitted to the hospital with history of irregular cycles, uterine fibroids. The patient now presents for operative hysteroscopy with endometrial evlauation after discussing therapeutic alternatives as patient unable to tolerate office evaluation. Discussed the risk of surgery including infection, hematoma, bleeding,  recurrence or persistence of symptoms  and the risks of general anesthetic including myocardial infarction, cerebrovascular accident, sudden death, or even reaction to anesthetic medications. The patient understands the risks, any and all questions were answered to the patient's satisfaction and they freely signed the consent for operation. Procedure in Detail:  The patient was taken to the operating room where she was placed in the supine position. After undergoing adequate general  anesthesia, she was placed up in the [facility] laparoscopy stirrups in the dorsal lithotomy position. The patient was then prepped and draped in the usual fashion and the bladder drained . Attention was first turned to the vagina where the speculum was placed in the vagina. The anterior lip of the cervix was grasped with a single-toothed tenaculum. The cervix was dilated up to admit a  hysteroscope using normal saline for distention media. The above findings were noted. The resectoscope was introduced to biopsy the endometrium. Scope removed. Using a curet, a gentle curettage was performed and specimen was sent to pathology.   All instruments removed from the vagina. Cervix hemostatic   All counts correct x 2. Patient was awaken and taken to the PACU in stable condition.   She will be discharge today with office follow up    Findings:  No direct intracavity lesions, normal ostia     Estimated Blood Loss:  minimal     Complications: none    Assistant: none    Specimens:   ID Type Source Tests Collected by Time Destination   1 : Endometrial Curettings Preservative Uterus  Brigitte Blackwell MD 10/3/2022 7196 Pathology                Drains: NA                Implants: * No implants in log *

## 2022-10-03 NOTE — ANESTHESIA PREPROCEDURE EVALUATION
Relevant Problems   CARDIOVASCULAR   (+) Hypertension      ENDOCRINE   (+) Obesity, Class II, BMI 35-39.9, with comorbidity   (+) Severe obesity (BMI 35.0-39. 9) with comorbidity ([address]       Anesthetic History   No history of anesthetic complications            Review of Systems / Medical History  Patient summary reviewed, nursing notes reviewed and pertinent labs reviewed    Pulmonary  Within defined limits                 Neuro/Psych   Within defined limits           Cardiovascular    Hypertension: well controlled          Hyperlipidemia    Exercise tolerance: >4 METS  Comments: htn controlled without meds    Echo:  Left ventricle: Systolic function was normal. Ejection fraction was estimated to be 55 %. There were no regional wall motion abnormalities. GI/Hepatic/Renal  Within defined limits              Endo/Other        Morbid obesity and anemia     Other Findings            Past Medical History:   Diagnosis Date    Adverse effect of anesthesia     pt states it took \"at least 2 hours to wake up\" after last surgery    Hypercholesterolemia     Hypertension 1/28/2011    IUD (intrauterine device) in place 07/23/2015    removed 2015    LSIL (low grade squamous intraepithelial lesion) on Pap smear 12/4/12    Menorrhagia with regular cycle 7/2/2013    Obesity, Class II, BMI 35-39.9 9/23/13    BMI 37    Secondary dysmenorrhea 7/2/2013       Past Surgical History:   Procedure Laterality Date    HX COLPOSCOPY  2012    HX DILATION AND CURETTAGE  2008    pregnancy related    HX GASTRIC BYPASS      sleeve August 2020    HX OTHER SURGICAL  11/2013    laparoscopy/exploratory-uterine issues, checking endometriosis patient stated approx.  2012    HX WISDOM TEETH EXTRACTION      VT CONIZATION CERVIX,LOOP ELECTRD N/A 1/26/2018    LOOP ELECTRODE EXCISION PROCEDURE OF CERVIX (LEEP) performed by Poppy Marin MD [facility]       Current Outpatient Medications   Medication Instructions    cefdinir (OMNICEF) 300 mg, Oral, 2 TIMES DAILY    ibuprofen (MOTRIN) 600 mg, Oral, EVERY 6 HOURS AS NEEDED    methylPREDNISolone (MEDROL DOSEPACK) 4 mg tablet Use as directed    multivitamin (ONE A DAY) tablet 1 Tablet, DAILY    norgestimate-ethinyl estradioL (ORTHO-CYCLEN, SPRINTEC) 0.25-35 mg-mcg tab 1 Tablet, Oral, DAILY       No current facility-administered medications for this visit. No current outpatient medications on file. Facility-Administered Medications Ordered in Other Visits   Medication Dose Route Frequency    lactated Ringers infusion  20 mL/hr IntraVENous CONTINUOUS       No data found.     Lab Results   Component Value Date/Time    WBC 4.6 09/29/2022 08:38 AM    HGB 10.9 (L) 09/29/2022 08:38 AM    HCT 34.5 (L) 09/29/2022 08:38 AM    PLATELET 076 74/80/3181 08:38 AM    MCV 82.5 09/29/2022 08:38 AM     Lab Results   Component Value Date/Time    Sodium 140 05/18/2021 10:10 AM    Potassium 4.1 05/18/2021 10:10 AM    Chloride 109 (H) 05/18/2021 10:10 AM    CO2 27 05/18/2021 10:10 AM    Anion gap 4 (L) 05/18/2021 10:10 AM    Glucose 78 05/18/2021 10:10 AM    BUN 10 05/18/2021 10:10 AM    Creatinine 0.85 05/18/2021 10:10 AM    BUN/Creatinine ratio 12 05/18/2021 10:10 AM    GFR est AA >60 05/18/2021 10:10 AM    GFR est non-AA >60 05/18/2021 10:10 AM    Calcium 9.2 05/18/2021 10:10 AM     No results found for: APTT, PTP, INR, INREXT, INREXT  Lab Results   Component Value Date/Time    Glucose 78 05/18/2021 10:10 AM     Physical Exam    Airway  Mallampati: I  TM Distance: 4 - 6 cm  Neck ROM: normal range of motion   Mouth opening: Normal     Cardiovascular    Rhythm: regular  Rate: normal         Dental  No notable dental hx       Pulmonary  Breath sounds clear to auscultation               Abdominal  GI exam deferred       Other Findings            Anesthetic Plan    ASA: 2  Anesthesia type: general          Induction: Intravenous  Anesthetic plan and risks discussed with: Patient and Family

## 2022-10-20 ENCOUNTER — OFFICE VISIT (OUTPATIENT)
Dept: OBGYN CLINIC | Age: 32
End: 2022-10-20
Payer: COMMERCIAL

## 2022-10-20 VITALS
RESPIRATION RATE: 16 BRPM | BODY MASS INDEX: 33.9 KG/M2 | DIASTOLIC BLOOD PRESSURE: 85 MMHG | OXYGEN SATURATION: 100 % | WEIGHT: 216 LBS | HEART RATE: 87 BPM | SYSTOLIC BLOOD PRESSURE: 145 MMHG | HEIGHT: 67 IN

## 2022-10-20 DIAGNOSIS — N83.9 PROBLEMS WITH OVULATION: Primary | ICD-10-CM

## 2022-10-20 PROCEDURE — 3078F DIAST BP <80 MM HG: CPT | Performed by: OBSTETRICS & GYNECOLOGY

## 2022-10-20 PROCEDURE — 3074F SYST BP LT 130 MM HG: CPT | Performed by: OBSTETRICS & GYNECOLOGY

## 2022-10-20 PROCEDURE — 99214 OFFICE O/P EST MOD 30 MIN: CPT | Performed by: OBSTETRICS & GYNECOLOGY

## 2022-10-20 RX ORDER — CLOMIPHENE CITRATE 50 MG/1
50 TABLET ORAL DAILY
Qty: 4 TABLET | Refills: 0 | Status: SHIPPED | OUTPATIENT
Start: 2022-10-20 | End: 2022-10-24

## 2022-10-31 NOTE — PROGRESS NOTES
Rafael Cerna is a 34 y.o. female, , Patient's last menstrual period was 2022., who presents today for the following:  Chief Complaint   Patient presents with    Post OP Follow Up        Allergies   Allergen Reactions    Apple Other (comments)     Patient stated causes itchy throat    Lisinopril Swelling     Lips swell       Current Outpatient Medications   Medication Sig    ibuprofen (MOTRIN) 800 mg tablet Take 1 Tablet by mouth every eight (8) hours as needed for Pain. Indications: pain (Patient not taking: Reported on 10/20/2022)    ibuprofen (MOTRIN) 600 mg tablet Take 1 Tablet by mouth every six (6) hours as needed for Pain. (Patient not taking: No sig reported)    norgestimate-ethinyl estradioL (ORTHO-CYCLEN, SPRINTEC) 0.25-35 mg-mcg tab Take 1 Tablet by mouth daily. (Patient not taking: No sig reported)    multivitamin (ONE A DAY) tablet Take 1 Tab by mouth daily. (Patient not taking: No sig reported)     No current facility-administered medications for this visit. Past Medical History:   Diagnosis Date    Adverse effect of anesthesia     pt states it took \"at least 2 hours to wake up\" after last surgery    Hypercholesterolemia     Hypertension 2011    IUD (intrauterine device) in place 2015    removed     LSIL (low grade squamous intraepithelial lesion) on Pap smear 12    Menorrhagia with regular cycle 2013    Obesity, Class II, BMI 35-39.9 13    BMI 37    Secondary dysmenorrhea 2013       Past Surgical History:   Procedure Laterality Date    HX COLPOSCOPY      HX DILATION AND CURETTAGE      pregnancy related    HX GASTRIC BYPASS      sleeve 2020    HX OTHER SURGICAL  2013    laparoscopy/exploratory-uterine issues, checking endometriosis patient stated approx.      HX WISDOM TEETH EXTRACTION      OR CONIZATION CERVIX,LOOP ELECTRD N/A 2018    LOOP ELECTRODE EXCISION PROCEDURE OF CERVIX (LEEP) performed by Bibi Kinney MD at [facility]       Family History   Problem Relation Age of Onset    Hypertension Mother     Hypertension Father     Cancer Father         prostate    Hypertension Sister     Hypertension Maternal Aunt     Diabetes Maternal Grandmother     Hypertension Maternal Grandmother     Diabetes Maternal Grandfather     Hypertension Maternal Grandfather     Diabetes Paternal Grandmother     Hypertension Paternal Grandmother     Diabetes Paternal Grandfather     Hypertension Paternal Grandfather        Social History     Socioeconomic History    Marital status:      Spouse name: Not on file    Number of children: Not on file    Years of education: Not on file    Highest education level: Not on file   Occupational History    Not on file   Tobacco Use    Smoking status: Never    Smokeless tobacco: Never   Substance and Sexual Activity    Alcohol use: Yes     Alcohol/week: 1.0 standard drink     Types: 1 Shots of liquor per week     Comment: on special occasions    Drug use: No    Sexual activity: Yes     Partners: Male     Birth control/protection: None     Comment: Pill use began in July 2013. No other history of hormonal contraceptive use   Other Topics Concern    Not on file   Social History Narrative    Not on file     Social Determinants of Health     Financial Resource Strain: Not on file   Food Insecurity: Not on file   Transportation Needs: Not on file   Physical Activity: Not on file   Stress: Not on file   Social Connections: Not on file   Intimate Partner Violence: Not on file   Housing Stability: Not on file         Post OP Follow Up  Patient presents for follow   S/p operative hysteroscopy  Denies any complications following procedure    Pathology reveal  Endometrium, curettage:        Benign proliferative endometrium with scattered plasma cells   Results reviewed with patient  Significance discussed  Patient and spouse trying to conceive.     Review of Systems Constitutional: Negative. Respiratory: Negative. Cardiovascular: Negative. Gastrointestinal: Negative. Genitourinary: Negative. Musculoskeletal: Negative. Skin: Negative. Neurological: Negative. Endo/Heme/Allergies: Negative. Psychiatric/Behavioral: Negative. All other systems reviewed and are negative. BP (!) 145/85 (BP 1 Location: Left upper arm, BP Patient Position: Sitting)   Pulse 87   Resp 16   Ht 5' 7\" (1.702 m)   Wt 216 lb (98 kg)   LMP 09/29/2022   SpO2 100%   BMI 33.83 kg/m²    OBGyn Exam   PE:  Constitutional: General Appearance: healthy-appearing, well-nourished, well-developed, and well groomed. Psychiatric: Orientation: to time, place, and person. Mood and Affect: normal mood and affect and appropriate and active and alert. Abdomen: Auscultation/Inspection/Palpation: no tenderness and mass and non-distended. Hernia: none palpated. Female Genitalia: Vulva: no masses, atrophy, or lesions. Bladder/Urethra: no urethral discharge or mass and normal meatus and bladder non distended. Vagina no tenderness, erythema, cystocele, rectocele, abnormal vaginal discharge, or vesicle(s) or ulcers. Cervix: no discharge or cervical motion tenderness and grossly normal.     Uterus: mobile, non-tender, and no uterine prolapse. Adnexa/Parametria: no adnexal tenderness. 1. Problems with ovulation    - clomiPHENE (CLOMID) 50 mg tablet; Take 1 Tablet by mouth daily for 4 days. Indications: Start medication on the third day of cycle. Dispense: 4 Tablet; Refill: 0  Review of labs  Conception counseling provided to patient. All questions answered .   Total time spent with with patient > 30min

## 2022-11-28 DIAGNOSIS — N83.9 DISORDER OF OVULATION: Primary | ICD-10-CM

## 2022-11-28 RX ORDER — CLOMIPHENE CITRATE 50 MG/1
50 TABLET ORAL DAILY
Qty: 4 TABLET | Refills: 0 | Status: SHIPPED | OUTPATIENT
Start: 2022-11-28 | End: 2022-12-02

## 2023-02-07 DIAGNOSIS — N83.9 DISORDER OF OVULATION: Primary | ICD-10-CM

## 2023-02-07 RX ORDER — CLOMIPHENE CITRATE 50 MG/1
50 TABLET ORAL DAILY
Qty: 4 TABLET | Refills: 0 | Status: SHIPPED | OUTPATIENT
Start: 2023-02-07 | End: 2023-02-11

## 2023-03-07 ENCOUNTER — OFFICE VISIT (OUTPATIENT)
Dept: FAMILY MEDICINE CLINIC | Age: 33
End: 2023-03-07
Payer: COMMERCIAL

## 2023-03-07 VITALS
BODY MASS INDEX: 35.79 KG/M2 | TEMPERATURE: 98.2 F | RESPIRATION RATE: 16 BRPM | DIASTOLIC BLOOD PRESSURE: 84 MMHG | HEART RATE: 97 BPM | SYSTOLIC BLOOD PRESSURE: 126 MMHG | WEIGHT: 228 LBS | OXYGEN SATURATION: 99 % | HEIGHT: 67 IN

## 2023-03-07 DIAGNOSIS — N94.5 SECONDARY DYSMENORRHEA: ICD-10-CM

## 2023-03-07 DIAGNOSIS — Z11.59 NEED FOR HEPATITIS C SCREENING TEST: ICD-10-CM

## 2023-03-07 DIAGNOSIS — E78.2 MIXED HYPERLIPIDEMIA: ICD-10-CM

## 2023-03-07 DIAGNOSIS — Z76.89 ESTABLISHING CARE WITH NEW DOCTOR, ENCOUNTER FOR: ICD-10-CM

## 2023-03-07 DIAGNOSIS — Z90.3 H/O GASTRIC SLEEVE: ICD-10-CM

## 2023-03-07 DIAGNOSIS — E55.9 VITAMIN D DEFICIENCY: ICD-10-CM

## 2023-03-07 DIAGNOSIS — D50.8 OTHER IRON DEFICIENCY ANEMIA: ICD-10-CM

## 2023-03-07 DIAGNOSIS — Z01.419 WELL WOMAN EXAM: Primary | ICD-10-CM

## 2023-03-07 DIAGNOSIS — E66.01 CLASS 2 SEVERE OBESITY DUE TO EXCESS CALORIES WITH SERIOUS COMORBIDITY AND BODY MASS INDEX (BMI) OF 35.0 TO 35.9 IN ADULT (HCC): ICD-10-CM

## 2023-03-07 PROBLEM — Z51.81 ENCOUNTER FOR MONITORING ORAL HORMONAL THERAPY FOR HEAVY MENSES: Status: RESOLVED | Noted: 2021-05-27 | Resolved: 2023-03-07

## 2023-03-07 PROBLEM — E61.1 IRON DEFICIENCY: Status: ACTIVE | Noted: 2022-03-29

## 2023-03-07 PROBLEM — Z79.3 ENCOUNTER FOR MONITORING ORAL HORMONAL THERAPY FOR HEAVY MENSES: Status: RESOLVED | Noted: 2021-05-27 | Resolved: 2023-03-07

## 2023-03-07 PROBLEM — N92.0 ENCOUNTER FOR MONITORING ORAL HORMONAL THERAPY FOR HEAVY MENSES: Status: RESOLVED | Noted: 2021-05-27 | Resolved: 2023-03-07

## 2023-03-07 PROCEDURE — 99385 PREV VISIT NEW AGE 18-39: CPT | Performed by: FAMILY MEDICINE

## 2023-03-07 NOTE — PROGRESS NOTES
*ATTENTION:  This note has been created by a medical student for educational purposes only. Please do not refer to the content of this note for clinical decision-making, billing, or other purposes. Please see attending physicians note to obtain clinical information on this patient. *       HPI:  Ingrid Castellano is a 34 y.o. female presenting for to [facility] care. No reported concerns. Age at which menses began: 8 y/o   Last menstrual period was 22  Length of periods: 4-7 days  Number of days between periods: 21 (total cycle time 25-28d), irregular  Menstrual flow: very heavy 3d (soaking through multiple pads), then taper off        Sexually active?: yes, with   Number of sexual partners: 1  Type of sexual partners: male  Method of family planning: none - trying to get pregnant (on clomid) since the beginning of the year    Diet: yogurt/yogurt shakes/premier shake for breakfast, packs lunch (leftover), dinner proteins air-fried and vegetables; snacks - cheese and crackers; cut back on sweets, and out sodas, tried to do one cup of juice/day; does not add salt to her foods  Vitamin supplements - none, just iron gummies; was told that she can stop taking vitamins because levels have been good  Still follows up with GI after  gastric surgery    Exercise: no time      Allergies- reviewed: Allergies   Allergen Reactions    Lisinopril Swelling and Angioedema     Lips swell  Reaction Type: Allergy  Lips swell    Apple Other (comments)     Patient stated causes itchy throat         Medications- reviewed:   Current Outpatient Medications   Medication Sig    ibuprofen (MOTRIN) 800 mg tablet Take 1 Tablet by mouth every eight (8) hours as needed for Pain. Indications: pain    ibuprofen (MOTRIN) 600 mg tablet Take 1 Tablet by mouth every six (6) hours as needed for Pain. (Patient not taking: Reported on 3/7/2023)    multivitamin (ONE A DAY) tablet Take 1 Tab by mouth daily.  (Patient not taking: No sig reported)     No current facility-administered medications for this visit. Past Medical History- reviewed:  Past Medical History:   Diagnosis Date    Adverse effect of anesthesia     pt states it took \"at least 2 hours to wake up\" after last surgery    Hypercholesterolemia     Hypertension 1/28/2011    IUD (intrauterine device) in place 07/23/2015    removed 2015    LSIL (low grade squamous intraepithelial lesion) on Pap smear 12/4/12    Menorrhagia with regular cycle 7/2/2013    Obesity, Class II, BMI 35-39.9 9/23/13    BMI 37    Secondary dysmenorrhea 7/2/2013         Past Surgical History- reviewed:   Past Surgical History:   Procedure Laterality Date    HX COLPOSCOPY  2012    HX DILATION AND CURETTAGE  2008    pregnancy related    HX GASTRIC BYPASS      sleeve August 2020    HX OTHER SURGICAL  11/2013    laparoscopy/exploratory-uterine issues, checking endometriosis patient stated approx.  2012    HX WISDOM TEETH EXTRACTION      WI CONIZATION CERVIX W/WO D&C RPR ELTRD EXC N/A 1/26/2018    LOOP ELECTRODE EXCISION PROCEDURE OF CERVIX (LEEP) performed by Sea Lechuga MD at [facility]IN OR         Family History - reviewed:  Family History   Problem Relation Age of Onset    Hypertension Mother     Hypertension Father     Cancer Father         prostate    Hypertension Sister     Hypertension Maternal Aunt     Diabetes Maternal Grandmother     Hypertension Maternal Grandmother     Diabetes Maternal Grandfather     Hypertension Maternal Grandfather     Diabetes Paternal Grandmother     Hypertension Paternal Grandmother     Diabetes Paternal Grandfather     Hypertension Paternal Grandfather    Father - kidney failure needing dialysis      Social History - reviewed:  Social History     Socioeconomic History    Marital status:      Spouse name: Not on file    Number of children: Not on file    Years of education: Not on file    Highest education level: Not on file   Occupational History Not on file   Tobacco Use    Smoking status: Never    Smokeless tobacco: Never   Vaping Use    Vaping Use: Never used   Substance and Sexual Activity    Alcohol use: Yes     Comment: Only on special occasions    Drug use: No    Sexual activity: Yes     Partners: Male     Birth control/protection: None     Comment: Pill use began in July 2013. No other history of hormonal contraceptive use   Other Topics Concern    Not on file   Social History Narrative    Not on file     Social Determinants of Health     Financial Resource Strain: Not on file   Food Insecurity: Not on file   Transportation Needs: Not on file   Physical Activity: Insufficiently Active    Days of Exercise per Week: 2 days    Minutes of Exercise per Session: 30 min   Stress: Not on file   Social Connections: Not on file   Intimate Partner Violence: Not At Risk    Fear of Current or Ex-Partner: No    Emotionally Abused: No    Physically Abused: No    Sexually Abused: No   Housing Stability: Not on file         Immunizations - reviewed:   Immunization History   Administered Date(s) Administered    Influenza Vaccine 09/05/2013, 01/01/2018    Influenza Vaccine Split 11/05/2010, 11/09/2011    Influenza, FLUARIX, FLULAVAL, Cora Emms (age 10 mo+) AND AFLURIA, (age 1 y+), PF, 0.5mL 10/30/2018    TB Skin Test (PPD) Intradermal 06/02/2016    TDAP Vaccine 11/09/2011     Flu: 10/2022  Tdap: last one 2011, unsure if had any since      Health Maintenance reviewed -  Pap smear (cytology) 1/12/21 - negative for malignancy - due 2024  HIV testing 1/16/18 - negative   Hepatitis C testing - will do today      Review of Systems   Review of Systems   Constitutional:  Negative for chills, diaphoresis, fever, malaise/fatigue and weight loss. HENT:  Positive for sore throat. Eyes:  Negative for blurred vision and photophobia. Respiratory:  Negative for cough and shortness of breath. Cardiovascular:  Negative for chest pain and palpitations.    Gastrointestinal: Negative for abdominal pain, blood in stool, constipation, diarrhea, heartburn, nausea and vomiting. Genitourinary:  Negative for dysuria, frequency, hematuria and urgency. Musculoskeletal:  Negative for myalgias. Neurological:  Negative for dizziness, weakness and headaches. Psychiatric/Behavioral:  Negative for depression. Physical Exam  Visit Vitals  /84 (BP 1 Location: Right arm, BP Patient Position: Sitting)   Pulse 97   Temp 98.2 °F (36.8 °C) (Temporal)   Resp 16   Ht 5' 7\" (1.702 m)   Wt 228 lb (103.4 kg)   LMP 02/19/2023   SpO2 99%   BMI 35.71 kg/m²       Physical Exam  Constitutional:       Appearance: Normal appearance. She is obese. HENT:      Head: Normocephalic and atraumatic. Right Ear: External ear normal.      Left Ear: External ear normal.      Nose: No rhinorrhea. Comments: Slightly erythematous turbinates     Mouth/Throat:      Pharynx: Posterior oropharyngeal erythema (back of oropharynx) present. Eyes:      Extraocular Movements: Extraocular movements intact. Pupils: Pupils are equal, round, and reactive to light. Cardiovascular:      Rate and Rhythm: Normal rate and regular rhythm. Pulses: Normal pulses. Heart sounds: Normal heart sounds. Pulmonary:      Effort: Pulmonary effort is normal.      Breath sounds: Normal breath sounds. Abdominal:      General: There is no distension. Palpations: Abdomen is soft. Tenderness: There is no abdominal tenderness. There is no guarding. Hernia: No hernia is present. Musculoskeletal:         General: Normal range of motion. Cervical back: Normal range of motion and neck supple. Skin:     General: Skin is warm and dry. Neurological:      General: No focal deficit present. Mental Status: She is alert and oriented to person, place, and time. Mental status is at baseline.    Psychiatric:         Mood and Affect: Mood normal.         Behavior: Behavior normal. Assessment/Plan:  New Patient Well Exam  - hep C labwork since none was done in the past  Obesity  - recheck A1c due to weight gain and history of pre-diabetes  - discussed dietary changes  - will meet with nutritionist through her GI team  - currently medication weight management options are on hold while patient is trying to get pregnant  Hypertension - resolved  - resolved since weight loss surgery  - continue monitoring and working on weight loss  Hyperlipidemia  - lipid panel today to reassess levels since none were done since 2019  Iron deficient anemia  - following up for iron infusion at [facility] this month    Discussed with Dr. Marge Lees    I have discussed the diagnosis with the patient and the intended plan as seen in the above orders. Patient verbalized understanding of the plan and agrees with the plan. The patient has received an after-visit summary and questions were answered concerning future plans. I have discussed medication side effects and warnings with the patient as well. Informed patient to return to the office if new symptoms arise.       Karo Robison  MS3, [employer]

## 2023-03-07 NOTE — PROGRESS NOTES
History of Present Illness:     Chief Complaint   Patient presents with    Establish Care     Pt is a 34y.o. year old female    Presents to clinic to establish care. Previous PCP leaving practice    Followed by GYN  Last pap normal in 2021  Heavy menstrual bleeding, 4-7 days  Needs to wear briefs on first days  Complicated by anemia  Receiving iron infusions for her anemia (Followed by [facility])  Taking iron supplements    Currently on Clomed, trying to get pregnant    Prior wt loss surgery  S/p sleeve gastrectomy 8/6/2020  Got down to 180 lbs, now back at 228 lbs  Difficulty maintaining diet and gaining weight  No sweets but does drink juice  No added salt, cut back on carbs  Followed by GI regularly    No exercise     HTN, no longer req medications following wt loss surgery     I have reviewed patient's history as detailed below:    Past Medical History:   Diagnosis Date    Adverse effect of anesthesia     pt states it took \"at least 2 hours to wake up\" after last surgery    Hypercholesterolemia     Hypertension 1/28/2011    IUD (intrauterine device) in place 07/23/2015    removed 2015    LSIL (low grade squamous intraepithelial lesion) on Pap smear 12/4/12    Menorrhagia with regular cycle 7/2/2013    Obesity, Class II, BMI 35-39.9 9/23/13    BMI 37    Secondary dysmenorrhea 7/2/2013         Past Surgical History:   Procedure Laterality Date    HX COLPOSCOPY  2012    HX DILATION AND CURETTAGE  2008    pregnancy related    HX GASTRIC BYPASS      sleeve August 2020    HX OTHER SURGICAL  11/2013    laparoscopy/exploratory-uterine issues, checking endometriosis patient stated approx.  2012    HX WISDOM TEETH EXTRACTION      LA CONIZATION CERVIX W/WO D&C RPR ELTRD EXC N/A 1/26/2018    LOOP ELECTRODE EXCISION PROCEDURE OF CERVIX (LEEP) performed by Gretta Caal MD at [facility] MA[facility]         Family History   Problem Relation Age of Onset    Hypertension Mother     Hypertension Father     Cancer Father prostate    Hypertension Sister     Hypertension Maternal Aunt     Diabetes Maternal Grandmother     Hypertension Maternal Grandmother     Diabetes Maternal Grandfather     Hypertension Maternal Grandfather     Diabetes Paternal Grandmother     Hypertension Paternal Grandmother     Diabetes Paternal Grandfather     Hypertension Paternal Grandfather          Social History     Socioeconomic History    Marital status:      Spouse name: Not on file    Number of children: Not on file    Years of education: Not on file    Highest education level: Not on file   Occupational History    Not on file   Tobacco Use    Smoking status: Never    Smokeless tobacco: Never   Vaping Use    Vaping Use: Never used   Substance and Sexual Activity    Alcohol use: Yes     Comment: Only on special occasions    Drug use: No    Sexual activity: Yes     Partners: Male     Birth control/protection: None     Comment: Pill use began in July 2013. No other history of hormonal contraceptive use   Other Topics Concern    Not on file   Social History Narrative    Not on file     Social Determinants of Health     Financial Resource Strain: Not on file   Food Insecurity: Not on file   Transportation Needs: Not on file   Physical Activity: Insufficiently Active    Days of Exercise per Week: 2 days    Minutes of Exercise per Session: 30 min   Stress: Not on file   Social Connections: Not on file   Intimate Partner Violence: Not At Risk    Fear of Current or Ex-Partner: No    Emotionally Abused: No    Physically Abused: No    Sexually Abused: No   Housing Stability: Not on file         Current Outpatient Medications on File Prior to Visit   Medication Sig Dispense Refill    ibuprofen (MOTRIN) 800 mg tablet Take 1 Tablet by mouth every eight (8) hours as needed for Pain. Indications: pain 30 Tablet 0    ibuprofen (MOTRIN) 600 mg tablet Take 1 Tablet by mouth every six (6) hours as needed for Pain.  (Patient not taking: Reported on 3/7/2023) 20 Tablet 0    multivitamin (ONE A DAY) tablet Take 1 Tab by mouth daily. (Patient not taking: No sig reported)       No current facility-administered medications on file prior to visit. Allergies: Allergies   Allergen Reactions    Lisinopril Swelling and Angioedema     Lips swell  Reaction Type: Allergy  Lips swell    Apple Other (comments)     Patient stated causes itchy throat         Review of systems:  Items bolded if positive. Constitutional: Fever, chills, night sweats, weight loss, lymphadenopathy, fatigue  HEENT: Vision change, eye pain, rhinorrhea, sinus pain, epistaxis, dysphagia, change in hearing, tinnitus, vertigo.    Endocrine: Weight change, heat/ cold intolerance, tremor, insomnia, polyuria, polydipsia, polyphagia, abnl hair growth, nail changes  Cardiovascular: Chest pain, palpitations, syncope, lower extremity edema, orthopnea, paroxysmal nocturnal dyspnea  Pulmonary: Shortness of breath, dyspnea on exertion, cough, hemoptysis, wheezing  GI: Nausea, vomiting, diarrhea, melena, hematochezia, change in appetite, abdominal pain, change in bowel habits or stools  : Dysuria, frequency, urgency, incontinence, hematuria, nocturia  Musculoskeletal: joint swelling or pain, muscle pain, back pain  Skin:  Rash, New/growing/changing skin lesions  Neurologic: Headache, muscle weakness, paresthesias, anesthesia, ataxia, change in speech, change in gait   Psychiatric: depression, anxiety, hallucinations, guillaume, SI/HI        Objective:     Vitals:    03/07/23 0842   BP: 126/84   Pulse: 97   Resp: 16   Temp: 98.2 °F (36.8 °C)   TempSrc: Temporal   SpO2: 99%   Weight: 228 lb (103.4 kg)   Height: 5' 7\" (1.702 m)       Physical Exam:  General appearance - alert, well appearing, and in no distress and overweight  Mental status - alert, oriented to person, place, and time, normal mood, behavior, speech, dress, motor activity, and thought processes  Chest - clear to auscultation, no wheezes, rales or rhonchi, symmetric air entry  Heart - normal rate, regular rhythm, normal S1, S2, no murmurs, rubs, clicks or gallops  Neurological - alert, oriented, normal speech, no focal findings or movement disorder noted  Extremities - peripheral pulses normal, no pedal edema, no clubbing or cyanosis      Recent Labs:  No results found for this or any previous visit (from the past 12 hour(s)). Assessment and Plan:   Pt is a 34y.o. year old female,      ICD-10-CM ICD-9-CM    1. Well woman exam  Z01.419 V72.31       2. Other iron deficiency anemia  D50.8 280.8       3. Class 2 severe obesity due to excess calories with serious comorbidity and body mass index (BMI) of 35.0 to 35.9 in adult ([facility])  E66.01 278.01     Z68.35 V85.35       4. H/O gastric sleeve  Z90.3 V15.29           Establish care/ well woman  GYN care UTD followed by GYN  Encouraged regular exercise  Updated vaccines     Iron def anemia 2/2 menorrhagia   Followed by [facility], receiving iron infusions  Will follow up this month  Reviewed last labs:  - Hgb 10.7 then 11.6 (12/2022)    Obesity, worsening  S/p gastric sleeve gastrectomy in 2020  Now gaining weight  Will check A1c, lipids, CMP, TSH today  Encouraged to re-establish with her nutritionist  Given she is actively working on becoming pregnant, we will avoid starting any new wt loss medications    HTN, resolved since wt loss    HLD, improved s/p wt loss surgery  Will rpt lipid panel    Hep C screening    Dysmenorrhea   Followed by GYN  Checking labs today  Advised against NSAIDs given her gastric sleeve  Tylenol PRN    Follow up in 3 mo    Jordan Mcgraw MD    I have discussed the diagnosis with the patient and the intended plan as seen in the above orders. The patient has received an after-visit summary and questions were answered concerning future plans.

## 2023-03-08 ENCOUNTER — HOSPITAL ENCOUNTER (EMERGENCY)
Age: 33
Discharge: HOME | End: 2023-03-08
Attending: STUDENT IN AN ORGANIZED HEALTH CARE EDUCATION/TRAINING PROGRAM
Payer: COMMERCIAL

## 2023-03-08 ENCOUNTER — PATIENT MESSAGE (OUTPATIENT)
Dept: FAMILY MEDICINE CLINIC | Age: 33
End: 2023-03-08

## 2023-03-08 VITALS
RESPIRATION RATE: 16 BRPM | HEART RATE: 104 BPM | OXYGEN SATURATION: 100 % | WEIGHT: 228 LBS | BODY MASS INDEX: 35.79 KG/M2 | SYSTOLIC BLOOD PRESSURE: 138 MMHG | TEMPERATURE: 98.6 F | HEIGHT: 67 IN | DIASTOLIC BLOOD PRESSURE: 68 MMHG

## 2023-03-08 DIAGNOSIS — J10.1 INFLUENZA A: Primary | ICD-10-CM

## 2023-03-08 DIAGNOSIS — Z90.3 H/O GASTRIC SLEEVE: ICD-10-CM

## 2023-03-08 DIAGNOSIS — E55.9 VITAMIN D DEFICIENCY: Primary | ICD-10-CM

## 2023-03-08 LAB
25(OH)D3 SERPL-MCNC: 17.6 NG/ML (ref 30–100)
ALBUMIN SERPL-MCNC: 3.8 G/DL (ref 3.5–5)
ALBUMIN/GLOB SERPL: 0.9 (ref 1.1–2.2)
ALP SERPL-CCNC: 59 U/L (ref 45–117)
ALT SERPL-CCNC: 16 U/L (ref 12–78)
ANION GAP SERPL CALC-SCNC: 1 MMOL/L (ref 5–15)
AST SERPL-CCNC: 13 U/L (ref 15–37)
BILIRUB SERPL-MCNC: 0.5 MG/DL (ref 0.2–1)
BUN SERPL-MCNC: 12 MG/DL (ref 6–20)
BUN/CREAT SERPL: 12 (ref 12–20)
CALCIUM SERPL-MCNC: 9.6 MG/DL (ref 8.5–10.1)
CHLORIDE SERPL-SCNC: 107 MMOL/L (ref 97–108)
CHOLEST SERPL-MCNC: 213 MG/DL
CO2 SERPL-SCNC: 30 MMOL/L (ref 21–32)
CREAT SERPL-MCNC: 0.98 MG/DL (ref 0.55–1.02)
DEPRECATED S PYO AG THROAT QL EIA: NEGATIVE
EST. AVERAGE GLUCOSE BLD GHB EST-MCNC: 108 MG/DL
FLUAV AG NPH QL IA: POSITIVE
FLUBV AG NOSE QL IA: NEGATIVE
GLOBULIN SER CALC-MCNC: 4.1 G/DL (ref 2–4)
GLUCOSE SERPL-MCNC: 79 MG/DL (ref 65–100)
HBA1C MFR BLD: 5.4 % (ref 4–5.6)
HCV AB SERPL QL IA: NONREACTIVE
HDLC SERPL-MCNC: 74 MG/DL
HDLC SERPL: 2.9 (ref 0–5)
LDLC SERPL CALC-MCNC: 116 MG/DL (ref 0–100)
POTASSIUM SERPL-SCNC: 4.3 MMOL/L (ref 3.5–5.1)
PROT SERPL-MCNC: 7.9 G/DL (ref 6.4–8.2)
SODIUM SERPL-SCNC: 138 MMOL/L (ref 136–145)
TRIGL SERPL-MCNC: 115 MG/DL (ref ?–150)
TSH SERPL DL<=0.05 MIU/L-ACNC: 1.9 UIU/ML (ref 0.36–3.74)
VLDLC SERPL CALC-MCNC: 23 MG/DL

## 2023-03-08 PROCEDURE — 87070 CULTURE OTHR SPECIMN AEROBIC: CPT

## 2023-03-08 PROCEDURE — 87804 INFLUENZA ASSAY W/OPTIC: CPT

## 2023-03-08 PROCEDURE — 87880 STREP A ASSAY W/OPTIC: CPT

## 2023-03-08 PROCEDURE — 99284 EMERGENCY DEPT VISIT MOD MDM: CPT

## 2023-03-08 PROCEDURE — 96372 THER/PROPH/DIAG INJ SC/IM: CPT

## 2023-03-08 PROCEDURE — 74011250636 HC RX REV CODE- 250/636

## 2023-03-08 PROCEDURE — U0005 INFEC AGEN DETEC AMPLI PROBE: HCPCS

## 2023-03-08 RX ORDER — KETOROLAC TROMETHAMINE 30 MG/ML
30 INJECTION, SOLUTION INTRAMUSCULAR; INTRAVENOUS
Status: COMPLETED | OUTPATIENT
Start: 2023-03-08 | End: 2023-03-08

## 2023-03-08 RX ADMIN — KETOROLAC TROMETHAMINE 30 MG: 30 INJECTION, SOLUTION INTRAMUSCULAR; INTRAVENOUS at 12:44

## 2023-03-08 NOTE — DISCHARGE INSTRUCTIONS
Follow-up with your primary care provider in 3 days. Take tylenol and ibuprofen as needed for pain and fever. Take ibuprofen with food.

## 2023-03-08 NOTE — ED TRIAGE NOTES
Pt arrives to the ER for complaints body aches, cough, chest congestion and a headache. Pt states that it started yesterday with a sore throat and then progressed to today's symptoms. States that she took an at home COVID test today that was negative. Denies chest pain, fevers, abdominal pain, diarrhea or nausea/vomiting. Denies any previous medical history.

## 2023-03-08 NOTE — ED PROVIDER NOTES
Marjorie Davila is a 34 y.o. female cold-like symptoms, cough, phlegm, sore throat, headache X 2-3 days but worsen yesterday night. Pt reports taking at home Covid test which was negative. Pt reports sob w/ increased activity. Pt reports COVID and flu vaccinated. Denies fever, chest pain, diarrhea, recent sick contacts, birth control use, lower leg swelling, ca/chemo, recent hospitalization, hemoptysis, dysuria. Pt denies pregnancy. Medical hx: HTN but improved after gastric sleeve, high cholesterol       The history is provided by the patient. No  was used. Past Medical History:   Diagnosis Date    Adverse effect of anesthesia     pt states it took \"at least 2 hours to wake up\" after last surgery    Hypercholesterolemia     Hypertension 1/28/2011    IUD (intrauterine device) in place 07/23/2015    removed 2015    LSIL (low grade squamous intraepithelial lesion) on Pap smear 12/4/12    Menorrhagia with regular cycle 7/2/2013    Obesity, Class II, BMI 35-39.9 9/23/13    BMI 37    Secondary dysmenorrhea 7/2/2013       Past Surgical History:   Procedure Laterality Date    HX COLPOSCOPY  2012    HX DILATION AND CURETTAGE  2008    pregnancy related    HX GASTRIC BYPASS      sleeve August 2020    HX OTHER SURGICAL  11/2013    laparoscopy/exploratory-uterine issues, checking endometriosis patient stated approx.  2012    HX WISDOM TEETH EXTRACTION      MD CONIZATION CERVIX W/WO D&C RPR ELTRD EXC N/A 1/26/2018    LOOP ELECTRODE EXCISION PROCEDURE OF CERVIX (LEEP) performed by Margaret Lindsey MD at [facility]         Family History:   Problem Relation Age of Onset    Hypertension Mother     Hypertension Father     Cancer Father         prostate    Hypertension Sister     Hypertension Maternal Aunt     Diabetes Maternal Grandmother     Hypertension Maternal Grandmother     Diabetes Maternal Grandfather     Hypertension Maternal Grandfather     Diabetes Paternal Grandmother     Hypertension Paternal Grandmother     Diabetes Paternal Grandfather     Hypertension Paternal Grandfather        Social History     Socioeconomic History    Marital status:      Spouse name: Not on file    Number of children: Not on file    Years of education: Not on file    Highest education level: Not on file   Occupational History    Not on file   Tobacco Use    Smoking status: Never    Smokeless tobacco: Never   Vaping Use    Vaping Use: Never used   Substance and Sexual Activity    Alcohol use: Yes     Comment: Only on special occasions    Drug use: No    Sexual activity: Yes     Partners: Male     Birth control/protection: None     Comment: Pill use began in July 2013. No other history of hormonal contraceptive use   Other Topics Concern    Not on file   Social History Narrative    Not on file     Social Determinants of Health     Financial Resource Strain: Not on file   Food Insecurity: Not on file   Transportation Needs: Not on file   Physical Activity: Insufficiently Active    Days of Exercise per Week: 2 days    Minutes of Exercise per Session: 30 min   Stress: Not on file   Social Connections: Not on file   Intimate Partner Violence: Not At Risk    Fear of Current or Ex-Partner: No    Emotionally Abused: No    Physically Abused: No    Sexually Abused: No   Housing Stability: Not on file         ALLERGIES: Lisinopril and Apple    Review of Systems   Constitutional:  Positive for chills. Negative for activity change, appetite change and fever. HENT:  Positive for congestion, rhinorrhea and sore throat. Eyes:  Negative for visual disturbance. Respiratory:  Positive for shortness of breath. Negative for cough. Cardiovascular:  Negative for chest pain. Gastrointestinal:  Negative for abdominal pain, diarrhea, nausea and vomiting. Genitourinary:  Negative for decreased urine volume, difficulty urinating and dysuria. Musculoskeletal:  Negative for myalgias. Skin:  Negative for rash.    Neurological: Positive for headaches. Negative for speech difficulty and weakness. All other systems reviewed and are negative. There were no vitals filed for this visit. Physical Exam  Vitals reviewed. Constitutional:       Appearance: Normal appearance. HENT:      Head: Normocephalic. Right Ear: Tympanic membrane normal.      Left Ear: Tympanic membrane normal.      Nose: Congestion and rhinorrhea present. Mouth/Throat:      Mouth: Mucous membranes are moist.      Pharynx: Posterior oropharyngeal erythema present. No oropharyngeal exudate. Eyes:      Extraocular Movements: Extraocular movements intact. Pupils: Pupils are equal, round, and reactive to light. Cardiovascular:      Rate and Rhythm: Normal rate and regular rhythm. Pulses: Normal pulses. Heart sounds: Normal heart sounds. Pulmonary:      Effort: Pulmonary effort is normal. No respiratory distress. Breath sounds: Normal breath sounds. No wheezing or rhonchi. Musculoskeletal:         General: Normal range of motion. Cervical back: Normal range of motion and neck supple. No rigidity or tenderness. Lymphadenopathy:      Cervical: No cervical adenopathy. Skin:     General: Skin is warm and dry. Neurological:      General: No focal deficit present. Mental Status: She is alert and oriented to person, place, and time. Mental status is at baseline. Psychiatric:         Mood and Affect: Mood normal.        Medical Decision Making  Amount and/or Complexity of Data Reviewed  Labs: ordered. Decision-making details documented in ED Course. Risk  Prescription drug management.       ED Course as of 03/08/23 1351   Wed Mar 08, 2023   1313 Influenza A Antigen(!): Positive [AL]   1313 Pulse (Heart Rate)(!): 122 [AL]      ED Course User Index  [AL] Kristine Pace MD   Patient is a 79-year-old female presenting to the emergency room complaining of cold-like symptoms, cough, phlegm, sore throat, headache for 2 to 3 days. Doubt pneumonia given no adventitious lung sounds were heard. Doubt peritonsillar abscess given no obvious unilateral swelling was noted and patient has no issues managing secretions. Doubt strep given throat swab was negative for strep. Influenza nasal swab does show patient was positive for influenza A. Discussed the risk and benefits of taking Tamiflu. Tamiflu is not recommended given patient's history of gastric bypass. Patient states understanding. Patient was given a dose of Toradol to help with symptoms while in the emergency room. Patient stable for discharge home. Patient encouraged to continue taking ibuprofen and Tylenol as needed for pain and fever. Patient educated about taking ibuprofen with food. Pt also encouraged to stay well hydrated. Patient also educated about reviewing COVID results on MyChart. Patient encouraged to follow-up with primary care doctor in 3 days. Strict return to ED precautions given. ACI discussed with the patient; see instruction below. Patient verbalized understanding. Procedures  N/A    LABORATORY TESTS:  Recent Results (from the past 12 hour(s))   INFLUENZA A+B VIRAL AGS    Collection Time: 03/08/23 12:29 PM   Result Value Ref Range    Influenza A Antigen Positive (A) NEG      Influenza B Antigen Negative NEG     STREP AG SCREEN, GROUP A    Collection Time: 03/08/23 12:29 PM    Specimen: Swab; Throat   Result Value Ref Range    Group A Strep Ag ID Negative NEG         IMAGING RESULTS:  No orders to display       MEDICATIONS GIVEN:  Medications   ketorolac (TORADOL) injection 30 mg (30 mg IntraMUSCular Given 3/8/23 1244)       IMPRESSION:  1. Influenza A        PLAN:  1. Ibuprofen and tylenol as needed for pain/fever. Discharge Medication List as of 3/8/2023  1:43 PM        2. Follow-up with PCP in 3 days.    Follow-up Information       Follow up With Specialties Details Why Contact Info    Paula Moyer MD Family Medicine Schedule an appointment as soon as possible for a visit in 3 days  [address] [address]  [phone]            3.  Return to ED if worse     Signed By: VINNIE Christian     March 8, 2023

## 2023-03-09 LAB
SARS-COV-2 RNA RESP QL NAA+PROBE: NOT DETECTED
SOURCE, COVRS: NORMAL

## 2023-03-09 RX ORDER — ERGOCALCIFEROL 1.25 MG/1
50000 CAPSULE ORAL
Qty: 8 CAPSULE | Refills: 0 | Status: SHIPPED | OUTPATIENT
Start: 2023-03-09

## 2023-03-09 NOTE — TELEPHONE ENCOUNTER
From: Cate Nagel  To: Blas Clarke MD  Sent: 3/8/2023 2:33 PM EST  Subject: Positive Flu    Good afternoon, Dr. Nicole Putnam,     I would like to make you aware that yesterday my symptoms started to progress and this afternoon I went to the ER. My test came back positive for the flu.

## 2023-03-10 LAB
BACTERIA SPEC CULT: NORMAL
SERVICE CMNT-IMP: NORMAL

## 2023-04-04 ENCOUNTER — PATIENT MESSAGE (OUTPATIENT)
Dept: OBGYN CLINIC | Age: 33
End: 2023-04-04

## 2023-04-04 RX ORDER — LETROZOLE 2.5 MG/1
TABLET, FILM COATED ORAL
Qty: 5 TABLET | Refills: 0 | Status: SHIPPED
Start: 2023-04-04

## 2023-04-04 NOTE — TELEPHONE ENCOUNTER
Spoke with patient and advised her per Dr Dawit Ramirez, her prescription will be changed to Letrozole and she will take 1 tablet daily for 5 days starting on day 3 of her menstrual cycle. Prescription submitted to her pharmacy.

## 2023-04-04 NOTE — TELEPHONE ENCOUNTER
----- Message from [address] [place] [address]. Jennifer Duncan sent at 4/4/2023 12:16 PM EDT -----  Regarding: Prescription Refill  Good afternoon,   I need a refill on my clomid.

## 2023-04-19 ENCOUNTER — HOSPITAL ENCOUNTER (EMERGENCY)
Age: 33
Discharge: HOME | End: 2023-04-20
Attending: EMERGENCY MEDICINE
Payer: COMMERCIAL

## 2023-04-19 DIAGNOSIS — N94.6 DYSMENORRHEA: Primary | ICD-10-CM

## 2023-04-19 LAB
APPEARANCE UR: CLEAR
BACTERIA URNS QL MICRO: NEGATIVE /HPF
BILIRUB UR QL: NEGATIVE
COLOR UR: ABNORMAL
EPITH CASTS URNS QL MICRO: ABNORMAL /LPF
GLUCOSE UR STRIP.AUTO-MCNC: NEGATIVE MG/DL
HCG UR QL: NEGATIVE
HGB UR QL STRIP: ABNORMAL
KETONES UR QL STRIP.AUTO: ABNORMAL MG/DL
LEUKOCYTE ESTERASE UR QL STRIP.AUTO: NEGATIVE
NITRITE UR QL STRIP.AUTO: NEGATIVE
PH UR STRIP: 6 (ref 5–8)
PROT UR STRIP-MCNC: NEGATIVE MG/DL
RBC #/AREA URNS HPF: ABNORMAL /HPF (ref 0–5)
SP GR UR REFRACTOMETRY: 1.02 (ref 1–1.03)
UA: UC IF INDICATED,UAUC: ABNORMAL
UROBILINOGEN UR QL STRIP.AUTO: 0.2 EU/DL (ref 0.2–1)
WBC URNS QL MICRO: ABNORMAL /HPF (ref 0–4)

## 2023-04-19 PROCEDURE — 99284 EMERGENCY DEPT VISIT MOD MDM: CPT

## 2023-04-19 PROCEDURE — 81001 URINALYSIS AUTO W/SCOPE: CPT

## 2023-04-19 PROCEDURE — 74011250636 HC RX REV CODE- 250/636: Performed by: EMERGENCY MEDICINE

## 2023-04-19 PROCEDURE — 96372 THER/PROPH/DIAG INJ SC/IM: CPT

## 2023-04-19 PROCEDURE — 81025 URINE PREGNANCY TEST: CPT

## 2023-04-19 RX ORDER — QUINIDINE GLUCONATE 324 MG
240 TABLET, EXTENDED RELEASE ORAL
COMMUNITY

## 2023-04-19 RX ORDER — KETOROLAC TROMETHAMINE 30 MG/ML
60 INJECTION, SOLUTION INTRAMUSCULAR; INTRAVENOUS
Status: COMPLETED | OUTPATIENT
Start: 2023-04-19 | End: 2023-04-19

## 2023-04-19 RX ADMIN — KETOROLAC TROMETHAMINE 60 MG: 30 INJECTION, SOLUTION INTRAMUSCULAR at 23:39

## 2023-04-20 VITALS
SYSTOLIC BLOOD PRESSURE: 157 MMHG | WEIGHT: 219 LBS | OXYGEN SATURATION: 100 % | TEMPERATURE: 98.3 F | DIASTOLIC BLOOD PRESSURE: 91 MMHG | HEIGHT: 67 IN | RESPIRATION RATE: 18 BRPM | BODY MASS INDEX: 34.37 KG/M2 | HEART RATE: 87 BPM

## 2023-04-20 RX ORDER — NAPROXEN 500 MG/1
500 TABLET ORAL 2 TIMES DAILY WITH MEALS
Qty: 20 TABLET | Refills: 0 | Status: SHIPPED | OUTPATIENT
Start: 2023-04-20

## 2023-04-20 NOTE — ED TRIAGE NOTES
[facility] 15. Patient ambulatory to treatment area. Patient states she normally has painful menstrual cycles but this one is causing unbearable pain. Patient states she is having extremely painful cramping in lower back and lower abdomen x 2 days. Patient just started Letrozole yesterday for infertility.

## 2023-04-20 NOTE — ED PROVIDER NOTES
27-year-old female with a past medical history significant for menometrorrhagia, dysmenorrhea, hypertension, hypercholesterolemia, currently on fertility treatments who presents to the ER with a complaint of vaginal bleeding with suprapubic discomfort that radiates to her back for the past 2 days. She described a lot of the discomfort, severity 8 out of 10, constant, without any aggravation or relieving factor and nonradiating. The patient states that she gets this pain every time she gets up. However is seem a bit worse today. She denies any fever, sore throat, headache, chest pain shortness of breath, nausea or vomiting, diarrhea constipation, dysuria, dizziness, extremity weakness or numbness, sick contact, skin rash or recent travel. Past Medical History:   Diagnosis Date    Adverse effect of anesthesia     pt states it took \"at least 2 hours to wake up\" after last surgery    Hypercholesterolemia     Hypertension 1/28/2011    IUD (intrauterine device) in place 07/23/2015    removed 2015    LSIL (low grade squamous intraepithelial lesion) on Pap smear 12/4/12    Menorrhagia with regular cycle 7/2/2013    Obesity, Class II, BMI 35-39.9 9/23/13    BMI 37    Secondary dysmenorrhea 7/2/2013       Past Surgical History:   Procedure Laterality Date    HX COLPOSCOPY  2012    HX DILATION AND CURETTAGE  2008    pregnancy related    HX GASTRIC BYPASS      sleeve August 2020    HX OTHER SURGICAL  11/2013    laparoscopy/exploratory-uterine issues, checking endometriosis patient stated approx.  2012    HX WISDOM TEETH EXTRACTION      MI CONIZATION CERVIX W/WO D&C RPR ELTRD EXC N/A 1/26/2018    LOOP ELECTRODE EXCISION PROCEDURE OF CERVIX (LEEP) performed by Maranda Beltran MD at [facility]         Family History:   Problem Relation Age of Onset    Hypertension Mother     Hypertension Father     Cancer Father         prostate    Hypertension Sister     Hypertension Maternal Aunt     Diabetes Maternal Grandmother Hypertension Maternal Grandmother     Diabetes Maternal Grandfather     Hypertension Maternal Grandfather     Diabetes Paternal Grandmother     Hypertension Paternal Grandmother     Diabetes Paternal Grandfather     Hypertension Paternal Grandfather        Social History     Socioeconomic History    Marital status:      Spouse name: Not on file    Number of children: Not on file    Years of education: Not on file    Highest education level: Not on file   Occupational History    Not on file   Tobacco Use    Smoking status: Never    Smokeless tobacco: Never   Vaping Use    Vaping Use: Never used   Substance and Sexual Activity    Alcohol use: Yes     Comment: Only on special occasions    Drug use: No    Sexual activity: Yes     Partners: Male     Birth control/protection: None     Comment: Pill use began in July 2013. No other history of hormonal contraceptive use   Other Topics Concern    Not on file   Social History Narrative    Not on file     Social Determinants of Health     Financial Resource Strain: Not on file   Food Insecurity: Not on file   Transportation Needs: Not on file   Physical Activity: Insufficiently Active    Days of Exercise per Week: 2 days    Minutes of Exercise per Session: 30 min   Stress: Not on file   Social Connections: Not on file   Intimate Partner Violence: Not At Risk    Fear of Current or Ex-Partner: No    Emotionally Abused: No    Physically Abused: No    Sexually Abused: No   Housing Stability: Not on file         ALLERGIES: Lisinopril and Apple    Review of Systems   All other systems reviewed and are negative. Vitals:    04/19/23 2322   BP: (!) 157/93   Pulse: 87   Resp: 18   Temp: 98.3 °F (36.8 °C)   SpO2: 100%   Weight: 99.3 kg (219 lb)   Height: 5' 7\" (1.702 m)            Physical Exam  Vitals and nursing note reviewed. Exam conducted with a chaperone present.       CONSTITUTIONAL: Well-appearing; well-nourished; in no apparent distress  HEAD: Normocephalic; atraumatic  EYES: PERRL; EOM intact; conjunctiva and sclera are clear bilaterally. ENT: No rhinorrhea; normal pharynx with no tonsillar hypertrophy; mucous membranes pink/moist, no erythema, no exudate. NECK: Supple; non-tender; no cervical lymphadenopathy  CARD: Normal S1, S2; no murmurs, rubs, or gallops. Regular rate and rhythm. RESP: Normal respiratory effort; breath sounds clear and equal bilaterally; no wheezes, rhonchi, or rales. ABD: Normal bowel sounds; non-distended; suprapubic tenderness without any rebound or guarding; no palpable organomegaly, no masses, no bruits. Back Exam: Normal inspection; no vertebral point tenderness, no CVA tenderness. Normal range of motion. EXT: Normal ROM in all four extremities; non-tender to palpation; no swelling or deformity; distal pulses are normal, no edema. SKIN: Warm; dry; no rash. NEURO:Alert and oriented x 3, coherent, CATHY-XII grossly intact, sensory and motor are non-focal.      Medical Decision Making  Assessment: This is a 42-year-old female with history of fibroid and suprapubic tenderness with dysmenorrhea who is otherwise stable with a fairly benign exam.  Rule out UTI. The patient has no other significant findings and states her symptoms are similar to previous history of dysmenorrhea as she does have a history of fibroids. Plan: Analgesia/education, reassurance, symptomatic treatment/urinalysis/serial exam/ Monitor and Reevaluate. Amount and/or Complexity of Data Reviewed  Labs: ordered. Risk  Prescription drug management. Procedures      Progress Note:   Pt has been reexamined by Trisat Mota MD. Pt is feeling much better. Symptoms have improved. All available results have been reviewed with pt and any available family. Pt understands sx, dx, and tx in ED. Care plan has been outlined and questions have been answered. Pt is ready to go home. Will send home on dysmenorrhea instruction. Prescription of naproxen. . Outpatient referral with PCP/gynecologist for evaluation and further treatment as needed. Written by Trista Mota MD,3:08 AM    .   .

## 2023-04-20 NOTE — ED NOTES
Pt discharged home, verbalized understanding of discharge instructions and prescription given, work note provided, pt ambulated out of dept with steady gait.

## 2023-05-10 ENCOUNTER — TELEPHONE (OUTPATIENT)
Age: 33
End: 2023-05-10

## 2023-05-10 DIAGNOSIS — N83.9 DISORDER OF OVULATION: Primary | ICD-10-CM

## 2023-05-10 RX ORDER — LETROZOLE 2.5 MG/1
TABLET, FILM COATED ORAL
Qty: 5 TABLET | Refills: 1 | Status: SHIPPED | OUTPATIENT
Start: 2023-05-10

## 2023-05-10 NOTE — TELEPHONE ENCOUNTER
----- Message from [address] [place] [address]. Spencer Arthur sent at 5/10/2023  9:49 AM EDT -----  Regarding: Refill  Contact: [phone]  Good morning, Leah,     I'm in need a refill on the letrozole 2.5 mg tablet. My cycle is due to start on Sun May 14th.

## 2023-06-05 ENCOUNTER — PATIENT MESSAGE (OUTPATIENT)
Age: 33
End: 2023-06-05

## 2023-06-05 DIAGNOSIS — E55.9 VITAMIN D DEFICIENCY: Primary | ICD-10-CM

## 2023-06-06 RX ORDER — ERGOCALCIFEROL 1.25 MG/1
50000 CAPSULE ORAL
Qty: 4 CAPSULE | Refills: 1 | Status: SHIPPED | OUTPATIENT
Start: 2023-06-06

## 2023-06-06 NOTE — TELEPHONE ENCOUNTER
From: Erika Leone  To: Dr. Alfreda Rogers: 6/5/2023 10:51 AM EDT  Subject: General Question    Good morning, Dr. Abner gant, and Nurse,  My first question, I'm out of my vitamin D prescription. Will I need to come in for a follow up, or can I receive a refill? Secondly, I have a friend who is a current patient at the office and her primary was Dr. Nicole Solorio. She is looking for a permanent female pcp at this location. I told her I would ask first to see if you were accepting new patients.  Patient name is Willard Moore date of birth ([birth year])

## 2023-06-29 DIAGNOSIS — N83.9 DISORDER OF OVULATION: Primary | ICD-10-CM

## 2023-07-24 DIAGNOSIS — E55.9 VITAMIN D DEFICIENCY: ICD-10-CM

## 2023-07-26 RX ORDER — ERGOCALCIFEROL 1.25 MG/1
CAPSULE ORAL
Qty: 4 CAPSULE | Refills: 1 | Status: SHIPPED | OUTPATIENT
Start: 2023-07-26

## 2023-07-26 NOTE — TELEPHONE ENCOUNTER
Medication Refill Request    Yandel Munguia is requesting a refill of the following medication(s):   Requested Prescriptions     Pending Prescriptions Disp Refills    vitamin D (ERGOCALCIFEROL) 1.25 MG (05331 UT) CAPS capsule [Pharmacy Med Name: Vitamin D (Ergocalciferol) 1.25 MG (88860 UT) Oral Capsule] 4 capsule 0     Sig: Take 1 capsule by mouth once a week      Last provider to prescribe medication: Dr. Rudi Peres  Last Date of Medication Prescribed:  06/06/2023  Last Office Visit Date: 03/07/2023; recommended to follow up in 3 months. No scheduled follow up   Last Labs:  Lab Results   Component Value Date/Time    VITD25 17.6 03/07/2023 10:15 AM       Please send refill to:     [address] [place], [place] [address] [address] [address]  Phone: [phone] Fax: [phone]    [address] [place] [address] [phone] [facility] [phone]  [address]  [address] [address]  Phone: [phone] Fax: [phone]

## 2023-08-01 DIAGNOSIS — N83.9 DISORDER OF OVULATION: ICD-10-CM

## 2023-08-29 SDOH — ECONOMIC STABILITY: TRANSPORTATION INSECURITY
IN THE PAST 12 MONTHS, HAS LACK OF TRANSPORTATION KEPT YOU FROM MEETINGS, WORK, OR FROM GETTING THINGS NEEDED FOR DAILY LIVING?: NO

## 2023-08-29 SDOH — ECONOMIC STABILITY: FOOD INSECURITY: WITHIN THE PAST 12 MONTHS, THE FOOD YOU BOUGHT JUST DIDN'T LAST AND YOU DIDN'T HAVE MONEY TO GET MORE.: SOMETIMES TRUE

## 2023-08-29 SDOH — ECONOMIC STABILITY: INCOME INSECURITY: HOW HARD IS IT FOR YOU TO PAY FOR THE VERY BASICS LIKE FOOD, HOUSING, MEDICAL CARE, AND HEATING?: NOT VERY HARD

## 2023-08-29 SDOH — ECONOMIC STABILITY: HOUSING INSECURITY
IN THE LAST 12 MONTHS, WAS THERE A TIME WHEN YOU DID NOT HAVE A STEADY PLACE TO SLEEP OR SLEPT IN A SHELTER (INCLUDING NOW)?: NO

## 2023-08-29 SDOH — ECONOMIC STABILITY: FOOD INSECURITY: WITHIN THE PAST 12 MONTHS, YOU WORRIED THAT YOUR FOOD WOULD RUN OUT BEFORE YOU GOT MONEY TO BUY MORE.: SOMETIMES TRUE

## 2023-09-01 ENCOUNTER — OFFICE VISIT (OUTPATIENT)
Age: 33
End: 2023-09-01
Payer: COMMERCIAL

## 2023-09-01 VITALS
WEIGHT: 228.8 LBS | SYSTOLIC BLOOD PRESSURE: 122 MMHG | HEART RATE: 75 BPM | BODY MASS INDEX: 35.91 KG/M2 | RESPIRATION RATE: 16 BRPM | HEIGHT: 67 IN | DIASTOLIC BLOOD PRESSURE: 78 MMHG | OXYGEN SATURATION: 98 %

## 2023-09-01 DIAGNOSIS — E55.9 VITAMIN D DEFICIENCY: Primary | ICD-10-CM

## 2023-09-01 DIAGNOSIS — Z01.84 IMMUNITY STATUS TESTING: ICD-10-CM

## 2023-09-01 PROCEDURE — 99212 OFFICE O/P EST SF 10 MIN: CPT | Performed by: FAMILY MEDICINE

## 2023-09-01 SDOH — ECONOMIC STABILITY: FOOD INSECURITY: WITHIN THE PAST 12 MONTHS, THE FOOD YOU BOUGHT JUST DIDN'T LAST AND YOU DIDN'T HAVE MONEY TO GET MORE.: NEVER TRUE

## 2023-09-01 SDOH — ECONOMIC STABILITY: FOOD INSECURITY: WITHIN THE PAST 12 MONTHS, YOU WORRIED THAT YOUR FOOD WOULD RUN OUT BEFORE YOU GOT MONEY TO BUY MORE.: NEVER TRUE

## 2023-09-01 SDOH — ECONOMIC STABILITY: INCOME INSECURITY: HOW HARD IS IT FOR YOU TO PAY FOR THE VERY BASICS LIKE FOOD, HOUSING, MEDICAL CARE, AND HEATING?: NOT HARD AT ALL

## 2023-09-01 ASSESSMENT — PATIENT HEALTH QUESTIONNAIRE - PHQ9
2. FEELING DOWN, DEPRESSED OR HOPELESS: 0
SUM OF ALL RESPONSES TO PHQ QUESTIONS 1-9: 0
SUM OF ALL RESPONSES TO PHQ QUESTIONS 1-9: 0
SUM OF ALL RESPONSES TO PHQ9 QUESTIONS 1 & 2: 0
1. LITTLE INTEREST OR PLEASURE IN DOING THINGS: 0
SUM OF ALL RESPONSES TO PHQ QUESTIONS 1-9: 0
SUM OF ALL RESPONSES TO PHQ QUESTIONS 1-9: 0

## 2023-09-01 NOTE — PROGRESS NOTES
History of Present Illness:     Chief Complaint   Patient presents with    3 Month Follow-Up       Georgette Franco is a 28 y.o. female     Follow up    Low vitamin D  Taking weekly high dose vitamin D    Weight stable since last visit  Up and down by a couple of pounds      PMH (REVIEWED):  Past Medical History:   Diagnosis Date    Adverse effect of anesthesia     pt states it took \"at least 2 hours to wake up\" after last surgery    Hypercholesterolemia     Hypertension 1/28/2011    IUD (intrauterine device) in place 07/23/2015    removed 2015    LSIL (low grade squamous intraepithelial lesion) on Pap smear 12/4/12    Menorrhagia with regular cycle 7/2/2013    Obesity, Class II, BMI 35-39.9 9/23/13    BMI 37    Secondary dysmenorrhea 7/2/2013       Current Medications/Allergies (REVIEWED):     Current Outpatient Medications on File Prior to Visit   Medication Sig Dispense Refill    Ferrous Sulfate (IRON PO) Take 1 tablet by mouth daily Iron Supplement Gummies Daily      vitamin D (ERGOCALCIFEROL) 1.25 MG (98645 UT) CAPS capsule Take 1 capsule by mouth once a week 4 capsule 1    clomiPHENE (CLOMID) 50 MG tablet Take 1 tablet by mouth starting on day 5 of the menstrual cycle. 5 tablet 2     No current facility-administered medications on file prior to visit. Allergies   Allergen Reactions    Lisinopril Angioedema and Swelling     Lips swell  Reaction Type: Allergy  Lips swell    Apple      Fresh Apples-Throat Itching          Review of Systems:     Review of Systems     Objective:     Vitals:    09/01/23 0800   BP: 122/78   Site: Left Upper Arm   Position: Sitting   Cuff Size: Large Adult   Pulse: 75   Resp: 16   SpO2: 98%   Weight: 228 lb 12.8 oz (103.8 kg)   Height: 5' 7\" (1.702 m)       Physical Exam:  General appearance - alert, well appearing, and in no distress    Recent Labs:  No results found for this or any previous visit (from the past 12 hour(s)).     Assessment and Plan:      Diagnosis Orders

## 2023-09-02 LAB — 25(OH)D3 SERPL-MCNC: 47.6 NG/ML (ref 30–100)

## 2023-09-03 LAB — VZV IGG SER IA-ACNC: 325 INDEX

## 2023-09-08 ENCOUNTER — OFFICE VISIT (OUTPATIENT)
Age: 33
End: 2023-09-08
Payer: COMMERCIAL

## 2023-09-08 VITALS
DIASTOLIC BLOOD PRESSURE: 93 MMHG | TEMPERATURE: 96.9 F | BODY MASS INDEX: 35.55 KG/M2 | HEART RATE: 82 BPM | OXYGEN SATURATION: 98 % | SYSTOLIC BLOOD PRESSURE: 141 MMHG | RESPIRATION RATE: 16 BRPM | WEIGHT: 226.5 LBS | HEIGHT: 67 IN

## 2023-09-08 DIAGNOSIS — N61.1 ABSCESS OF RIGHT BREAST: Primary | ICD-10-CM

## 2023-09-08 PROCEDURE — 99213 OFFICE O/P EST LOW 20 MIN: CPT | Performed by: OBSTETRICS & GYNECOLOGY

## 2023-09-08 RX ORDER — FLUCONAZOLE 150 MG/1
150 TABLET ORAL ONCE
Qty: 1 TABLET | Refills: 0 | Status: SHIPPED | OUTPATIENT
Start: 2023-09-08 | End: 2023-09-08

## 2023-09-08 RX ORDER — DICLOXACILLIN SODIUM 250 MG/1
250 CAPSULE ORAL 3 TIMES DAILY
Qty: 30 CAPSULE | Refills: 0 | Status: SHIPPED | OUTPATIENT
Start: 2023-09-08 | End: 2023-09-18

## 2023-09-08 RX ORDER — CLINDAMYCIN PHOSPHATE 10 MG/G
GEL TOPICAL
Qty: 30 G | Refills: 0 | Status: SHIPPED | OUTPATIENT
Start: 2023-09-08 | End: 2023-09-15

## 2023-09-19 ENCOUNTER — HOSPITAL ENCOUNTER (OUTPATIENT)
Facility: HOSPITAL | Age: 33
Discharge: HOME | End: 2023-09-22
Attending: OBSTETRICS & GYNECOLOGY
Payer: COMMERCIAL

## 2023-09-19 DIAGNOSIS — N61.1 ABSCESS OF RIGHT BREAST: ICD-10-CM

## 2023-09-19 PROCEDURE — 76642 ULTRASOUND BREAST LIMITED: CPT

## 2023-09-20 ENCOUNTER — HOSPITAL ENCOUNTER (EMERGENCY)
Facility: HOSPITAL | Age: 33
Discharge: HOME | End: 2023-09-20
Attending: EMERGENCY MEDICINE
Payer: COMMERCIAL

## 2023-09-20 ENCOUNTER — APPOINTMENT (OUTPATIENT)
Facility: HOSPITAL | Age: 33
End: 2023-09-20
Payer: COMMERCIAL

## 2023-09-20 VITALS
TEMPERATURE: 99 F | BODY MASS INDEX: 35 KG/M2 | RESPIRATION RATE: 18 BRPM | SYSTOLIC BLOOD PRESSURE: 148 MMHG | WEIGHT: 223 LBS | HEART RATE: 100 BPM | HEIGHT: 67 IN | OXYGEN SATURATION: 100 % | DIASTOLIC BLOOD PRESSURE: 91 MMHG

## 2023-09-20 DIAGNOSIS — M25.511 ACUTE PAIN OF RIGHT SHOULDER: Primary | ICD-10-CM

## 2023-09-20 PROCEDURE — 73030 X-RAY EXAM OF SHOULDER: CPT

## 2023-09-20 PROCEDURE — 96372 THER/PROPH/DIAG INJ SC/IM: CPT

## 2023-09-20 PROCEDURE — 6360000002 HC RX W HCPCS: Performed by: NURSE PRACTITIONER

## 2023-09-20 PROCEDURE — 6370000000 HC RX 637 (ALT 250 FOR IP): Performed by: NURSE PRACTITIONER

## 2023-09-20 PROCEDURE — 99284 EMERGENCY DEPT VISIT MOD MDM: CPT

## 2023-09-20 RX ORDER — CYCLOBENZAPRINE HCL 5 MG
5 TABLET ORAL 2 TIMES DAILY PRN
Qty: 20 TABLET | Refills: 0 | Status: SHIPPED | OUTPATIENT
Start: 2023-09-20 | End: 2023-09-30

## 2023-09-20 RX ORDER — DICLOFENAC POTASSIUM 50 MG/1
50 TABLET, FILM COATED ORAL 2 TIMES DAILY
Qty: 20 TABLET | Refills: 0 | Status: SHIPPED | OUTPATIENT
Start: 2023-09-20 | End: 2023-09-30

## 2023-09-20 RX ORDER — KETOROLAC TROMETHAMINE 30 MG/ML
30 INJECTION, SOLUTION INTRAMUSCULAR; INTRAVENOUS
Status: DISCONTINUED | OUTPATIENT
Start: 2023-09-20 | End: 2023-09-20

## 2023-09-20 RX ORDER — KETOROLAC TROMETHAMINE 30 MG/ML
30 INJECTION, SOLUTION INTRAMUSCULAR; INTRAVENOUS ONCE
Status: COMPLETED | OUTPATIENT
Start: 2023-09-20 | End: 2023-09-20

## 2023-09-20 RX ORDER — DIAZEPAM 5 MG/1
5 TABLET ORAL EVERY 6 HOURS PRN
Status: DISCONTINUED | OUTPATIENT
Start: 2023-09-20 | End: 2023-09-21 | Stop reason: HOSPADM

## 2023-09-20 RX ADMIN — KETOROLAC TROMETHAMINE 30 MG: 30 INJECTION, SOLUTION INTRAMUSCULAR at 22:28

## 2023-09-20 RX ADMIN — DIAZEPAM 5 MG: 5 TABLET ORAL at 22:27

## 2023-09-20 ASSESSMENT — PAIN DESCRIPTION - ORIENTATION
ORIENTATION: RIGHT
ORIENTATION: RIGHT

## 2023-09-20 ASSESSMENT — LIFESTYLE VARIABLES
HOW OFTEN DO YOU HAVE A DRINK CONTAINING ALCOHOL: MONTHLY OR LESS
HOW MANY STANDARD DRINKS CONTAINING ALCOHOL DO YOU HAVE ON A TYPICAL DAY: 1 OR 2

## 2023-09-20 ASSESSMENT — PAIN - FUNCTIONAL ASSESSMENT: PAIN_FUNCTIONAL_ASSESSMENT: 0-10

## 2023-09-20 ASSESSMENT — PAIN DESCRIPTION - LOCATION
LOCATION: SHOULDER;ELBOW
LOCATION: ARM;SHOULDER;ELBOW

## 2023-09-20 ASSESSMENT — PAIN SCALES - GENERAL
PAINLEVEL_OUTOF10: 10
PAINLEVEL_OUTOF10: 10

## 2023-09-20 ASSESSMENT — PAIN DESCRIPTION - DESCRIPTORS: DESCRIPTORS: THROBBING

## 2023-09-21 NOTE — DISCHARGE INSTRUCTIONS
Thank you for coming to the Emergency Department. Your x-ray was read as normal.  Have sent in diclofenac 50 mg twice a day along with the muscle relaxer please follow-up with orthopedic and primary care. I have also included a work note for tomorrow.

## 2023-09-21 NOTE — ED NOTES
Discharge instructions reviewed and pt teaching completed. (2) prescription(s) discussed. Pt informed to follow up with PCP regarding today's visit. Pt instructed to report to ED if symptoms return or worsen.         Joesph Campbell RN  09/21/23 0000

## 2024-01-19 ENCOUNTER — HOSPITAL ENCOUNTER (EMERGENCY)
Facility: HOSPITAL | Age: 34
Discharge: HOME | End: 2024-01-19
Attending: EMERGENCY MEDICINE
Payer: COMMERCIAL

## 2024-01-19 VITALS
TEMPERATURE: 98.4 F | BODY MASS INDEX: 36.57 KG/M2 | SYSTOLIC BLOOD PRESSURE: 144 MMHG | DIASTOLIC BLOOD PRESSURE: 83 MMHG | WEIGHT: 233 LBS | OXYGEN SATURATION: 99 % | HEIGHT: 67 IN | HEART RATE: 79 BPM | RESPIRATION RATE: 16 BRPM

## 2024-01-19 DIAGNOSIS — R51.9 ACUTE NONINTRACTABLE HEADACHE, UNSPECIFIED HEADACHE TYPE: Primary | ICD-10-CM

## 2024-01-19 LAB — HCG UR QL: NEGATIVE

## 2024-01-19 PROCEDURE — 99283 EMERGENCY DEPT VISIT LOW MDM: CPT

## 2024-01-19 PROCEDURE — 6370000000 HC RX 637 (ALT 250 FOR IP): Performed by: EMERGENCY MEDICINE

## 2024-01-19 PROCEDURE — 81025 URINE PREGNANCY TEST: CPT

## 2024-01-19 RX ORDER — ACETAMINOPHEN 500 MG
1000 TABLET ORAL
Status: COMPLETED | OUTPATIENT
Start: 2024-01-19 | End: 2024-01-19

## 2024-01-19 RX ORDER — IBUPROFEN 600 MG/1
600 TABLET ORAL
Status: COMPLETED | OUTPATIENT
Start: 2024-01-19 | End: 2024-01-19

## 2024-01-19 RX ADMIN — IBUPROFEN 600 MG: 600 TABLET, FILM COATED ORAL at 07:18

## 2024-01-19 RX ADMIN — ACETAMINOPHEN 1000 MG: 500 TABLET ORAL at 07:18

## 2024-01-19 ASSESSMENT — PAIN SCALES - GENERAL: PAINLEVEL_OUTOF10: 7

## 2024-01-19 ASSESSMENT — LIFESTYLE VARIABLES
HOW OFTEN DO YOU HAVE A DRINK CONTAINING ALCOHOL: NEVER
HOW MANY STANDARD DRINKS CONTAINING ALCOHOL DO YOU HAVE ON A TYPICAL DAY: PATIENT DOES NOT DRINK

## 2024-01-19 ASSESSMENT — PAIN DESCRIPTION - LOCATION: LOCATION: HEAD

## 2024-01-19 ASSESSMENT — PAIN - FUNCTIONAL ASSESSMENT: PAIN_FUNCTIONAL_ASSESSMENT: 0-10

## 2024-01-19 NOTE — DISCHARGE INSTRUCTIONS
Thank you!  Thank you for allowing me to care for you in the emergency department. It is my goal to provide you with excellent care.  Please fill out the survey that will come to you by mail or email since we listen to your feedback!     Below you will find a list of your tests from today's visit.  Should you have any questions, please do not hesitate to call the emergency department.    Labs  Recent Results (from the past 12 hour(s))   POC Pregnancy Urine Qual    Collection Time: 01/19/24  7:14 AM   Result Value Ref Range    Preg Test, Ur Negative Negative         Radiologic Studies  No orders to display     ------------------------------------------------------------------------------------------------------------  The exam and treatment you received in the Emergency Department were for an urgent problem and are not intended as complete care. It is important that you follow-up with a doctor, nurse practitioner, or physician assistant to:  (1) confirm your diagnosis,  (2) re-evaluation of changes in your illness and treatment, and (3) for ongoing care. Please take your discharge instructions with you when you go to your follow-up appointment.     If you have any problem arranging a follow-up appointment, contact the Emergency Department.  If your symptoms become worse or you do not improve as expected and you are unable to reach your health care provider, please return to the Emergency Department. We are available 24 hours a day.     If a prescription has been provided, please have it filled as soon as possible to prevent a delay in treatment. If you have any questions or reservations about taking the medication due to side effects or interactions with other medications, please call your primary care provider or contact the ER.

## 2024-01-19 NOTE — ED PROVIDER NOTES
instructions have been discussed, understanding conveyed, and agreed upon. The patient is to follow up as recommended or return to ER should their symptoms worsen.      PATIENT REFERRED TO:  [facility] EMERGENCY DEPARTMENT  [address]  [facility] [address] [phone]    As needed, If symptoms worsen    Violet Diop MD  [address] [facility] [address] [phone]    Schedule an appointment as soon as possible for a visit           DISCHARGE MEDICATIONS:     Medication List        ASK your doctor about these medications      clomiPHENE 50 MG tablet  Commonly known as: CLOMID  Take 1 tablet by mouth starting on day 5 of the menstrual cycle.     diclofenac 50 MG tablet  Commonly known as: CATAFLAM  Take 1 tablet by mouth 2 times daily for 10 days     IRON PO     vitamin D 1.25 MG (21072 UT) Caps capsule  Commonly known as: ERGOCALCIFEROL  Take 1 capsule by mouth once a week                DISCONTINUED MEDICATIONS:  Current Discharge Medication List          I am the Primary Clinician of Record: Ehsan Diaz DO (electronically signed)    (Please note that parts of this dictation were completed with voice recognition software. Quite often unanticipated grammatical, syntax, homophones, and other interpretive errors are inadvertently transcribed by the computer software. Please disregards these errors. Please excuse any errors that have escaped final proofreading.)     Ehsan Diaz DO  01/19/24 0758

## 2024-02-08 ENCOUNTER — OFFICE VISIT (OUTPATIENT)
Age: 34
End: 2024-02-08
Payer: COMMERCIAL

## 2024-02-08 VITALS
DIASTOLIC BLOOD PRESSURE: 91 MMHG | RESPIRATION RATE: 16 BRPM | WEIGHT: 242 LBS | BODY MASS INDEX: 37.98 KG/M2 | HEIGHT: 67 IN | OXYGEN SATURATION: 100 % | SYSTOLIC BLOOD PRESSURE: 158 MMHG | HEART RATE: 86 BPM

## 2024-02-08 DIAGNOSIS — N83.9 DISORDER OF OVULATION: ICD-10-CM

## 2024-02-08 DIAGNOSIS — Z01.419 GYNECOLOGIC EXAM NORMAL: Primary | ICD-10-CM

## 2024-02-08 DIAGNOSIS — Z12.4 ENCOUNTER FOR SCREENING FOR MALIGNANT NEOPLASM OF CERVIX: ICD-10-CM

## 2024-02-08 PROCEDURE — 99395 PREV VISIT EST AGE 18-39: CPT | Performed by: OBSTETRICS & GYNECOLOGY

## 2024-02-10 LAB
., LABCORP: NORMAL
CYTOLOGIST CVX/VAG CYTO: NORMAL
CYTOLOGY CVX/VAG DOC CYTO: NORMAL
CYTOLOGY CVX/VAG DOC THIN PREP: NORMAL
DX ICD CODE: NORMAL
Lab: NORMAL
OTHER STN SPEC: NORMAL
STAT OF ADQ CVX/VAG CYTO-IMP: NORMAL

## 2024-06-12 ENCOUNTER — APPOINTMENT (OUTPATIENT)
Facility: HOSPITAL | Age: 34
End: 2024-06-12
Payer: COMMERCIAL

## 2024-06-12 ENCOUNTER — HOSPITAL ENCOUNTER (EMERGENCY)
Facility: HOSPITAL | Age: 34
Discharge: HOME | End: 2024-06-12
Attending: EMERGENCY MEDICINE
Payer: COMMERCIAL

## 2024-06-12 VITALS
TEMPERATURE: 98.7 F | BODY MASS INDEX: 36.1 KG/M2 | SYSTOLIC BLOOD PRESSURE: 155 MMHG | HEART RATE: 74 BPM | WEIGHT: 230 LBS | DIASTOLIC BLOOD PRESSURE: 95 MMHG | HEIGHT: 67 IN | RESPIRATION RATE: 19 BRPM | OXYGEN SATURATION: 98 %

## 2024-06-12 DIAGNOSIS — R10.31 ABDOMINAL PAIN, RIGHT LOWER QUADRANT: Primary | ICD-10-CM

## 2024-06-12 DIAGNOSIS — D21.9 FIBROIDS: ICD-10-CM

## 2024-06-12 LAB
ALBUMIN SERPL-MCNC: 3.6 G/DL (ref 3.5–5)
ALBUMIN/GLOB SERPL: 0.8 (ref 1.1–2.2)
ALP SERPL-CCNC: 63 U/L (ref 45–117)
ALT SERPL-CCNC: 25 U/L (ref 12–78)
ANION GAP SERPL CALC-SCNC: 8 MMOL/L (ref 5–15)
APPEARANCE UR: CLEAR
AST SERPL W P-5'-P-CCNC: 11 U/L (ref 15–37)
BACTERIA URNS QL MICRO: NEGATIVE /HPF
BASOPHILS # BLD: 0 K/UL (ref 0–0.1)
BASOPHILS NFR BLD: 0 % (ref 0–1)
BILIRUB SERPL-MCNC: 0.2 MG/DL (ref 0.2–1)
BILIRUB UR QL: NEGATIVE
BUN SERPL-MCNC: 13 MG/DL (ref 6–20)
BUN/CREAT SERPL: 13 (ref 12–20)
CA-I BLD-MCNC: 9.3 MG/DL (ref 8.5–10.1)
CHLORIDE SERPL-SCNC: 104 MMOL/L (ref 97–108)
CO2 SERPL-SCNC: 30 MMOL/L (ref 21–32)
COLOR UR: YELLOW
CREAT SERPL-MCNC: 0.97 MG/DL (ref 0.55–1.02)
DIFFERENTIAL METHOD BLD: ABNORMAL
EOSINOPHIL # BLD: 0.1 K/UL (ref 0–0.4)
EOSINOPHIL NFR BLD: 1 % (ref 0–7)
EPITH CASTS URNS QL MICRO: ABNORMAL /LPF
ERYTHROCYTE [DISTWIDTH] IN BLOOD BY AUTOMATED COUNT: 16.8 % (ref 11.5–14.5)
GLOBULIN SER CALC-MCNC: 4.8 G/DL (ref 2–4)
GLUCOSE SERPL-MCNC: 80 MG/DL (ref 65–100)
GLUCOSE UR STRIP.AUTO-MCNC: NEGATIVE MG/DL
HCG UR QL: NEGATIVE
HCT VFR BLD AUTO: 33.7 % (ref 35–47)
HGB BLD-MCNC: 10.8 G/DL (ref 11.5–16)
HGB UR QL STRIP: NEGATIVE
IMM GRANULOCYTES # BLD AUTO: 0 K/UL (ref 0–0.04)
IMM GRANULOCYTES NFR BLD AUTO: 1 % (ref 0–0.5)
KETONES UR QL STRIP.AUTO: 15 MG/DL
LEUKOCYTE ESTERASE UR QL STRIP.AUTO: NEGATIVE
LYMPHOCYTES # BLD: 2.1 K/UL (ref 0.8–3.5)
LYMPHOCYTES NFR BLD: 32 % (ref 12–49)
MCH RBC QN AUTO: 26.2 PG (ref 26–34)
MCHC RBC AUTO-ENTMCNC: 32 G/DL (ref 30–36.5)
MCV RBC AUTO: 81.8 FL (ref 80–99)
MONOCYTES # BLD: 0.7 K/UL (ref 0–1)
MONOCYTES NFR BLD: 10 % (ref 5–13)
NEUTS SEG # BLD: 3.7 K/UL (ref 1.8–8)
NEUTS SEG NFR BLD: 56 % (ref 32–75)
NITRITE UR QL STRIP.AUTO: NEGATIVE
PH UR STRIP: 7 (ref 5–8)
PLATELET # BLD AUTO: 229 K/UL (ref 150–400)
PMV BLD AUTO: 9.8 FL (ref 8.9–12.9)
POTASSIUM SERPL-SCNC: 4.4 MMOL/L (ref 3.5–5.1)
PROT SERPL-MCNC: 8.4 G/DL (ref 6.4–8.2)
PROT UR STRIP-MCNC: NEGATIVE MG/DL
RBC # BLD AUTO: 4.12 M/UL (ref 3.8–5.2)
RBC #/AREA URNS HPF: ABNORMAL /HPF (ref 0–5)
SODIUM SERPL-SCNC: 142 MMOL/L (ref 136–145)
SP GR UR REFRACTOMETRY: 1.01 (ref 1–1.03)
URINE CULTURE IF INDICATED: ABNORMAL
UROBILINOGEN UR QL STRIP.AUTO: 0.1 EU/DL (ref 0.2–1)
WBC # BLD AUTO: 6.6 K/UL (ref 3.6–11)
WBC URNS QL MICRO: ABNORMAL /HPF (ref 0–4)

## 2024-06-12 PROCEDURE — 81025 URINE PREGNANCY TEST: CPT

## 2024-06-12 PROCEDURE — 74177 CT ABD & PELVIS W/CONTRAST: CPT

## 2024-06-12 PROCEDURE — 85025 COMPLETE CBC W/AUTO DIFF WBC: CPT

## 2024-06-12 PROCEDURE — 81001 URINALYSIS AUTO W/SCOPE: CPT

## 2024-06-12 PROCEDURE — 36415 COLL VENOUS BLD VENIPUNCTURE: CPT

## 2024-06-12 PROCEDURE — 6360000004 HC RX CONTRAST MEDICATION: Performed by: EMERGENCY MEDICINE

## 2024-06-12 PROCEDURE — 80053 COMPREHEN METABOLIC PANEL: CPT

## 2024-06-12 PROCEDURE — 99285 EMERGENCY DEPT VISIT HI MDM: CPT

## 2024-06-12 RX ADMIN — IOPAMIDOL 100 ML: 755 INJECTION, SOLUTION INTRAVENOUS at 13:56

## 2024-06-12 ASSESSMENT — PAIN - FUNCTIONAL ASSESSMENT: PAIN_FUNCTIONAL_ASSESSMENT: 0-10

## 2024-06-12 ASSESSMENT — PAIN DESCRIPTION - ORIENTATION: ORIENTATION: RIGHT;LOWER

## 2024-06-12 ASSESSMENT — PAIN DESCRIPTION - LOCATION: LOCATION: ABDOMEN

## 2024-06-12 NOTE — ED TRIAGE NOTES
Reports that since Saturday she has had RLQ abdominal pain that is intermittent, worse with movement or laying on that side.  Denies NVD, vaginal discharge or bleeding.

## 2024-06-12 NOTE — ED PROVIDER NOTES
[facility] EMERGENCY DEPARTMENT  EMERGENCY DEPARTMENT HISTORY AND PHYSICAL EXAM      Date: 6/12/2024  Patient Name: Staci Seals  MRN: [id number]  Birthdate 1990  Date of evaluation: 6/12/2024  Provider: Ricky Mccarthy DO   Note Started: 12:37 PM EDT 6/12/24    HISTORY OF PRESENT ILLNESS     Chief Complaint   Patient presents with    Abdominal Pain     History Provided By: Patient    HPI: Staci Seals is a 34 y.o. female with past medical history of hypertension, fibroids  who presents with right lower quadrant abdominal pain.  Symptoms present for about 4 days and starting to improve today.  Denies abnormal vaginal bleeding.  She is not having any nausea, vomiting, or diarrhea.  No fevers.    PAST MEDICAL HISTORY   Past Medical History:  Past Medical History:   Diagnosis Date    Adverse effect of anesthesia     pt states it took \"at least 2 hours to wake up\" after last surgery    Hypercholesterolemia     Hypertension 1/28/2011    IUD (intrauterine device) in place 07/23/2015    removed 2015    LSIL (low grade squamous intraepithelial lesion) on Pap smear 12/4/12    Menorrhagia with regular cycle 7/2/2013    Obesity, Class II, BMI 35-39.9 9/23/13    BMI 37    Secondary dysmenorrhea 7/2/2013       Past Surgical History:  Past Surgical History:   Procedure Laterality Date    COLPOSCOPY  2012    CONIZATION CERVIX,LOOP ELECTRD N/A 1/26/2018    LOOP ELECTRODE EXCISION PROCEDURE OF CERVIX (LEEP) performed by Bubba Euceda MD at [facility] MAIN OR    DILATION AND CURETTAGE OF UTERUS  2008    pregnancy related    GASTRIC BYPASS SURGERY      sleeve August 2020    OTHER SURGICAL HISTORY  11/2013    laparoscopy/exploratory-uterine issues, checking endometriosis patient stated approx. 2012    WISDOM TOOTH EXTRACTION         Family History:  Family History   Problem Relation Age of Onset    Diabetes Paternal Grandfather     Hypertension Paternal Grandfather     High Blood Pressure Paternal Grandfather     Diabetes

## 2024-06-14 ENCOUNTER — PATIENT MESSAGE (OUTPATIENT)
Age: 34
End: 2024-06-14

## 2024-06-14 NOTE — TELEPHONE ENCOUNTER
From: Staci Seals  To: Dr. Rufina Klein  Sent: 6/14/2024 2:56 PM EDT  Subject: Follow up    Good afternoon,  I was seen in the ER for right lower abdominal pain on Wednesday, June 12th. I was seen for right lower abdomen pain. Everything came back fine only issue is with my fibroids, which can be possibly causing the pain. Would you like for me to come schedule a follow up visit?

## 2024-06-21 ENCOUNTER — TELEPHONE (OUTPATIENT)
Age: 34
End: 2024-06-21

## 2024-06-21 RX ORDER — CYCLOBENZAPRINE HCL 5 MG
5 TABLET ORAL 2 TIMES DAILY PRN
Qty: 20 TABLET | Refills: 1 | Status: SHIPPED | OUTPATIENT
Start: 2024-06-21 | End: 2024-07-11

## 2024-06-21 NOTE — TELEPHONE ENCOUNTER
Patient called stating she is having increased pain from her fibroids. She was advised to stop taking Ibuprofen and tylenol is not working. Patient is requesting flexeril. Per Dr. Renee patient can have Flexeril until her follow-up appointment.

## 2024-07-20 ENCOUNTER — APPOINTMENT (OUTPATIENT)
Facility: HOSPITAL | Age: 34
End: 2024-07-20
Payer: COMMERCIAL

## 2024-07-20 ENCOUNTER — HOSPITAL ENCOUNTER (EMERGENCY)
Facility: HOSPITAL | Age: 34
Discharge: HOME | End: 2024-07-20
Attending: EMERGENCY MEDICINE
Payer: COMMERCIAL

## 2024-07-20 VITALS
RESPIRATION RATE: 15 BRPM | TEMPERATURE: 98.3 F | OXYGEN SATURATION: 100 % | HEIGHT: 67 IN | HEART RATE: 86 BPM | WEIGHT: 239.2 LBS | DIASTOLIC BLOOD PRESSURE: 113 MMHG | BODY MASS INDEX: 37.54 KG/M2 | SYSTOLIC BLOOD PRESSURE: 154 MMHG

## 2024-07-20 DIAGNOSIS — N94.6 DYSMENORRHEA: Primary | ICD-10-CM

## 2024-07-20 DIAGNOSIS — D25.9 UTERINE LEIOMYOMA, UNSPECIFIED LOCATION: ICD-10-CM

## 2024-07-20 LAB
ALBUMIN SERPL-MCNC: 3.4 G/DL (ref 3.5–5)
ALBUMIN/GLOB SERPL: 0.8 (ref 1.1–2.2)
ALP SERPL-CCNC: 70 U/L (ref 45–117)
ALT SERPL-CCNC: 15 U/L (ref 12–78)
ANION GAP SERPL CALC-SCNC: 5 MMOL/L (ref 5–15)
APPEARANCE UR: ABNORMAL
AST SERPL-CCNC: 12 U/L (ref 15–37)
BACTERIA URNS QL MICRO: NEGATIVE /HPF
BASOPHILS # BLD: 0 K/UL (ref 0–0.1)
BASOPHILS NFR BLD: 0 % (ref 0–1)
BILIRUB SERPL-MCNC: 0.2 MG/DL (ref 0.2–1)
BILIRUB UR QL: NEGATIVE
BUN SERPL-MCNC: 10 MG/DL (ref 6–20)
BUN/CREAT SERPL: 11 (ref 12–20)
CALCIUM SERPL-MCNC: 8.8 MG/DL (ref 8.5–10.1)
CHLORIDE SERPL-SCNC: 107 MMOL/L (ref 97–108)
CO2 SERPL-SCNC: 27 MMOL/L (ref 21–32)
COLOR UR: ABNORMAL
CREAT SERPL-MCNC: 0.91 MG/DL (ref 0.55–1.02)
DIFFERENTIAL METHOD BLD: ABNORMAL
EOSINOPHIL # BLD: 0.1 K/UL (ref 0–0.4)
EOSINOPHIL NFR BLD: 2 % (ref 0–7)
EPITH CASTS URNS QL MICRO: ABNORMAL /LPF
ERYTHROCYTE [DISTWIDTH] IN BLOOD BY AUTOMATED COUNT: 18 % (ref 11.5–14.5)
GLOBULIN SER CALC-MCNC: 4.4 G/DL (ref 2–4)
GLUCOSE SERPL-MCNC: 120 MG/DL (ref 65–100)
GLUCOSE UR STRIP.AUTO-MCNC: NEGATIVE MG/DL
HCG UR QL: NEGATIVE
HCT VFR BLD AUTO: 35.1 % (ref 35–47)
HGB BLD-MCNC: 11.4 G/DL (ref 11.5–16)
HGB UR QL STRIP: ABNORMAL
HYALINE CASTS URNS QL MICRO: ABNORMAL /LPF (ref 0–2)
IMM GRANULOCYTES # BLD AUTO: 0 K/UL (ref 0–0.04)
IMM GRANULOCYTES NFR BLD AUTO: 0 % (ref 0–0.5)
KETONES UR QL STRIP.AUTO: NEGATIVE MG/DL
LEUKOCYTE ESTERASE UR QL STRIP.AUTO: NEGATIVE
LIPASE SERPL-CCNC: 44 U/L (ref 13–75)
LYMPHOCYTES # BLD: 1.5 K/UL (ref 0.8–3.5)
LYMPHOCYTES NFR BLD: 32 % (ref 12–49)
MCH RBC QN AUTO: 26.6 PG (ref 26–34)
MCHC RBC AUTO-ENTMCNC: 32.5 G/DL (ref 30–36.5)
MCV RBC AUTO: 81.8 FL (ref 80–99)
MONOCYTES # BLD: 0.4 K/UL (ref 0–1)
MONOCYTES NFR BLD: 10 % (ref 5–13)
NEUTS SEG # BLD: 2.5 K/UL (ref 1.8–8)
NEUTS SEG NFR BLD: 56 % (ref 32–75)
NITRITE UR QL STRIP.AUTO: NEGATIVE
NRBC # BLD: 0 K/UL (ref 0–0.01)
NRBC BLD-RTO: 0 PER 100 WBC
PH UR STRIP: 7.5 (ref 5–8)
PLATELET # BLD AUTO: 224 K/UL (ref 150–400)
PMV BLD AUTO: 10.2 FL (ref 8.9–12.9)
POTASSIUM SERPL-SCNC: 3.7 MMOL/L (ref 3.5–5.1)
PROT SERPL-MCNC: 7.8 G/DL (ref 6.4–8.2)
PROT UR STRIP-MCNC: ABNORMAL MG/DL
RBC # BLD AUTO: 4.29 M/UL (ref 3.8–5.2)
RBC #/AREA URNS HPF: >100 /HPF (ref 0–5)
SODIUM SERPL-SCNC: 139 MMOL/L (ref 136–145)
SP GR UR REFRACTOMETRY: 1.01 (ref 1–1.03)
URINE CULTURE IF INDICATED: ABNORMAL
UROBILINOGEN UR QL STRIP.AUTO: 0.2 EU/DL (ref 0.2–1)
WBC # BLD AUTO: 4.5 K/UL (ref 3.6–11)
WBC URNS QL MICRO: ABNORMAL /HPF (ref 0–4)

## 2024-07-20 PROCEDURE — 96374 THER/PROPH/DIAG INJ IV PUSH: CPT

## 2024-07-20 PROCEDURE — 85025 COMPLETE CBC W/AUTO DIFF WBC: CPT

## 2024-07-20 PROCEDURE — 6360000002 HC RX W HCPCS: Performed by: EMERGENCY MEDICINE

## 2024-07-20 PROCEDURE — 76830 TRANSVAGINAL US NON-OB: CPT

## 2024-07-20 PROCEDURE — 99284 EMERGENCY DEPT VISIT MOD MDM: CPT

## 2024-07-20 PROCEDURE — 36415 COLL VENOUS BLD VENIPUNCTURE: CPT

## 2024-07-20 PROCEDURE — 81001 URINALYSIS AUTO W/SCOPE: CPT

## 2024-07-20 PROCEDURE — 96375 TX/PRO/DX INJ NEW DRUG ADDON: CPT

## 2024-07-20 PROCEDURE — 83690 ASSAY OF LIPASE: CPT

## 2024-07-20 PROCEDURE — 94761 N-INVAS EAR/PLS OXIMETRY MLT: CPT

## 2024-07-20 PROCEDURE — 81025 URINE PREGNANCY TEST: CPT

## 2024-07-20 PROCEDURE — 80053 COMPREHEN METABOLIC PANEL: CPT

## 2024-07-20 RX ORDER — KETOROLAC TROMETHAMINE 30 MG/ML
30 INJECTION, SOLUTION INTRAMUSCULAR; INTRAVENOUS
Status: COMPLETED | OUTPATIENT
Start: 2024-07-20 | End: 2024-07-20

## 2024-07-20 RX ORDER — NAPROXEN 500 MG/1
500 TABLET ORAL 2 TIMES DAILY WITH MEALS
Qty: 60 TABLET | Refills: 0 | Status: SHIPPED | OUTPATIENT
Start: 2024-07-20

## 2024-07-20 RX ORDER — HYDROCODONE BITARTRATE AND ACETAMINOPHEN 5; 325 MG/1; MG/1
1 TABLET ORAL EVERY 8 HOURS PRN
Qty: 10 TABLET | Refills: 0 | Status: SHIPPED | OUTPATIENT
Start: 2024-07-20 | End: 2024-07-23

## 2024-07-20 RX ORDER — ONDANSETRON 2 MG/ML
4 INJECTION INTRAMUSCULAR; INTRAVENOUS ONCE
Status: COMPLETED | OUTPATIENT
Start: 2024-07-20 | End: 2024-07-20

## 2024-07-20 RX ADMIN — KETOROLAC TROMETHAMINE 30 MG: 30 INJECTION, SOLUTION INTRAMUSCULAR at 03:57

## 2024-07-20 RX ADMIN — ONDANSETRON 4 MG: 2 INJECTION INTRAMUSCULAR; INTRAVENOUS at 03:57

## 2024-07-20 ASSESSMENT — PAIN SCALES - GENERAL: PAINLEVEL_OUTOF10: 10

## 2024-07-20 ASSESSMENT — PAIN - FUNCTIONAL ASSESSMENT: PAIN_FUNCTIONAL_ASSESSMENT: 0-10

## 2024-07-20 ASSESSMENT — PAIN DESCRIPTION - ORIENTATION: ORIENTATION: LOWER

## 2024-07-20 ASSESSMENT — PAIN DESCRIPTION - LOCATION: LOCATION: ABDOMEN;BACK

## 2024-07-20 ASSESSMENT — PAIN DESCRIPTION - DESCRIPTORS: DESCRIPTORS: ACHING;THROBBING

## 2024-07-20 NOTE — ED TRIAGE NOTES
Patient arrives ambulatory to ED with complaints of lower abdominal and lower abdominal pain     Patient started her period on Thursday, pain has worsened since Thursday     Reported some slight nausea, but has now subsided     Patient has taken 2 flexeril around 12 am for attempted relief     History of fibroids

## 2024-07-20 NOTE — ED PROVIDER NOTES
[facility] EMERGENCY DEPT  EMERGENCY DEPARTMENT ENCOUNTER      Pt Name: Staci Seals  MRN: [id number]  Birthdate 1990  Date of evaluation: 7/20/2024  Provider: Mick Wheeler MD    CHIEF COMPLAINT       Chief Complaint   Patient presents with    Abdominal Pain    Back Pain         HISTORY OF PRESENT ILLNESS   (Location/Symptom, Timing/Onset, Context/Setting, Quality, Duration, Modifying Factors, Severity)  Note limiting factors.   34-year-old female with a past medical history significant for morbid obesity, hypercholesterolemia, hypertension, menometrorrhagia, secondary dysmenorrhea, fibroid, status post gastric bypass, s/p laparoscopy for endometriosis, who presents to ER with complaint of sharp stabbing pelvic pain described as cramping in nature, severity 8 out of 10, accompanied by lightheadedness and dizziness.  The patient stated she is currently on her menstrual discomfort is worse than usual.  She is bleeding approximately 2-3 pads per day.  She denies any fever and chills, headache, nausea or vomiting, abdominal pain, extremity weakness or numbness, sick contact, skin rash or recent travel.            Review of External Medical Records:     Nursing Notes were reviewed.    REVIEW OF SYSTEMS    (2-9 systems for level 4, 10 or more for level 5)     Review of Systems   All other systems reviewed and are negative.      Except as noted above the remainder of the review of systems was reviewed and negative.       PAST MEDICAL HISTORY     Past Medical History:   Diagnosis Date    Adverse effect of anesthesia     pt states it took \"at least 2 hours to wake up\" after last surgery    Hypercholesterolemia     Hypertension 1/28/2011    IUD (intrauterine device) in place 07/23/2015    removed 2015    LSIL (low grade squamous intraepithelial lesion) on Pap smear 12/4/12    Menorrhagia with regular cycle 7/2/2013    Obesity, Class II, BMI 35-39.9 9/23/13    BMI 37    Secondary dysmenorrhea 7/2/2013

## 2024-08-05 ENCOUNTER — OFFICE VISIT (OUTPATIENT)
Age: 34
End: 2024-08-05
Payer: COMMERCIAL

## 2024-08-05 VITALS
BODY MASS INDEX: 37.2 KG/M2 | WEIGHT: 237 LBS | SYSTOLIC BLOOD PRESSURE: 172 MMHG | DIASTOLIC BLOOD PRESSURE: 104 MMHG | HEART RATE: 79 BPM | RESPIRATION RATE: 18 BRPM | OXYGEN SATURATION: 98 % | HEIGHT: 67 IN

## 2024-08-05 DIAGNOSIS — R10.2 PELVIC PAIN: Primary | ICD-10-CM

## 2024-08-05 DIAGNOSIS — N92.6 IRREGULAR BLEEDING: ICD-10-CM

## 2024-08-05 DIAGNOSIS — D21.9 FIBROID: ICD-10-CM

## 2024-08-05 PROCEDURE — 99214 OFFICE O/P EST MOD 30 MIN: CPT | Performed by: OBSTETRICS & GYNECOLOGY

## 2024-08-07 ENCOUNTER — PREP FOR PROCEDURE (OUTPATIENT)
Age: 34
End: 2024-08-07

## 2024-08-07 DIAGNOSIS — D21.9 FIBROID: ICD-10-CM

## 2024-08-20 ENCOUNTER — TELEPHONE (OUTPATIENT)
Age: 34
End: 2024-08-20

## 2024-08-20 NOTE — TELEPHONE ENCOUNTER
Called surgery scheduling and moved patients surgery from 09/18/24 to 11/20/24 at 9 am. Patient is aware PAT will reach out to her prior to surgery. PAT has been notified of change.   Existing authorization thru [employer] is still good from 09/18/24-12/18/24 for CPT code 23327 #[id number].

## (undated) DEVICE — TOWEL SURG W17XL27IN STD BLU COT NONFENESTRATED PREWASHED

## (undated) DEVICE — TOWEL,OR,DSP,ST,BLUE,STD,6/PK,12PK/CS: Brand: MEDLINE

## (undated) DEVICE — MEDI-VAC NON-CONDUCTIVE SUCTION TUBING: Brand: CARDINAL HEALTH

## (undated) DEVICE — ELECTRODE ES L11CM DIA5MM BALL SHFT RED DISP UTAHLOOP

## (undated) DEVICE — LIGHT HANDLE: Brand: DEVON

## (undated) DEVICE — PERI/GYN PACK: Brand: CONVERTORS

## (undated) DEVICE — MINOR VAGINAL PACK: Brand: MEDLINE INDUSTRIES, INC.

## (undated) DEVICE — UNDERGLOVE SURG SZ 6.5 BLU LTX FREE SYN POLYISOPRENE

## (undated) DEVICE — STERILE POLYISOPRENE POWDER-FREE SURGICAL GLOVES: Brand: PROTEXIS

## (undated) DEVICE — COVER LT HNDL BLU PLAS

## (undated) DEVICE — CUSTOM PROCTO SWABS: Brand: DEROYAL

## (undated) DEVICE — SYR 10ML LUER LOK 1/5ML GRAD --

## (undated) DEVICE — TURNOVER KIT OR CUST CMB FLD GEN LF

## (undated) DEVICE — GLOVE SURG SZ 65 L12IN FNGR THK79MIL GRN LTX FREE

## (undated) DEVICE — ROCKER SWITCH PENCIL BLADE ELECTRODE, HOLSTER: Brand: EDGE

## (undated) DEVICE — NEEDLE HYPO 22GA L1.5IN BLK POLYPR HUB S STL REG BVL STR

## (undated) DEVICE — PAD SANIT NPKN 4IN GRD

## (undated) DEVICE — TUBE ST FLD CTRL AQUILEX INFLO --

## (undated) DEVICE — TELFA NON-ADHERENT PADS PREPAK: Brand: TELFA

## (undated) DEVICE — INFECTION CONTROL KIT SYS

## (undated) DEVICE — GARMENT,MEDLINE,DVT,INT,CALF,MED, GEN2: Brand: MEDLINE

## (undated) DEVICE — CATHETER,URETHRAL,REDRUBBER,STRL,14FR: Brand: MEDLINE INDUSTRIES, INC.

## (undated) DEVICE — DRAPE,UNDERBUTTOCKS,PCH,STERILE: Brand: MEDLINE

## (undated) DEVICE — SOUTHSIDE TURNOVER: Brand: MEDLINE INDUSTRIES, INC.

## (undated) DEVICE — CATH URETH INTMIT ROB 16FR FUN -- CONVERT TO ITEM 179520

## (undated) DEVICE — STRAP RESTRAIN W3.5XL19IN TECLIN STRRP POS LEG DURING LITH

## (undated) DEVICE — NDL SPNE QNCKE 18GX3.5IN LF --

## (undated) DEVICE — LLETZ LOOP ELECTRODE, 10MM WIDE X 10MM DEEP, 11.8 CM SHAFT, YELLOW: Brand: MEGADYNE

## (undated) DEVICE — REM POLYHESIVE ADULT PATIENT RETURN ELECTRODE: Brand: VALLEYLAB

## (undated) DEVICE — DEVON™ KNEE AND BODY STRAP 60" X 3" (1.5 M X 7.6 CM): Brand: DEVON

## (undated) DEVICE — KIT PREP VAG WET SKIN SCRB CURITY

## (undated) DEVICE — TUBE ST FLD AQUILEX OUTFLO --

## (undated) DEVICE — BASIC SINGLE BASIN-LF: Brand: MEDLINE INDUSTRIES, INC.

## (undated) DEVICE — LLETZ LOOP ELECTRODE, 15MM WIDE X 12MM DEEP, 11.8 CM SHAFT, GREEN: Brand: MEGADYNE

## (undated) DEVICE — GLOVE SURG SZ 65 THK91MIL LTX FREE SYN POLYISOPRENE

## (undated) DEVICE — SOLUTION IRRIG 3000ML 0.9% SOD CHL USP UROMATIC PLAS CONT

## (undated) DEVICE — SPECIMEN SOCK - STANDARD: Brand: MEDI-VAC

## (undated) DEVICE — DEVICE TISS REM IU CANSTR VAC TB FT PEDAL DISPOSABLE MYOSURE

## (undated) DEVICE — SURGICAL PROCEDURE PACK BASIN MAJ SET CUST NO CAUT

## (undated) DEVICE — SYSTEM WST MGMT W/ CAP AVETA

## (undated) DEVICE — HYSTEROSCOPE RIGID CORAL AVETA DISP

## (undated) DEVICE — GAMMEX® NON-LATEX PI SIZE 6.5, STERILE POLYISOPRENE POWDER-FREE SURGICAL GLOVE: Brand: GAMMEX

## (undated) DEVICE — SOLUTION IRRIG 500ML 0.9% SOD CHL USP POUR PLAS BTL

## (undated) DEVICE — SYSTEM WST MGMT AVETA